# Patient Record
Sex: MALE | Race: WHITE | Employment: FULL TIME | URBAN - METROPOLITAN AREA
[De-identification: names, ages, dates, MRNs, and addresses within clinical notes are randomized per-mention and may not be internally consistent; named-entity substitution may affect disease eponyms.]

---

## 2017-04-21 RX ORDER — DICLOFENAC SODIUM 75 MG/1
75 TABLET, DELAYED RELEASE ORAL 2 TIMES DAILY
Qty: 60 TABLET | Refills: 3 | Status: SHIPPED | OUTPATIENT
Start: 2017-04-21

## 2017-04-26 ENCOUNTER — TELEPHONE (OUTPATIENT)
Dept: PRIMARY CARE CLINIC | Age: 52
End: 2017-04-26

## 2017-08-22 ENCOUNTER — OFFICE VISIT (OUTPATIENT)
Dept: PRIMARY CARE CLINIC | Age: 52
End: 2017-08-22

## 2017-08-22 VITALS
SYSTOLIC BLOOD PRESSURE: 120 MMHG | HEIGHT: 70 IN | DIASTOLIC BLOOD PRESSURE: 82 MMHG | WEIGHT: 315 LBS | HEART RATE: 64 BPM | TEMPERATURE: 97 F | BODY MASS INDEX: 45.1 KG/M2 | RESPIRATION RATE: 16 BRPM

## 2017-08-22 DIAGNOSIS — S81.801D LEG WOUND, RIGHT, SUBSEQUENT ENCOUNTER: ICD-10-CM

## 2017-08-22 DIAGNOSIS — M54.6 THORACOLUMBAR BACK PAIN: ICD-10-CM

## 2017-08-22 DIAGNOSIS — R60.0 LOCALIZED EDEMA: ICD-10-CM

## 2017-08-22 DIAGNOSIS — L03.115 CELLULITIS OF RIGHT LOWER EXTREMITY: Primary | ICD-10-CM

## 2017-08-22 DIAGNOSIS — M54.50 THORACOLUMBAR BACK PAIN: ICD-10-CM

## 2017-08-22 PROCEDURE — G8417 CALC BMI ABV UP PARAM F/U: HCPCS | Performed by: FAMILY MEDICINE

## 2017-08-22 PROCEDURE — 99213 OFFICE O/P EST LOW 20 MIN: CPT | Performed by: FAMILY MEDICINE

## 2017-08-22 PROCEDURE — G8427 DOCREV CUR MEDS BY ELIG CLIN: HCPCS | Performed by: FAMILY MEDICINE

## 2017-08-22 PROCEDURE — 1036F TOBACCO NON-USER: CPT | Performed by: FAMILY MEDICINE

## 2017-08-22 RX ORDER — CEPHALEXIN 500 MG/1
500 CAPSULE ORAL 3 TIMES DAILY
Qty: 30 CAPSULE | Refills: 0 | Status: SHIPPED | OUTPATIENT
Start: 2017-08-22 | End: 2017-09-01

## 2017-08-22 RX ORDER — HYDROCODONE BITARTRATE AND ACETAMINOPHEN 7.5; 325 MG/1; MG/1
1 TABLET ORAL EVERY 6 HOURS PRN
Qty: 60 TABLET | Refills: 0 | Status: SHIPPED | OUTPATIENT
Start: 2017-08-22 | End: 2018-05-24

## 2017-08-22 RX ORDER — FUROSEMIDE 20 MG/1
20 TABLET ORAL DAILY
Qty: 30 TABLET | Refills: 5 | Status: SHIPPED | OUTPATIENT
Start: 2017-08-22

## 2017-08-22 ASSESSMENT — ENCOUNTER SYMPTOMS
WHEEZING: 0
ABDOMINAL PAIN: 0
EYE REDNESS: 0
EYE PAIN: 0
FACIAL SWELLING: 0
APNEA: 0
CHEST TIGHTNESS: 0
DIARRHEA: 0
CONSTIPATION: 0
CHOKING: 0
COLOR CHANGE: 0
NAUSEA: 0
STRIDOR: 0
EYE DISCHARGE: 0
PHOTOPHOBIA: 0

## 2017-08-22 ASSESSMENT — PATIENT HEALTH QUESTIONNAIRE - PHQ9
SUM OF ALL RESPONSES TO PHQ QUESTIONS 1-9: 0
2. FEELING DOWN, DEPRESSED OR HOPELESS: 0
SUM OF ALL RESPONSES TO PHQ9 QUESTIONS 1 & 2: 0
1. LITTLE INTEREST OR PLEASURE IN DOING THINGS: 0

## 2017-10-30 RX ORDER — LIDOCAINE 50 MG/G
1 PATCH TOPICAL DAILY
Qty: 30 PATCH | Refills: 3 | Status: SHIPPED | OUTPATIENT
Start: 2017-10-30 | End: 2018-06-06 | Stop reason: SDUPTHER

## 2018-01-23 ENCOUNTER — OFFICE VISIT (OUTPATIENT)
Dept: PRIMARY CARE CLINIC | Age: 53
End: 2018-01-23
Payer: COMMERCIAL

## 2018-01-23 VITALS
HEIGHT: 69 IN | WEIGHT: 315 LBS | TEMPERATURE: 97.5 F | DIASTOLIC BLOOD PRESSURE: 90 MMHG | BODY MASS INDEX: 46.65 KG/M2 | HEART RATE: 72 BPM | SYSTOLIC BLOOD PRESSURE: 142 MMHG | RESPIRATION RATE: 14 BRPM

## 2018-01-23 DIAGNOSIS — R60.0 LOCALIZED EDEMA: ICD-10-CM

## 2018-01-23 DIAGNOSIS — L02.415 CELLULITIS AND ABSCESS OF RIGHT LEG: ICD-10-CM

## 2018-01-23 DIAGNOSIS — L03.115 CELLULITIS AND ABSCESS OF RIGHT LEG: ICD-10-CM

## 2018-01-23 DIAGNOSIS — S81.801S WOUND OF RIGHT LOWER EXTREMITY, SEQUELA: Primary | ICD-10-CM

## 2018-01-23 PROCEDURE — G8484 FLU IMMUNIZE NO ADMIN: HCPCS | Performed by: FAMILY MEDICINE

## 2018-01-23 PROCEDURE — G8427 DOCREV CUR MEDS BY ELIG CLIN: HCPCS | Performed by: FAMILY MEDICINE

## 2018-01-23 PROCEDURE — G8417 CALC BMI ABV UP PARAM F/U: HCPCS | Performed by: FAMILY MEDICINE

## 2018-01-23 PROCEDURE — 99213 OFFICE O/P EST LOW 20 MIN: CPT | Performed by: FAMILY MEDICINE

## 2018-01-23 PROCEDURE — 1036F TOBACCO NON-USER: CPT | Performed by: FAMILY MEDICINE

## 2018-01-23 PROCEDURE — 3017F COLORECTAL CA SCREEN DOC REV: CPT | Performed by: FAMILY MEDICINE

## 2018-01-23 RX ORDER — TRIAMTERENE AND HYDROCHLOROTHIAZIDE 37.5; 25 MG/1; MG/1
1 CAPSULE ORAL DAILY
Qty: 30 CAPSULE | Refills: 3 | Status: SHIPPED | OUTPATIENT
Start: 2018-01-23

## 2018-01-23 ASSESSMENT — ENCOUNTER SYMPTOMS
CONSTIPATION: 0
ABDOMINAL PAIN: 0
STRIDOR: 0
CHEST TIGHTNESS: 0
APNEA: 0
CHOKING: 0
DIARRHEA: 0
COLOR CHANGE: 0
EYE DISCHARGE: 0
FACIAL SWELLING: 0
PHOTOPHOBIA: 0
EYE REDNESS: 0
NAUSEA: 0
WHEEZING: 0
EYE PAIN: 0

## 2018-01-23 NOTE — PROGRESS NOTES
Subjective:      Patient ID: Martha Hiuzar is a 50 y.o. male who presents today for:  Chief Complaint   Patient presents with    Wound Infection     Pt. is here for a wound on his right lower leg. Pt. c/o of it seeping. Pt. denies any pain. Pt. is using 4X4 that are soaked after a 1/2 hour.  Health Maintenance     Pt. states he had a Colonoscopy done 4 years ago with Dr. Chelsea Hernandez. HPI   Wound Infection  Patient is here today for f/u on recurrent wound on right lower leg. Patient states that is is constantly seeping and states he soaks a 4x4 dressing after 1/2 hour. He denies any pain. He has been on various antibiotics in the past for treatment. He states the issues with cellulitis is aggravated by standing on his legs for periods of time. He is taking Lasix daily that he started again last night. It was mentioned by Dr. Zak Durand for patient to consider weight loss surgery and positives and negatives were discussed.      Past Medical History:   Diagnosis Date    Abnormal EKG 9/7/2016    Abrasion 8/21/2014    Atypical chest pain 2/18/2013    Class 3 obesity with serious comorbidity and body mass index (BMI) of 60.0 to 69.9 in adult [facility]) 1/23/2018    Cold sore 8/3/2015    Corneal abrasion     Edema 8/21/2014    Edema leg     lower leg    ETD (eustachian tube dysfunction) 4/17/2013    Hemorrhoids 1/9/2015    Hyperglycemia 9/7/2016    Knee pain, right     Lateral epicondylitis, R 2/18/2013    LBP (low back pain) 4/22/2014    Left otitis media 4/17/2013    Leg length discrepancy     Leg wound, right 8/21/2014    Osteoarthritis     Other and unspecified hyperlipidemia 8/15/2016    Pes planus     Pleuritis 2/18/2013    Right knee pain 8/15/2012    Rotator cuff tendinitis     Spondylarthrosis 5/7/2012    Thoracolumbar back pain 5/7/2012    Thoracolumbar back pain 5/7/2012    Tobacco abuse 2/18/2013    Tobacco abuse     Unspecified sleep apnea     Unstable knee 7/8/2015   

## 2018-01-23 NOTE — LETTER
[place] [address]  Phone: [phone]  Fax: [id number] 24, DO        January 23, 2018     Patient: José Peeling   YOB: 1965   Date of Visit: 1/23/2018       To Whom it May Concern:    Leena Bryan was seen in my clinic on 1/23/2018. He is excused from work 01/23/2018-.01/25/2018 and may return 01/26/2018 with no restrictions. If you have any questions or concerns, please don't hesitate to call.     Sincerely,     [facility], DO

## 2018-01-26 LAB
GRAM STAIN RESULT: NORMAL
WOUND/ABSCESS: NORMAL

## 2018-02-02 ENCOUNTER — OFFICE VISIT (OUTPATIENT)
Dept: PRIMARY CARE CLINIC | Age: 53
End: 2018-02-02
Payer: COMMERCIAL

## 2018-02-02 VITALS
BODY MASS INDEX: 46.65 KG/M2 | HEIGHT: 69 IN | TEMPERATURE: 97.4 F | HEART RATE: 80 BPM | RESPIRATION RATE: 14 BRPM | DIASTOLIC BLOOD PRESSURE: 90 MMHG | SYSTOLIC BLOOD PRESSURE: 146 MMHG | WEIGHT: 315 LBS

## 2018-02-02 DIAGNOSIS — E53.8 VITAMIN B 12 DEFICIENCY: ICD-10-CM

## 2018-02-02 DIAGNOSIS — G56.03 BILATERAL CARPAL TUNNEL SYNDROME: ICD-10-CM

## 2018-02-02 DIAGNOSIS — R20.2 TINGLING IN EXTREMITIES: ICD-10-CM

## 2018-02-02 DIAGNOSIS — L03.115 CELLULITIS OF RIGHT LOWER EXTREMITY: Primary | ICD-10-CM

## 2018-02-02 DIAGNOSIS — R03.0 ELEVATED BP WITHOUT DIAGNOSIS OF HYPERTENSION: ICD-10-CM

## 2018-02-02 PROCEDURE — 99213 OFFICE O/P EST LOW 20 MIN: CPT | Performed by: FAMILY MEDICINE

## 2018-02-02 PROCEDURE — G8427 DOCREV CUR MEDS BY ELIG CLIN: HCPCS | Performed by: FAMILY MEDICINE

## 2018-02-02 PROCEDURE — 96372 THER/PROPH/DIAG INJ SC/IM: CPT | Performed by: FAMILY MEDICINE

## 2018-02-02 PROCEDURE — 3017F COLORECTAL CA SCREEN DOC REV: CPT | Performed by: FAMILY MEDICINE

## 2018-02-02 PROCEDURE — 1036F TOBACCO NON-USER: CPT | Performed by: FAMILY MEDICINE

## 2018-02-02 PROCEDURE — G8484 FLU IMMUNIZE NO ADMIN: HCPCS | Performed by: FAMILY MEDICINE

## 2018-02-02 PROCEDURE — G8417 CALC BMI ABV UP PARAM F/U: HCPCS | Performed by: FAMILY MEDICINE

## 2018-02-02 RX ORDER — CYANOCOBALAMIN 1000 UG/ML
1000 INJECTION INTRAMUSCULAR; SUBCUTANEOUS ONCE
Status: COMPLETED | OUTPATIENT
Start: 2018-02-02 | End: 2018-02-02

## 2018-02-02 RX ADMIN — CYANOCOBALAMIN 1000 MCG: 1000 INJECTION INTRAMUSCULAR; SUBCUTANEOUS at 11:34

## 2018-02-02 ASSESSMENT — ENCOUNTER SYMPTOMS
COLOR CHANGE: 1
EYE DISCHARGE: 0
CHOKING: 0
PHOTOPHOBIA: 0
ABDOMINAL PAIN: 0
EYE REDNESS: 0
STRIDOR: 0
WHEEZING: 0
NAUSEA: 0
FACIAL SWELLING: 0
APNEA: 0
DIARRHEA: 0
EYE PAIN: 0
CONSTIPATION: 0
CHEST TIGHTNESS: 0

## 2018-02-02 NOTE — PROGRESS NOTES
Skin: Positive for color change (right leg). Negative for pallor, rash and wound. Allergic/Immunologic: Negative for immunocompromised state. Neurological: Positive for tingling. Negative for tremors, syncope, facial asymmetry, speech difficulty, numbness and headaches. Psychiatric/Behavioral: Negative for confusion and hallucinations. The patient is not nervous/anxious and is not hyperactive. Objective:   BP (!) 146/90 (Site: Right Arm, Position: Sitting, Cuff Size: Large Adult)   Pulse 80   Temp 97.4 °F (36.3 °C) (Oral)   Resp 14   Ht 5' 9\" (1.753 m)   Wt (!) 456 lb (206.8 kg)   BMI 67.34 kg/m²     Physical Exam   Constitutional: He is oriented to person, place, and time. He appears well-developed and well-nourished. HENT:   Head: Normocephalic and atraumatic. Mouth/Throat: Oropharynx is clear and moist. No oropharyngeal exudate. Eyes: Conjunctivae and EOM are normal. Pupils are equal, round, and reactive to light. Right eye exhibits no discharge. Left eye exhibits no discharge. No scleral icterus. Neck: Normal range of motion. Neck supple. No thyromegaly present. Cardiovascular: Normal rate, regular rhythm, normal heart sounds and intact distal pulses. Exam reveals no gallop and no friction rub. No murmur heard. Pulmonary/Chest: Effort normal and breath sounds normal. No respiratory distress. He has no wheezes. He has no rales. He exhibits no tenderness. Lymphadenopathy:     He has no cervical adenopathy. Neurological: He is alert and oriented to person, place, and time. Skin: Skin is warm and dry. There is erythema. Psychiatric: He has a normal mood and affect. His behavior is normal. Judgment and thought content normal.   Nursing note and vitals reviewed. Assessment:      Diagnosis Orders   1. Cellulitis of right lower extremity, improved     2. Elevated BP without diagnosis of hypertension     3. Bilateral carpal tunnel syndrome  EMG   4.  Tingling in

## 2018-04-30 DIAGNOSIS — S81.801S WOUND OF RIGHT LOWER EXTREMITY, SEQUELA: Primary | ICD-10-CM

## 2018-05-24 ENCOUNTER — HOSPITAL ENCOUNTER (OUTPATIENT)
Dept: WOUND CARE | Age: 53
Discharge: HOME | End: 2018-05-24
Payer: COMMERCIAL

## 2018-05-24 DIAGNOSIS — L97.812 NON-PRESSURE CHRONIC ULCER OF OTHER PART OF RIGHT LOWER LEG WITH FAT LAYER EXPOSED (HCC): ICD-10-CM

## 2018-05-24 DIAGNOSIS — I87.2 VENOUS INSUFFICIENCY (CHRONIC) (PERIPHERAL): ICD-10-CM

## 2018-05-24 PROCEDURE — 99213 OFFICE O/P EST LOW 20 MIN: CPT

## 2018-05-24 RX ORDER — GENTAMICIN SULFATE 1 MG/G
OINTMENT TOPICAL
Qty: 15 G | Refills: 3 | Status: SHIPPED | OUTPATIENT
Start: 2018-05-24 | End: 2019-03-29 | Stop reason: ALTCHOICE

## 2018-06-06 ENCOUNTER — OFFICE VISIT (OUTPATIENT)
Dept: PRIMARY CARE CLINIC | Age: 53
End: 2018-06-06
Payer: COMMERCIAL

## 2018-06-06 VITALS
RESPIRATION RATE: 16 BRPM | OXYGEN SATURATION: 96 % | WEIGHT: 315 LBS | BODY MASS INDEX: 46.65 KG/M2 | SYSTOLIC BLOOD PRESSURE: 138 MMHG | HEART RATE: 76 BPM | DIASTOLIC BLOOD PRESSURE: 76 MMHG | HEIGHT: 69 IN | TEMPERATURE: 98.1 F

## 2018-06-06 DIAGNOSIS — L97.812 NON-PRESSURE CHRONIC ULCER OF OTHER PART OF RIGHT LOWER LEG WITH FAT LAYER EXPOSED (HCC): ICD-10-CM

## 2018-06-06 DIAGNOSIS — M47.816 SPONDYLOSIS OF LUMBAR REGION WITHOUT MYELOPATHY OR RADICULOPATHY: ICD-10-CM

## 2018-06-06 DIAGNOSIS — M15.9 PRIMARY OSTEOARTHRITIS INVOLVING MULTIPLE JOINTS: ICD-10-CM

## 2018-06-06 DIAGNOSIS — I87.2 VENOUS INSUFFICIENCY (CHRONIC) (PERIPHERAL): ICD-10-CM

## 2018-06-06 DIAGNOSIS — S81.801D WOUND OF RIGHT LOWER EXTREMITY, SUBSEQUENT ENCOUNTER: Primary | ICD-10-CM

## 2018-06-06 DIAGNOSIS — M54.50 THORACOLUMBAR BACK PAIN: ICD-10-CM

## 2018-06-06 DIAGNOSIS — M54.6 THORACOLUMBAR BACK PAIN: ICD-10-CM

## 2018-06-06 PROCEDURE — G8417 CALC BMI ABV UP PARAM F/U: HCPCS | Performed by: FAMILY MEDICINE

## 2018-06-06 PROCEDURE — 3017F COLORECTAL CA SCREEN DOC REV: CPT | Performed by: FAMILY MEDICINE

## 2018-06-06 PROCEDURE — 99213 OFFICE O/P EST LOW 20 MIN: CPT | Performed by: FAMILY MEDICINE

## 2018-06-06 PROCEDURE — 1036F TOBACCO NON-USER: CPT | Performed by: FAMILY MEDICINE

## 2018-06-06 PROCEDURE — G8427 DOCREV CUR MEDS BY ELIG CLIN: HCPCS | Performed by: FAMILY MEDICINE

## 2018-06-06 RX ORDER — HYDROCODONE BITARTRATE AND ACETAMINOPHEN 7.5; 325 MG/1; MG/1
1 TABLET ORAL EVERY 6 HOURS PRN
Qty: 60 TABLET | Refills: 0 | Status: SHIPPED | OUTPATIENT
Start: 2018-06-06 | End: 2018-10-09 | Stop reason: SDUPTHER

## 2018-06-06 RX ORDER — CEPHALEXIN 500 MG/1
500 CAPSULE ORAL 3 TIMES DAILY
Qty: 30 CAPSULE | Refills: 0 | Status: SHIPPED | OUTPATIENT
Start: 2018-06-06 | End: 2018-06-16

## 2018-06-06 RX ORDER — LIDOCAINE 50 MG/G
1 PATCH TOPICAL DAILY
Qty: 30 PATCH | Refills: 3 | Status: SHIPPED | OUTPATIENT
Start: 2018-06-06

## 2018-06-06 ASSESSMENT — ENCOUNTER SYMPTOMS
BACK PAIN: 1
EYE REDNESS: 0
DIARRHEA: 0
PHOTOPHOBIA: 0
APNEA: 0
BOWEL INCONTINENCE: 0
COLOR CHANGE: 0
NAUSEA: 0
CHEST TIGHTNESS: 0
CHOKING: 0
STRIDOR: 0
FACIAL SWELLING: 0
CONSTIPATION: 0
WHEEZING: 0
EYE PAIN: 0
ABDOMINAL PAIN: 0
EYE DISCHARGE: 0

## 2018-06-07 ENCOUNTER — HOSPITAL ENCOUNTER (OUTPATIENT)
Dept: WOUND CARE | Age: 53
Discharge: HOME | End: 2018-06-07
Payer: COMMERCIAL

## 2018-06-07 VITALS
HEIGHT: 69 IN | SYSTOLIC BLOOD PRESSURE: 122 MMHG | RESPIRATION RATE: 18 BRPM | DIASTOLIC BLOOD PRESSURE: 65 MMHG | TEMPERATURE: 98.6 F | BODY MASS INDEX: 46.65 KG/M2 | WEIGHT: 315 LBS | HEART RATE: 70 BPM

## 2018-06-07 PROCEDURE — 11042 DBRDMT SUBQ TIS 1ST 20SQCM/<: CPT

## 2018-06-21 ENCOUNTER — HOSPITAL ENCOUNTER (OUTPATIENT)
Dept: WOUND CARE | Age: 53
Discharge: HOME | End: 2018-06-21
Payer: COMMERCIAL

## 2018-06-21 VITALS
SYSTOLIC BLOOD PRESSURE: 133 MMHG | DIASTOLIC BLOOD PRESSURE: 68 MMHG | HEART RATE: 76 BPM | TEMPERATURE: 97.8 F | RESPIRATION RATE: 20 BRPM

## 2018-06-21 PROCEDURE — 11042 DBRDMT SUBQ TIS 1ST 20SQCM/<: CPT

## 2018-08-02 ENCOUNTER — HOSPITAL ENCOUNTER (OUTPATIENT)
Dept: WOUND CARE | Age: 53
Discharge: HOME | End: 2018-08-02
Payer: COMMERCIAL

## 2018-08-02 VITALS
HEIGHT: 69 IN | HEART RATE: 72 BPM | DIASTOLIC BLOOD PRESSURE: 76 MMHG | TEMPERATURE: 97.6 F | WEIGHT: 315 LBS | SYSTOLIC BLOOD PRESSURE: 160 MMHG | BODY MASS INDEX: 46.65 KG/M2 | RESPIRATION RATE: 20 BRPM

## 2018-08-02 PROCEDURE — 99213 OFFICE O/P EST LOW 20 MIN: CPT

## 2018-08-02 NOTE — PROGRESS NOTES
[address]   Progress Note and Procedure Note      [address] RECORD NUMBER:  [id number]  AGE: 50 y.o. GENDER: male  : 1965  EPISODE DATE:  2018    Subjective:     Chief Complaint   Patient presents with    Wound Check     right leg         HISTORY of PRESENT ILLNESS HPI     Ivania Sepulveda is a 50 y.o. male who presents today for wound/ulcer evaluation. History of Wound Context: Chronic full-thickness venous ulceration right lower leg appears slightly improved with increased granulation. Patient states he did not get the Santyl prescription due to cost and has just been using OTC antibiotic ointment. Denies fever, chills, nausea or vomiting.   Wound/Ulcer Pain Timing/Severity: intermittent, mild  Quality of pain: burning  Severity:  1 / 10   Modifying Factors: Pain worsens with walking  Associated Signs/Symptoms: edema    Ulcer Identification:  Ulcer Type: venous  Contributing Factors: venous stasis, obesity and smoking    Wound: N/A        PAST MEDICAL HISTORY        Diagnosis Date    Abnormal EKG 2016    Abrasion 2014    Atypical chest pain 2013    Class 3 obesity with serious comorbidity and body mass index (BMI) of 60.0 to 69.9 in adult [facility]) 2018    Cold sore 8/3/2015    Corneal abrasion     Edema 2014    Edema leg     lower leg    ETD (eustachian tube dysfunction) 2013    Hemorrhoids 2015    Hyperglycemia 2016    Knee pain, right     Lateral epicondylitis, R 2013    LBP (low back pain) 2014    Left otitis media 2013    Leg length discrepancy     Leg wound, right 2014    Osteoarthritis     Other and unspecified hyperlipidemia 8/15/2016    Pes planus     Pleuritis 2013    Right knee pain 8/15/2012    Rotator cuff tendinitis     Spondylarthrosis 2012    Thoracolumbar back pain 2012    Thoracolumbar back pain 2012    Tingling in extremities, LUE 2018    H66.92    Low back pain M54.5    Leg wound, right S81.801A    Abrasion T14. 8XXA    Leg wound, right S81.801A    Edema R60.9    Weight loss R63.4    Hemorrhoids K64.9    Unstable knee M25.369    Cold sore B00.1    Anal fissure K60.2    Other and unspecified hyperlipidemia E78.5    Abnormal EKG R94.31    Hyperglycemia R73.9    Synovitis and tenosynovitis, unspecified M65.9    Class 3 obesity with serious comorbidity and body mass index (BMI) of 60.0 to 69.9 in adult (HCC) E66.9, Z68.44    Tingling in extremities, LUE R20.2    Non-pressure chronic ulcer of other part of right lower leg with fat layer exposed ([facility] [address] L97.812    Venous insufficiency (chronic) (peripheral) I87.2        Procedure Note  Indications:  Based on my examination of this patient's wound(s)/ulcer(s) today, debridement is not required to promote healing and evaluate the wound base. Plan:     Treatment Note please see attached Discharge Instructions  Will try Ghazala dressing changes, evaluate in 2 weeks, and consider Apligraf application. In my professional opinion this patient would benefit from HBO Therapy: No    Written patient dismissal instructions given to patient and signed by patient or POA. Discharge Instructions         Discharge Instructions        Home Care:  none     Wound Location:  Right Lower Lateral Leg     Dressing orders: 1. Cleanse wound(s) with normal saline. 2. Apply dry GHAZALA  Or equivalent to wound bed. 3. Moisten GHAZALA with a few drops of normal saline. 4. Cover with 4x4's and wrap with gauze (amy or kerlix)  5. Change  Every other day or Monday, Wednesday, and Friday    Compression:  Apply medium 10-20mmHg compression hose to right lower legs, may remove at bedtime, reapply first thing in the morning, avoid prolonged standing, elevate legs when sitting     Other Instructions:      Keep all dressings clean & dry.  Do not shower, take baths or get wound wet, unless otherwise instructed by

## 2018-08-16 ENCOUNTER — HOSPITAL ENCOUNTER (OUTPATIENT)
Dept: WOUND CARE | Age: 53
Discharge: HOME | End: 2018-08-16
Payer: COMMERCIAL

## 2018-08-16 VITALS
HEIGHT: 69 IN | SYSTOLIC BLOOD PRESSURE: 144 MMHG | DIASTOLIC BLOOD PRESSURE: 72 MMHG | HEART RATE: 86 BPM | BODY MASS INDEX: 46.65 KG/M2 | RESPIRATION RATE: 20 BRPM | TEMPERATURE: 97.7 F | WEIGHT: 315 LBS

## 2018-08-16 PROCEDURE — 11042 DBRDMT SUBQ TIS 1ST 20SQCM/<: CPT

## 2018-08-16 NOTE — PROGRESS NOTES
kg)   BMI 70.29 kg/m²     Wt Readings from Last 3 Encounters:   08/16/18 (!) 476 lb (215.9 kg)   08/02/18 (!) 476 lb (215.9 kg)   06/07/18 (!) 476 lb (215.9 kg)       PHYSICAL EXAM    Constitutional:   Well nourished and well developed. Appears neat and clean. Patient is alert, oriented x3, and in no apparent distress. Respiratory:  Respiratory effort is easy and symmetric bilaterally. Rate is normal at rest and on room air. Vascular:  Pedal Pulses is palpable and audible with doppler. Capillary refill is <3 sec to digits bilateral.  Extremities negative for pitting edema. Neurological:  Cranial nerves grossly intact. Deep tendon reflexes of the lower extremities are intact and symmetrical bilaterally. Sensation normal to touch and vibration. Sensation intact to 10 gram monofilament in extremities. Musculoskeletal:  Gait and station stable. Muscle strength +5/5 to all extrinsic muscles to the foot bilateral.  Full range of motion of ankle, subtalar, and midtarsal joints noted without crepitation. No cyanosis, clubbing or edema noted. Dermatological:  Wound description noted in wound assessment. Otherwise, skin is warm, dry, and well-hydrated with normal turgor, texture, and pigmentation. Psychiatric:  Judgement and insight intact. Short and long term memory intact. No evidence of depression, anxiety, or agitation. Patient is calm, cooperative, and communicative. Appropriate interactions and affect.         Assessment:      Patient Active Problem List   Diagnosis Code    Osteoarthritis M19.90    Sleep apnea G47.30    Finger infection L08.9    Spondylarthrosis M47.9    Thoracolumbar back pain M54.5, M54.6    Right knee pain M25.561    Lateral epicondylitis, R M77.10    Pleuritis R09.1    Atypical chest pain R07.89    Tobacco abuse Z72.0    ETD (eustachian tube dysfunction) H69.80    Left otitis media H66.92    Low back pain M54.5    Leg wound, right S81.801A    Abrasion Serosanguinous 8/16/2018  1:17 PM   Odor None 8/16/2018  1:17 PM   Margins Defined edges 8/16/2018  1:17 PM   Yue-wound Assessment Dry; Intact 8/16/2018  1:17 PM   Non-staged Wound Description Full thickness 8/16/2018  1:17 PM   [place]%Wound Bed 50 8/2/2018  1:00 PM   Red%Wound Bed 60 8/16/2018  1:17 PM   Yellow%Wound Bed 40 8/16/2018  1:17 PM   Op First Treatment Date 05/24/18 5/24/2018  1:04 PM   Number of days: 84       Percent of Wound/Ulcer Debrided: 100%    Total Surface Area Debrided:  4.8 sq cm     Diabetic/Pressure/Non Pressure Ulcers:  Ulcer: Non-Pressure ulcer, fat layer exposed      Bleeding:  Minimal    Hemostasis Achieved:  by pressure    Procedural Pain:  1  / 10     Post Procedural Pain:  0 / 10     Response to treatment:  Well tolerated by patient. Plan:     Treatment Note please see attached Discharge Instructions  Will continue with Ashlee and compression for now. Will consider Apligraf next visit. In my professional opinion this patient would benefit from HBO Therapy: No    Written patient dismissal instructions given to patient and signed by patient or POA. Discharge Instructions         Discharge Instructions        Home Care:  none     Wound Location:  Right Lower Lateral Leg     Dressing orders: 1. Cleanse wound(s) with normal saline. 2. Apply dry ASHLEE  Or equivalent to wound bed. 3. Moisten ASHLEE with a few drops of normal saline. 4. Cover with 4x4's and wrap with gauze (amy or kerlix)  5. Change  Every other day or Monday, Wednesday, and Friday     Compression:  Apply medium 10-20mmHg compression hose to right lower legs, may remove at bedtime, reapply first thing in the morning, avoid prolonged standing, elevate legs when sitting     Other Instructions:      Keep all dressings clean & dry.  Do not shower, take baths or get wound wet, unless otherwise instructed by your Wound Care doctor     Follow up visit   2 Weeks  August 30, 2018 at     For Diabetic patients keep blood sugars below 150 for optimal wound healing.     If you experience any of the following, please call the Wound Care Service during business hours: [phone]              *Increase in pain   *Temperature over 101   *Increase in drainage from your wound or a foul odor   *Uncontrolled swelling    *Need for compression bandage changes due to slippage, breakthrough drainage     If you need medical attention outside of business hours, please contact your Primary Care Doctor or go to the nearest emergency room. Keep next scheduled appointment. Christy Hanson give 24 hour notice if unable to keep appointment. [phone]     PLEASE NOTE: IF YOU ARE UNABLE TO OBTAIN WOUND SUPPLIES, CONTINUE TO USE THE SUPPLIES YOU HAVE AVAILABLE UNTIL YOU ARE ABLE TO REACH US.  IT IS MOST IMPORTANT TO KEEP THE WOUND COVERED AT ALL TIMES  Electronically signed by Zoya Jade DPM on 8/16/2018 at 1:42 PM          Electronically signed by Zoya Jade DPM on 8/16/2018 at 1:43 PM

## 2018-08-27 ENCOUNTER — OFFICE VISIT (OUTPATIENT)
Dept: PRIMARY CARE CLINIC | Age: 53
End: 2018-08-27
Payer: COMMERCIAL

## 2018-08-27 VITALS
OXYGEN SATURATION: 97 % | DIASTOLIC BLOOD PRESSURE: 70 MMHG | SYSTOLIC BLOOD PRESSURE: 126 MMHG | BODY MASS INDEX: 46.65 KG/M2 | TEMPERATURE: 97.8 F | WEIGHT: 315 LBS | HEART RATE: 81 BPM | RESPIRATION RATE: 12 BRPM | HEIGHT: 69 IN

## 2018-08-27 DIAGNOSIS — J02.9 PHARYNGITIS, UNSPECIFIED ETIOLOGY: Primary | ICD-10-CM

## 2018-08-27 DIAGNOSIS — R68.89 SENSATION OF SWOLLEN THROAT: ICD-10-CM

## 2018-08-27 PROCEDURE — 1036F TOBACCO NON-USER: CPT | Performed by: FAMILY MEDICINE

## 2018-08-27 PROCEDURE — 99213 OFFICE O/P EST LOW 20 MIN: CPT | Performed by: FAMILY MEDICINE

## 2018-08-27 PROCEDURE — G8417 CALC BMI ABV UP PARAM F/U: HCPCS | Performed by: FAMILY MEDICINE

## 2018-08-27 PROCEDURE — 3017F COLORECTAL CA SCREEN DOC REV: CPT | Performed by: FAMILY MEDICINE

## 2018-08-27 PROCEDURE — G8427 DOCREV CUR MEDS BY ELIG CLIN: HCPCS | Performed by: FAMILY MEDICINE

## 2018-08-27 RX ORDER — CEFUROXIME AXETIL 500 MG/1
500 TABLET ORAL 2 TIMES DAILY
Qty: 20 TABLET | Refills: 0 | Status: SHIPPED | OUTPATIENT
Start: 2018-08-27 | End: 2018-09-06

## 2018-08-27 RX ORDER — BENZONATATE 100 MG/1
200 CAPSULE ORAL 3 TIMES DAILY PRN
Qty: 60 CAPSULE | Refills: 1 | COMMUNITY
Start: 2018-08-27 | End: 2018-09-04 | Stop reason: SDUPTHER

## 2018-08-27 ASSESSMENT — ENCOUNTER SYMPTOMS
COLOR CHANGE: 0
CHANGE IN BOWEL HABIT: 0
SORE THROAT: 1
CHEST TIGHTNESS: 0
SWOLLEN GLANDS: 0
COUGH: 1
ABDOMINAL PAIN: 0
EYE PAIN: 0
WHEEZING: 0
EYE REDNESS: 0
APNEA: 0
VOMITING: 0
CONSTIPATION: 0
EYE DISCHARGE: 0
VISUAL CHANGE: 0
DIARRHEA: 0
NAUSEA: 0
FACIAL SWELLING: 0
PHOTOPHOBIA: 0
CHOKING: 0
STRIDOR: 0
RHINORRHEA: 1

## 2018-08-27 ASSESSMENT — PATIENT HEALTH QUESTIONNAIRE - PHQ9
1. LITTLE INTEREST OR PLEASURE IN DOING THINGS: 1
SUM OF ALL RESPONSES TO PHQ9 QUESTIONS 1 & 2: 2
2. FEELING DOWN, DEPRESSED OR HOPELESS: 1
SUM OF ALL RESPONSES TO PHQ QUESTIONS 1-9: 2
SUM OF ALL RESPONSES TO PHQ QUESTIONS 1-9: 2

## 2018-08-27 NOTE — PROGRESS NOTES
Abrasion 8/21/2014    Atypical chest pain 2/18/2013    Class 3 obesity with serious comorbidity and body mass index (BMI) of 60.0 to 69.9 in adult [facility]) 1/23/2018    Cold sore 8/3/2015    Corneal abrasion     Edema 8/21/2014    Edema leg     lower leg    ETD (eustachian tube dysfunction) 4/17/2013    Hemorrhoids 1/9/2015    Hyperglycemia 9/7/2016    Knee pain, right     Lateral epicondylitis, R 2/18/2013    LBP (low back pain) 4/22/2014    Left otitis media 4/17/2013    Leg length discrepancy     Leg wound, right 8/21/2014    Osteoarthritis     Other and unspecified hyperlipidemia 8/15/2016    Pes planus     Pleuritis 2/18/2013    Right knee pain 8/15/2012    Rotator cuff tendinitis     Spondylarthrosis 5/7/2012    Thoracolumbar back pain 5/7/2012    Thoracolumbar back pain 5/7/2012    Tingling in extremities, LUE 2/2/2018    Tobacco abuse 2/18/2013    Tobacco abuse     Unspecified sleep apnea     Unstable knee 7/8/2015    Varicosities     Weight loss 12/11/2014     Past Surgical History:   Procedure Laterality Date    COLONOSCOPY  9/25/14    POLYPECTOMY JARMOSZUK    FASCIOTOMY Right 2006    triple fasciotomy right leg    TONSILLECTOMY       Family History   Problem Relation Age of Onset    Diabetes Mother     Kidney Disease Mother     Cancer Mother         Lung    Cancer Father         Pancreatic    High Blood Pressure Father     Diabetes Other     Cancer Other      Social History     Social History    Marital status:      Spouse name: N/A    Number of children: N/A    Years of education: N/A     Occupational History    Not on file.      Social History Main Topics    Smoking status: Former Smoker     Quit date: 2/1/2014    Smokeless tobacco: Never Used    Alcohol use Yes    Drug use: Unknown    Sexual activity: Not on file     Other Topics Concern    Not on file     Social History Narrative    No narrative on file     Allergies:  Patient has no known allergies. Review of Systems   Constitutional: Positive for chills. Negative for activity change, appetite change, diaphoresis, fatigue and fever. HENT: Positive for congestion (nasal), rhinorrhea and sore throat. Negative for ear discharge, ear pain, facial swelling, hearing loss and mouth sores. Eyes: Negative for photophobia, pain, discharge and redness. Respiratory: Positive for cough (productive of yellow sputum). Negative for apnea, choking, chest tightness, wheezing and stridor. Cardiovascular: Negative for chest pain, palpitations and leg swelling. Gastrointestinal: Negative for abdominal pain, anorexia, change in bowel habit, constipation, diarrhea, nausea and vomiting. Endocrine: Negative for cold intolerance, heat intolerance, polydipsia and polyphagia. Genitourinary: Negative for dysuria and frequency. Musculoskeletal: Negative for arthralgias, gait problem, joint swelling, myalgias, neck pain and neck stiffness. Skin: Negative for color change, pallor, rash and wound. Allergic/Immunologic: Negative for immunocompromised state. Neurological: Negative for vertigo, tremors, syncope, facial asymmetry, speech difficulty, weakness, numbness and headaches. Psychiatric/Behavioral: Negative for confusion and hallucinations. The patient is not nervous/anxious and is not hyperactive. Objective:   /70 (Site: Left Arm, Position: Sitting, Cuff Size: Large Adult)   Pulse 81   Temp 97.8 °F (36.6 °C) (Oral)   Resp 12   Ht 5' 9\" (1.753 m)   Wt (!) 481 lb (218.2 kg)   SpO2 97%   BMI 71.03 kg/m²     Physical Exam   Constitutional: He is oriented to person, place, and time. He appears well-developed and well-nourished. No distress. HENT:   Head: Normocephalic and atraumatic. Right Ear: External ear normal.   Left Ear: External ear normal.   Nose: Nose normal.   Mouth/Throat: Posterior oropharyngeal erythema present. Eyes: Pupils are equal, round, and reactive to light.

## 2018-09-05 RX ORDER — BENZONATATE 100 MG/1
200 CAPSULE ORAL 3 TIMES DAILY PRN
Qty: 60 CAPSULE | Refills: 1 | Status: SHIPPED | OUTPATIENT
Start: 2018-09-05 | End: 2018-12-27 | Stop reason: ALTCHOICE

## 2018-09-10 ENCOUNTER — OFFICE VISIT (OUTPATIENT)
Dept: PRIMARY CARE CLINIC | Age: 53
End: 2018-09-10
Payer: COMMERCIAL

## 2018-09-10 VITALS
BODY MASS INDEX: 46.65 KG/M2 | HEART RATE: 83 BPM | RESPIRATION RATE: 20 BRPM | DIASTOLIC BLOOD PRESSURE: 80 MMHG | WEIGHT: 315 LBS | HEIGHT: 69 IN | OXYGEN SATURATION: 93 % | SYSTOLIC BLOOD PRESSURE: 138 MMHG | TEMPERATURE: 97.8 F

## 2018-09-10 DIAGNOSIS — J45.21 MILD INTERMITTENT REACTIVE AIRWAY DISEASE WITH ACUTE EXACERBATION: ICD-10-CM

## 2018-09-10 DIAGNOSIS — R05.9 COUGH: ICD-10-CM

## 2018-09-10 DIAGNOSIS — J40 BRONCHITIS: Primary | ICD-10-CM

## 2018-09-10 PROCEDURE — G8417 CALC BMI ABV UP PARAM F/U: HCPCS | Performed by: FAMILY MEDICINE

## 2018-09-10 PROCEDURE — G8427 DOCREV CUR MEDS BY ELIG CLIN: HCPCS | Performed by: FAMILY MEDICINE

## 2018-09-10 PROCEDURE — 3017F COLORECTAL CA SCREEN DOC REV: CPT | Performed by: FAMILY MEDICINE

## 2018-09-10 PROCEDURE — 99213 OFFICE O/P EST LOW 20 MIN: CPT | Performed by: FAMILY MEDICINE

## 2018-09-10 PROCEDURE — 1036F TOBACCO NON-USER: CPT | Performed by: FAMILY MEDICINE

## 2018-09-10 RX ORDER — PSEUDOEPHEDRINE HCL, GUAIFENESIN 375; 60 MG/1; MG/1
1 TABLET ORAL
Qty: 30 TABLET | Refills: 1 | Status: SHIPPED | OUTPATIENT
Start: 2018-09-10 | End: 2018-09-17

## 2018-09-10 RX ORDER — ALBUTEROL SULFATE 90 UG/1
2 AEROSOL, METERED RESPIRATORY (INHALATION) EVERY 6 HOURS PRN
Qty: 1 INHALER | Refills: 3 | Status: SHIPPED | OUTPATIENT
Start: 2018-09-10

## 2018-09-10 RX ORDER — AZITHROMYCIN 250 MG/1
TABLET, FILM COATED ORAL
Qty: 1 PACKET | Refills: 0 | Status: SHIPPED | OUTPATIENT
Start: 2018-09-10 | End: 2018-09-17

## 2018-09-10 ASSESSMENT — ENCOUNTER SYMPTOMS
HOARSE VOICE: 0
RHINORRHEA: 1
STRIDOR: 0
ABDOMINAL PAIN: 0
DIARRHEA: 0
PHOTOPHOBIA: 0
CHOKING: 0
EYE DISCHARGE: 0
SINUS PRESSURE: 0
NAUSEA: 0
EYE REDNESS: 0
COLOR CHANGE: 0
SWOLLEN GLANDS: 0
EYE PAIN: 0
SORE THROAT: 0
SHORTNESS OF BREATH: 1
CONSTIPATION: 0
APNEA: 0
CHEST TIGHTNESS: 0
WHEEZING: 1
FACIAL SWELLING: 0
COUGH: 1

## 2018-09-10 NOTE — PROGRESS NOTES
Subjective:      Patient ID: Nayla Eid is a 48 y.o. male who presents today for:  Chief Complaint   Patient presents with    Nasal Congestion     Patient is here for nasal congestion, dry hacking cough, SOB, wheezing, and sweats/warm. He has used wife's rescue inhaler (albuterol) did help breathing. x 2 days. he denies fever,or ear pain. 2-3 hours of sleep occurring from breathing and coughing       Sinusitis   This is a new problem. The current episode started in the past 7 days. The problem is unchanged. There has been no fever. He is experiencing no pain. Associated symptoms include congestion (chest, occasional small amounts of brownish sputum), coughing, diaphoresis and shortness of breath. Pertinent negatives include no chills, ear pain, headaches, hoarse voice, neck pain, sinus pressure, sneezing, sore throat or swollen glands. Treatments tried: Albuterol inhaler, Mucinex, warm tea, leftover antibiotic. The treatment provided no relief.        Past Medical History:   Diagnosis Date    Abnormal EKG 9/7/2016    Abrasion 8/21/2014    Atypical chest pain 2/18/2013    Class 3 obesity with serious comorbidity and body mass index (BMI) of 60.0 to 69.9 in adult [facility]) 1/23/2018    Cold sore 8/3/2015    Corneal abrasion     Edema 8/21/2014    Edema leg     lower leg    ETD (eustachian tube dysfunction) 4/17/2013    Hemorrhoids 1/9/2015    Hyperglycemia 9/7/2016    Knee pain, right     Lateral epicondylitis, R 2/18/2013    LBP (low back pain) 4/22/2014    Left otitis media 4/17/2013    Leg length discrepancy     Leg wound, right 8/21/2014    Osteoarthritis     Other and unspecified hyperlipidemia 8/15/2016    Pes planus     Pleuritis 2/18/2013    Right knee pain 8/15/2012    Rotator cuff tendinitis     Spondylarthrosis 5/7/2012    Thoracolumbar back pain 5/7/2012    Thoracolumbar back pain 5/7/2012    Tingling in extremities, LUE 2/2/2018    Tobacco abuse 2/18/2013    Tobacco abuse  Unspecified sleep apnea     Unstable knee 7/8/2015    Varicosities     Weight loss 12/11/2014     Past Surgical History:   Procedure Laterality Date    COLONOSCOPY  9/25/14    POLYPECTOMY Elyce Reading    FASCIOTOMY Right 2006    triple fasciotomy right leg    TONSILLECTOMY       Family History   Problem Relation Age of Onset    Diabetes Mother     Kidney Disease Mother     Cancer Mother         Lung    Cancer Father         Pancreatic    High Blood Pressure Father     Diabetes Other     Cancer Other      Social History     Social History    Marital status:      Spouse name: N/A    Number of children: N/A    Years of education: N/A     Occupational History    Not on file. Social History Main Topics    Smoking status: Former Smoker     Quit date: 2/1/2014    Smokeless tobacco: Never Used    Alcohol use Yes    Drug use: Unknown    Sexual activity: Not on file     Other Topics Concern    Not on file     Social History Narrative    No narrative on file     Allergies:  Patient has no known allergies. Review of Systems   Constitutional: Positive for diaphoresis. Negative for activity change, appetite change, chills and fever. HENT: Positive for congestion (chest, occasional small amounts of brownish sputum), postnasal drip and rhinorrhea. Negative for ear discharge, ear pain, facial swelling, hearing loss, hoarse voice, mouth sores, sinus pressure, sneezing and sore throat. Eyes: Negative for photophobia, pain, discharge and redness. Respiratory: Positive for cough, shortness of breath and wheezing. Negative for apnea, choking, chest tightness and stridor. Cardiovascular: Negative for chest pain, palpitations and leg swelling. Gastrointestinal: Negative for abdominal pain, constipation, diarrhea and nausea. Endocrine: Negative for cold intolerance, heat intolerance, polydipsia and polyphagia. Genitourinary: Negative for dysuria and frequency.    Musculoskeletal: Negative for gait problem, joint swelling, neck pain and neck stiffness. Skin: Negative for color change, pallor, rash and wound. Allergic/Immunologic: Negative for immunocompromised state. Neurological: Negative for tremors, syncope, facial asymmetry, speech difficulty and headaches. Psychiatric/Behavioral: Positive for sleep disturbance (due to coughing). Negative for confusion and hallucinations. The patient is not nervous/anxious and is not hyperactive. Objective:   /80 (Site: Right Upper Arm, Position: Sitting, Cuff Size: Large Adult)   Pulse 83   Temp 97.8 °F (36.6 °C) (Tympanic)   Resp 20   Ht 5' 9\" (1.753 m)   Wt (!) 483 lb 8 oz (219.3 kg)   SpO2 93% Comment: hand are chilly  BMI 71.40 kg/m²     Physical Exam   Constitutional: He is oriented to person, place, and time. He appears well-developed and well-nourished. No distress. HENT:   Head: Normocephalic and atraumatic. Right Ear: External ear normal.   Left Ear: External ear normal.   Nose: Nose normal.   Mouth/Throat: Oropharynx is clear and moist.   Eyes: Pupils are equal, round, and reactive to light. Conjunctivae and EOM are normal. Right eye exhibits no discharge. No scleral icterus. Neck: Normal range of motion. Neck supple. No tracheal deviation present. No thyromegaly present. Cardiovascular: Normal rate, regular rhythm, normal heart sounds and intact distal pulses. Exam reveals no gallop and no friction rub. No murmur heard. Pulmonary/Chest: Effort normal. No respiratory distress. He has no wheezes. He has rhonchi (left side). He has no rales. He exhibits no tenderness. Lymphadenopathy:     He has no cervical adenopathy. Neurological: He is alert and oriented to person, place, and time. Coordination normal.   Skin: Skin is warm and dry. He is not diaphoretic. Psychiatric: He has a normal mood and affect.  His behavior is normal. Judgment and thought content normal.   Nursing note and vitals reviewed. Assessment:      Diagnosis Orders   1. Bronchitis  azithromycin (ZITHROMAX) 250 MG tablet    pseudoephedrine-guaiFENesin (ENTEX T)  MG TABS   2. Cough  pseudoephedrine-guaiFENesin (ENTEX T)  MG TABS   3. Mild intermittent reactive airway disease with acute exacerbation  albuterol sulfate HFA (PROAIR HFA) 108 (90 Base) MCG/ACT inhaler       Plan:      No orders of the defined types were placed in this encounter. Orders Placed This Encounter   Medications    albuterol sulfate HFA (PROAIR HFA) 108 (90 Base) MCG/ACT inhaler     Sig: Inhale 2 puffs into the lungs every 6 hours as needed for Wheezing     Dispense:  1 Inhaler     Refill:  3    azithromycin (ZITHROMAX) 250 MG tablet     Sig: Take 2 tabs (500 mg) on Day 1, and take 1 tab (250 mg) on days 2 through 5. Dispense:  1 packet     Refill:  0    pseudoephedrine-guaiFENesin (ENTEX T)  MG TABS     Sig: Take 1 tablet by mouth daily (with breakfast)     Dispense:  30 tablet     Refill:  1       Controlled Substances Monitoring:      No Follow-up on file. I, Sindhu Geronimo CMA   , am scribing for and in the presence of [place], DO. Electronically signed by :  Sindhu Ross DO, personally performed the services described in this documentation, as scribed by aNndo Donaldson CMA   in my presence, and it is both accurate and complete.  Electronically signed by: [place] Life, DO    9/10/18 2:25 PM    Ericka Narvaez DO

## 2018-09-11 ENCOUNTER — TELEPHONE (OUTPATIENT)
Dept: PRIMARY CARE CLINIC | Age: 53
End: 2018-09-11

## 2018-09-12 DIAGNOSIS — R05.9 COUGH: Primary | ICD-10-CM

## 2018-09-12 RX ORDER — PROMETHAZINE HYDROCHLORIDE AND CODEINE PHOSPHATE 6.25; 1 MG/5ML; MG/5ML
5 SYRUP ORAL 4 TIMES DAILY PRN
Qty: 118 ML | Refills: 0 | Status: SHIPPED | OUTPATIENT
Start: 2018-09-12 | End: 2018-09-19

## 2018-09-13 ENCOUNTER — HOSPITAL ENCOUNTER (OUTPATIENT)
Dept: WOUND CARE | Age: 53
Discharge: HOME | End: 2018-09-13
Payer: COMMERCIAL

## 2018-09-13 VITALS
HEART RATE: 77 BPM | SYSTOLIC BLOOD PRESSURE: 140 MMHG | RESPIRATION RATE: 20 BRPM | DIASTOLIC BLOOD PRESSURE: 67 MMHG | TEMPERATURE: 98.6 F

## 2018-09-13 PROCEDURE — 87186 SC STD MICRODIL/AGAR DIL: CPT

## 2018-09-13 PROCEDURE — 11042 DBRDMT SUBQ TIS 1ST 20SQCM/<: CPT

## 2018-09-13 PROCEDURE — 87077 CULTURE AEROBIC IDENTIFY: CPT

## 2018-09-13 PROCEDURE — 87205 SMEAR GRAM STAIN: CPT

## 2018-09-13 PROCEDURE — 87070 CULTURE OTHR SPECIMN AEROBIC: CPT

## 2018-09-13 NOTE — CODE DOCUMENTATION
[id number] Physician Billing Sheet. Lyndsey Luna  AGE: 48 y.o.    GENDER: male  : 1965  TODAY'S DATE:  2018    ICD-10  [facility] [address] Problems    Diagnosis Date Noted    Non-pressure chronic ulcer of other part of right lower leg with fat layer exposed ([facility] [address] [L97.812] 2018    Venous insufficiency (chronic) (peripheral) [I87.2] 2018    Class 3 obesity with serious comorbidity and body mass index (BMI) of 60.0 to 69.9 in adult [facility]) [E66.9, Z68.44] 2018    Tobacco abuse [Z72.0] 2013       PHYSICIAN PROCEDURES    CPT CODE  90907-G      Electronically signed by Linda Brink DPM on 2018 at 2:15 PM

## 2018-09-13 NOTE — PROGRESS NOTES
[facility]                                                   Progress Note and Procedure Note      [address] RECORD NUMBER:  [id number]  AGE: 48 y.o. GENDER: male  : 1965  EPISODE DATE:  2018    Subjective:     Chief Complaint   Patient presents with    Wound Check     right lower lateral leg wound         HISTORY of PRESENT ILLNESS HPI     Meir Romero is a 48 y.o. male who presents today for wound/ulcer evaluation. History of Wound Context: Chronic non-pressure full-thickness venous ulceration is slightly improved. He is using Ghazala and compression daily. Denies fever, chills, nausea or vomiting.   Wound/Ulcer Pain Timing/Severity: intermittent, mild  Quality of pain: sharp  Severity:  1 / 10   Modifying Factors: Pain worsens with walking  Associated Signs/Symptoms: edema    Ulcer Identification:  Ulcer Type: venous  Contributing Factors: venous stasis, obesity and smoking    Wound: N/A        PAST MEDICAL HISTORY        Diagnosis Date    Abnormal EKG 2016    Abrasion 2014    Atypical chest pain 2013    Class 3 obesity with serious comorbidity and body mass index (BMI) of 60.0 to 69.9 in adult [facility]) 2018    Cold sore 8/3/2015    Corneal abrasion     Edema 2014    Edema leg     lower leg    ETD (eustachian tube dysfunction) 2013    Hemorrhoids 2015    Hyperglycemia 2016    Knee pain, right     Lateral epicondylitis, R 2013    LBP (low back pain) 2014    Left otitis media 2013    Leg length discrepancy     Leg wound, right 2014    Osteoarthritis     Other and unspecified hyperlipidemia 8/15/2016    Pes planus     Pleuritis 2013    Right knee pain 8/15/2012    Rotator cuff tendinitis     Spondylarthrosis 2012    Thoracolumbar back pain 2012    Thoracolumbar back pain 2012    Tingling in extremities, LUE 2018    Tobacco abuse 2013    Tobacco abuse     Unspecified sleep apnea     Unstable knee 7/8/2015    Varicosities     Weight loss 12/11/2014       PAST SURGICAL HISTORY    Past Surgical History:   Procedure Laterality Date    COLONOSCOPY  9/25/14    POLYPECTOMY Samantha Fuentesjeanne    FASCIOTOMY Right 2006    triple fasciotomy right leg    TONSILLECTOMY         FAMILY HISTORY    Family History   Problem Relation Age of Onset    Diabetes Mother     Kidney Disease Mother     Cancer Mother         Lung    Cancer Father         Pancreatic    High Blood Pressure Father     Diabetes Other     Cancer Other        SOCIAL HISTORY    Social History   Substance Use Topics    Smoking status: Former Smoker     Quit date: 2/1/2014    Smokeless tobacco: Never Used    Alcohol use Yes       ALLERGIES    No Known Allergies    MEDICATIONS    Current Outpatient Prescriptions on File Prior to Encounter   Medication Sig Dispense Refill    promethazine-codeine (PHENERGAN WITH CODEINE) 6.25-10 MG/5ML syrup Take 5 mLs by mouth 4 times daily as needed for Cough for up to 7 days. Lotus Montes 118 mL 0    albuterol sulfate HFA (PROAIR HFA) 108 (90 Base) MCG/ACT inhaler Inhale 2 puffs into the lungs every 6 hours as needed for Wheezing 1 Inhaler 3    azithromycin (ZITHROMAX) 250 MG tablet Take 2 tabs (500 mg) on Day 1, and take 1 tab (250 mg) on days 2 through 5. 1 packet 0    pseudoephedrine-guaiFENesin (ENTEX T)  MG TABS Take 1 tablet by mouth daily (with breakfast) 30 tablet 1    benzonatate (TESSALON PERLES) 100 MG capsule Take 2 capsules by mouth 3 times daily as needed for Cough 60 capsule 1    lidocaine (LIDODERM) 5 % Place 1 patch onto the skin daily 12 hours on, 12 hours off. 30 patch 3    gentamicin (GARAMYCIN) 0.1 % ointment Apply topically 3 times daily.  15 g 3    triamterene-hydrochlorothiazide (DYAZIDE) 37.5-25 MG per capsule Take 1 capsule by mouth daily 30 capsule 3    furosemide (LASIX) 20 MG tablet Take 1 tablet by mouth daily 30 tablet 5    fluticasone (FLONASE) Finger infection L08.9    Spondylarthrosis M47.9    Thoracolumbar back pain M54.5, M54.6    Right knee pain M25.561    Lateral epicondylitis, R M77.10    Pleuritis R09.1    Atypical chest pain R07.89    Tobacco abuse Z72.0    ETD (eustachian tube dysfunction) H69.80    Left otitis media H66.92    Low back pain M54.5    Leg wound, right S81.801A    Abrasion T14. 8XXA    Leg wound, right S81.801A    Edema R60.9    Weight loss R63.4    Hemorrhoids K64.9    Unstable knee M25.369    Cold sore B00.1    Anal fissure K60.2    Other and unspecified hyperlipidemia E78.5    Abnormal EKG R94.31    Hyperglycemia R73.9    Synovitis and tenosynovitis, unspecified M65.9    Class 3 obesity with serious comorbidity and body mass index (BMI) of 60.0 to 69.9 in adult ([facility]) E66.9, Z68.44    Tingling in extremities, LUE R20.2    Non-pressure chronic ulcer of other part of right lower leg with fat layer exposed ([facility] [address] L97.812    Venous insufficiency (chronic) (peripheral) I87.2        Procedure Note  Indications:  Based on my examination of this patient's wound(s)/ulcer(s) today, debridement is required to promote healing and evaluate the wound base. Performed by: Lynda Fine DPM    Consent obtained:  Yes    Time out taken:  Yes    Pain Control: Anesthetic  Anesthetic: None       Debridement:Excisional Debridement    Using dermal currette the wound(s)/ulcer(s) was/were sharply debrided down through and including the removal of subcutaneous tissue.         Devitalized Tissue Debrided:  fibrin, biofilm and slough    Pre Debridement Measurements:  Are located in the [place]  Documentation Flow Sheet    Wound/Ulcer #: 1    Post Debridement Measurements:  Wound/Ulcer Descriptions are Pre Debridement except measurements:  Wound 05/24/18 #1 right lower lateral leg (Active)   Wound Image   9/13/2018  1:51 PM   Wound Type Wound 9/13/2018  1:51 PM   Wound Venous 9/13/2018  1:51 PM   Wound Cleansed Rinsed/Irrigated Friday     Compression:  Apply medium 10-20mmHg compression hose to right lower legs, may remove at bedtime, reapply first thing in the morning, avoid prolonged standing, elevate legs when sitting     Other Instructions: Culture in [facility] today     Keep all dressings clean & dry. Do not shower, take baths or get wound wet, unless otherwise instructed by your Wound Care doctor     Follow up visit   2 Weeks  September 27, 2018 at     For Diabetic patients keep blood sugars below 150 for optimal wound healing.     If you experience any of the following, please call the Wound Care Service during business hours: [phone]              *Increase in pain   *Temperature over 101   *Increase in drainage from your wound or a foul odor   *Uncontrolled swelling    *Need for compression bandage changes due to slippage, breakthrough drainage     If you need medical attention outside of business hours, please contact your Primary Care Doctor or go to the nearest emergency room. Keep next scheduled appointment. Jasper Cristina give 24 hour notice if unable to keep appointment. [phone]     PLEASE NOTE: IF YOU ARE UNABLE TO OBTAIN WOUND SUPPLIES, CONTINUE TO USE THE SUPPLIES YOU HAVE AVAILABLE UNTIL YOU ARE ABLE TO REACH US.  IT IS MOST IMPORTANT TO KEEP THE WOUND COVERED AT ALL TIMES    Electronically signed by Steffan Phoenix, DPM on 9/13/2018 at 2:10 PM            Electronically signed by Steffan Phoenix, DPM on 9/13/2018 at 2:11 PM

## 2018-09-16 LAB
GRAM STAIN RESULT: ABNORMAL
ORGANISM: ABNORMAL
WOUND/ABSCESS: ABNORMAL
WOUND/ABSCESS: ABNORMAL

## 2018-09-17 ENCOUNTER — OFFICE VISIT (OUTPATIENT)
Dept: PRIMARY CARE CLINIC | Age: 53
End: 2018-09-17
Payer: COMMERCIAL

## 2018-09-17 VITALS
OXYGEN SATURATION: 94 % | HEART RATE: 70 BPM | BODY MASS INDEX: 46.65 KG/M2 | TEMPERATURE: 97.2 F | DIASTOLIC BLOOD PRESSURE: 80 MMHG | HEIGHT: 69 IN | SYSTOLIC BLOOD PRESSURE: 118 MMHG | WEIGHT: 315 LBS | RESPIRATION RATE: 20 BRPM

## 2018-09-17 DIAGNOSIS — R06.02 SOB (SHORTNESS OF BREATH): ICD-10-CM

## 2018-09-17 DIAGNOSIS — J40 BRONCHITIS: Primary | ICD-10-CM

## 2018-09-17 DIAGNOSIS — J40 BRONCHITIS: ICD-10-CM

## 2018-09-17 LAB — D DIMER: 0.39 MG/L FEU (ref 0–0.5)

## 2018-09-17 PROCEDURE — 1036F TOBACCO NON-USER: CPT | Performed by: FAMILY MEDICINE

## 2018-09-17 PROCEDURE — G8427 DOCREV CUR MEDS BY ELIG CLIN: HCPCS | Performed by: FAMILY MEDICINE

## 2018-09-17 PROCEDURE — 99213 OFFICE O/P EST LOW 20 MIN: CPT | Performed by: FAMILY MEDICINE

## 2018-09-17 PROCEDURE — G8417 CALC BMI ABV UP PARAM F/U: HCPCS | Performed by: FAMILY MEDICINE

## 2018-09-17 PROCEDURE — 3017F COLORECTAL CA SCREEN DOC REV: CPT | Performed by: FAMILY MEDICINE

## 2018-09-17 RX ORDER — PREDNISONE 10 MG/1
TABLET ORAL
Qty: 20 TABLET | Refills: 0 | Status: SHIPPED | OUTPATIENT
Start: 2018-09-17 | End: 2018-09-27

## 2018-09-17 ASSESSMENT — ENCOUNTER SYMPTOMS
EYE REDNESS: 0
WHEEZING: 1
NAUSEA: 0
SHORTNESS OF BREATH: 1
SORE THROAT: 0
APNEA: 0
DIARRHEA: 0
EYE DISCHARGE: 0
HEARTBURN: 0
CHEST TIGHTNESS: 0
COUGH: 1
HEMOPTYSIS: 0
CONSTIPATION: 0
RHINORRHEA: 0
STRIDOR: 0
PHOTOPHOBIA: 0
FACIAL SWELLING: 0
ABDOMINAL PAIN: 0
EYE PAIN: 0
COLOR CHANGE: 0
CHOKING: 0

## 2018-09-17 NOTE — LETTER
[facility]  [address] [address] [address]  Phone: [phone]  Fax: [id number] [place]        September 17, 2018     Patient: Shelli Perry   YOB: 1965   Date of Visit: 9/17/2018       To Whom it May Concern:    Yesy Robbins was seen in my clinic on 9/17/2018. He is unable to work 9/17/18 and 9/18/18. If you have any questions or concerns, please don't hesitate to call.     Sincerely,           [facility], DO

## 2018-09-17 NOTE — PROGRESS NOTES
Subjective:      Patient ID: Shaw White is a 48 y.o. male who presents today for:  Chief Complaint   Patient presents with    Cough     9-10-18 was seen and DX with bronchitis, prescribed albuterol sulfate and zpak. C/o SOB and wheezing on exertion, nasal and chest congestion       Cough   This is a recurrent problem. The current episode started 1 to 4 weeks ago. The problem has been waxing and waning. The cough is productive of sputum. Associated symptoms include nasal congestion, shortness of breath and wheezing. Pertinent negatives include no chest pain, chills, ear congestion, ear pain, eye redness, fever, headaches, heartburn, hemoptysis, myalgias, postnasal drip, rash, rhinorrhea, sore throat, sweats or weight loss. Associated symptoms comments: Chest congestion. Exacerbated by: humidity, exertion. Treatments tried: Zpak, Albuterol inhaler, Phenergan with codeine. Patient has a history of sleep apnea. He was currently using a cpap machine with significant relief however it is no longer working and he is in need of a replacement.        Past Medical History:   Diagnosis Date    Abnormal EKG 9/7/2016    Abrasion 8/21/2014    Atypical chest pain 2/18/2013    Class 3 obesity with serious comorbidity and body mass index (BMI) of 60.0 to 69.9 in adult 1/23/2018    Cold sore 8/3/2015    Corneal abrasion     Edema 8/21/2014    Edema leg     lower leg    ETD (eustachian tube dysfunction) 4/17/2013    Hemorrhoids 1/9/2015    Hyperglycemia 9/7/2016    Knee pain, right     Lateral epicondylitis, R 2/18/2013    LBP (low back pain) 4/22/2014    Left otitis media 4/17/2013    Leg length discrepancy     Leg wound, right 8/21/2014    Osteoarthritis     Other and unspecified hyperlipidemia 8/15/2016    Pes planus     Pleuritis 2/18/2013    Right knee pain 8/15/2012    Rotator cuff tendinitis     Spondylarthrosis 5/7/2012    Thoracolumbar back pain 5/7/2012    Thoracolumbar back pain 5/7/2012

## 2018-09-20 RX ORDER — SULFAMETHOXAZOLE AND TRIMETHOPRIM 800; 160 MG/1; MG/1
1 TABLET ORAL 2 TIMES DAILY
COMMUNITY
Start: 2018-09-20 | End: 2018-09-30

## 2018-09-21 ENCOUNTER — TELEPHONE (OUTPATIENT)
Dept: ADMISSION | Age: 53
End: 2018-09-21

## 2018-09-27 ENCOUNTER — HOSPITAL ENCOUNTER (OUTPATIENT)
Dept: WOUND CARE | Age: 53
Discharge: HOME | End: 2018-09-27
Payer: COMMERCIAL

## 2018-09-27 VITALS
TEMPERATURE: 96.4 F | BODY MASS INDEX: 46.65 KG/M2 | HEART RATE: 67 BPM | RESPIRATION RATE: 20 BRPM | HEIGHT: 69 IN | SYSTOLIC BLOOD PRESSURE: 159 MMHG | WEIGHT: 315 LBS | DIASTOLIC BLOOD PRESSURE: 94 MMHG

## 2018-09-27 PROCEDURE — 99213 OFFICE O/P EST LOW 20 MIN: CPT

## 2018-09-27 NOTE — PROGRESS NOTES
Tobacco abuse     Unspecified sleep apnea     Unstable knee 7/8/2015    Varicosities     Weight loss 12/11/2014       PAST SURGICAL HISTORY    Past Surgical History:   Procedure Laterality Date    COLONOSCOPY  9/25/14    POLYPECTOMY Juan Ferro    FASCIOTOMY Right 2006    triple fasciotomy right leg    TONSILLECTOMY         FAMILY HISTORY    Family History   Problem Relation Age of Onset    Diabetes Mother     Kidney Disease Mother     Cancer Mother         Lung    Cancer Father         Pancreatic    High Blood Pressure Father     Diabetes Other     Cancer Other        SOCIAL HISTORY    Social History   Substance Use Topics    Smoking status: Former Smoker     Quit date: 2/1/2014    Smokeless tobacco: Never Used    Alcohol use Yes       ALLERGIES    No Known Allergies    MEDICATIONS    Current Outpatient Prescriptions on File Prior to Encounter   Medication Sig Dispense Refill    sulfamethoxazole-trimethoprim (BACTRIM DS;SEPTRA DS) 800-160 MG per tablet Take 1 tablet by mouth 2 times daily      predniSONE (DELTASONE) 10 MG tablet 3 per day for 3 days, then 2 per day for 3 days, then1 per day for 3 days, then1/2 per day for 3 days, then stop 20 tablet 0    albuterol sulfate HFA (PROAIR HFA) 108 (90 Base) MCG/ACT inhaler Inhale 2 puffs into the lungs every 6 hours as needed for Wheezing 1 Inhaler 3    benzonatate (TESSALON PERLES) 100 MG capsule Take 2 capsules by mouth 3 times daily as needed for Cough 60 capsule 1    lidocaine (LIDODERM) 5 % Place 1 patch onto the skin daily 12 hours on, 12 hours off. 30 patch 3    gentamicin (GARAMYCIN) 0.1 % ointment Apply topically 3 times daily.  15 g 3    triamterene-hydrochlorothiazide (DYAZIDE) 37.5-25 MG per capsule Take 1 capsule by mouth daily 30 capsule 3    furosemide (LASIX) 20 MG tablet Take 1 tablet by mouth daily 30 tablet 5    fluticasone (FLONASE) 50 MCG/ACT nasal spray 2 sprays by Nasal route daily 1 Bottle 0    diclofenac (VOLTAREN) 75 MG Spondylarthrosis M47.9    Thoracolumbar back pain M54.5, M54.6    Right knee pain M25.561    Lateral epicondylitis, R M77.10    Pleuritis R09.1    Atypical chest pain R07.89    Tobacco abuse Z72.0    ETD (eustachian tube dysfunction) H69.80    Left otitis media H66.92    Low back pain M54.5    Leg wound, right S81.801A    Abrasion T14. 8XXA    Leg wound, right S81.801A    Edema R60.9    Weight loss R63.4    Hemorrhoids K64.9    Unstable knee M25.369    Cold sore B00.1    Anal fissure K60.2    Other and unspecified hyperlipidemia E78.5    Abnormal EKG R94.31    Hyperglycemia R73.9    Synovitis and tenosynovitis, unspecified M65.9    Class 3 obesity with serious comorbidity and body mass index (BMI) of 60.0 to 69.9 in adult ([facility]) E66.9, Z68.44    Tingling in extremities, LUE R20.2    Non-pressure chronic ulcer of other part of right lower leg with fat layer exposed ([facility] [address] L97.812    Venous insufficiency (chronic) (peripheral) I87.2        Procedure Note  Indications:  Based on my examination of this patient's wound(s)/ulcer(s) today, debridement is not required to promote healing and evaluate the wound base. Plan:     Treatment Note please see attached Discharge Instructions  Will apply Apligraf next visit. In my professional opinion this patient would benefit from HBO Therapy: No    Written patient dismissal instructions given to patient and signed by patient or POA. Discharge Instructions       Home Care:  none     Wound Location:  Right Lower Lateral Leg     Dressing orders: 1. Cleanse wound(s) with normal saline. 2. Apply dry ASHLEE  Or equivalent to wound bed. 3. Moisten ASHLEE with a few drops of normal saline. 4. Cover with 4x4's and wrap with gauze (amy or kerlix)  5.  Change  Every other day or Monday, Wednesday, and Friday     Compression:  Apply medium 10-20mmHg compression hose to right lower legs, may remove at bedtime, reapply first thing in the morning, avoid prolonged standing, elevate legs when sitting     Other Instructions: Culture in [facility] today     Keep all dressings clean & dry. Do not shower, take baths or get wound wet, unless otherwise instructed by your Wound Care doctor     Follow up visit   3 Weeks  October 18, 2018 at     For Diabetic patients keep blood sugars below 150 for optimal wound healing.     If you experience any of the following, please call the Wound Care Service during business hours: [phone]              *Increase in pain   *Temperature over 101   *Increase in drainage from your wound or a foul odor   *Uncontrolled swelling    *Need for compression bandage changes due to slippage, breakthrough drainage     If you need medical attention outside of business hours, please contact your Primary Care Doctor or go to the nearest emergency room. Keep next scheduled appointment. Nichole Lopez give 24 hour notice if unable to keep appointment. [phone]     PLEASE NOTE: IF YOU ARE UNABLE TO OBTAIN WOUND SUPPLIES, CONTINUE TO USE THE SUPPLIES YOU HAVE AVAILABLE UNTIL YOU ARE ABLE TO REACH US.  IT IS MOST IMPORTANT TO KEEP THE WOUND COVERED AT ALL TIMES  Electronically signed by Sheree Jimenez DPM on 9/27/2018 at 2:51 PM          Electronically signed by Sheree Jimenez DPM on 9/27/2018 at 2:52 PM

## 2018-10-09 DIAGNOSIS — M54.6 THORACOLUMBAR BACK PAIN: ICD-10-CM

## 2018-10-09 DIAGNOSIS — M54.50 THORACOLUMBAR BACK PAIN: ICD-10-CM

## 2018-10-09 DIAGNOSIS — M15.9 PRIMARY OSTEOARTHRITIS INVOLVING MULTIPLE JOINTS: ICD-10-CM

## 2018-10-09 DIAGNOSIS — M47.816 SPONDYLOSIS OF LUMBAR REGION WITHOUT MYELOPATHY OR RADICULOPATHY: ICD-10-CM

## 2018-10-09 RX ORDER — HYDROCODONE BITARTRATE AND ACETAMINOPHEN 7.5; 325 MG/1; MG/1
1 TABLET ORAL EVERY 6 HOURS PRN
Qty: 60 TABLET | Refills: 0 | Status: SHIPPED | OUTPATIENT
Start: 2018-10-09 | End: 2018-12-27 | Stop reason: SDUPTHER

## 2018-10-18 ENCOUNTER — HOSPITAL ENCOUNTER (OUTPATIENT)
Dept: WOUND CARE | Age: 53
Discharge: HOME | End: 2018-10-18
Payer: COMMERCIAL

## 2018-10-18 VITALS
HEIGHT: 69 IN | BODY MASS INDEX: 46.65 KG/M2 | HEART RATE: 75 BPM | DIASTOLIC BLOOD PRESSURE: 71 MMHG | TEMPERATURE: 95.9 F | RESPIRATION RATE: 20 BRPM | WEIGHT: 315 LBS | SYSTOLIC BLOOD PRESSURE: 135 MMHG

## 2018-10-18 PROCEDURE — 15271 SKIN SUB GRAFT TRNK/ARM/LEG: CPT

## 2018-10-18 NOTE — PROGRESS NOTES
Wt (!) 482 lb (218.6 kg)   BMI 71.18 kg/m²     Wt Readings from Last 3 Encounters:   10/18/18 (!) 482 lb (218.6 kg)   09/27/18 (!) 482 lb (218.6 kg)   09/17/18 (!) 482 lb (218.6 kg)       PHYSICAL EXAM    Constitutional:   Well nourished and well developed. Appears neat and clean. Patient is alert, oriented x3, and in no apparent distress. Respiratory:  Respiratory effort is easy and symmetric bilaterally. Rate is normal at rest and on room air. Vascular:  Pedal Pulses is palpable and audible with doppler. Capillary refill is <3 sec to digits bilateral.  Extremities negative for pitting edema. Neurological:   Sensation is intact to lower extremities. Dermatological:  Wound description noted in wound assessment. The wound(s) are stable. No signs of infection. Psychiatric:  Judgement and insight intact. Short and long term memory intact. No evidence of depression, anxiety, or agitation. Patient is calm, cooperative, and communicative. Appropriate interactions and affect. Assessment:      Patient Active Problem List   Diagnosis Code    Osteoarthritis M19.90    Sleep apnea G47.30    Finger infection L08.9    Spondylarthrosis M47.9    Thoracolumbar back pain M54.5, M54.6    Right knee pain M25.561    Lateral epicondylitis, R M77.10    Pleuritis R09.1    Atypical chest pain R07.89    Tobacco abuse Z72.0    ETD (eustachian tube dysfunction) H69.80    Left otitis media H66.92    Low back pain M54.5    Leg wound, right S81.801A    Abrasion T14. 8XXA    Leg wound, right S81.801A    Edema R60.9    Weight loss R63.4    Hemorrhoids K64.9    Unstable knee M25.369    Cold sore B00.1    Anal fissure K60.2    Other and unspecified hyperlipidemia E78.5    Abnormal EKG R94.31    Hyperglycemia R73.9    Synovitis and tenosynovitis, unspecified M65.9    Class 3 obesity with serious comorbidity and body mass index (BMI) of 60.0 to 69.9 in adult [id number]    Tingling in Margins Defined edges 8/16/2018  1:17 PM   Yue-wound Assessment Red;Dry 10/18/2018  1:29 PM   Non-staged Wound Description Full thickness 10/18/2018  1:29 PM   [place]%Wound Bed 50 8/2/2018  1:00 PM   Red%Wound Bed 60 10/18/2018  1:29 PM   Yellow%Wound Bed 40 10/18/2018  1:29 PM   Op First Treatment Date 05/24/18 5/24/2018  1:04 PM   Number of days: 147          Total Surface Area of Ulcer(s) Covered 6.72 sq/cm    Was the Product Layered  Yes     Amount of Product Applied 44 sq/cm     Amount of Product Wasted 0 sq/cm     Reason for Waste N/A      Surgically Fixated: Yes    Secured With: Steri Strips and Mepitel     Procedural Pain: 0/10     Post Procedural Pain: 0 / 10    Response to Treatment:  Well tolerated by patient. Problem List Items Addressed This Visit     None                        Plan:             Treatment Note please see attached Discharge Instructions    Written patient dismissal instructions given to patient and signed by patient or POA. Discharge Instructions       Visit Discharge/Physician Orders:    Home Care: none    Wound Location:  Right Lateral Leg    Dressing orders: Apligraf secured with steristrips and mepitel, calcium alginate,DSD,kerlix, coban. Keep clean and dry for 1 week till next appt. Elevate right leg    Compression: none    Other Instructions:   Patient to be off work till Monday, October 22, 2018    Keep all dressings clean & dry. Keep pressure off the wound(s) at all times. Do not shower, take baths or get wound wet, unless otherwise instructed by your Wound Care doctor. Follow up visit   1 Weeks    For Diabetic patients keep blood sugars below 150 for optimal wound healing.     If you experience any of the following, please call the Wound Care Service during business hours: [phone]   *Increase in pain   *Temperature over 101   *Increase in drainage from your wound or a foul odor   *Uncontrolled swelling   *Need for compression bandage changes due to

## 2018-10-18 NOTE — CODE DOCUMENTATION
[id number] Physician Billing Sheet. Ale Jovel  AGE: 48 y.o.    GENDER: male  : 1965  TODAY'S DATE:  10/18/2018    ICD-10  [facility] [address] Problems    Diagnosis Date Noted    Non-pressure chronic ulcer of other part of right lower leg with fat layer exposed ([facility] [address] [L97.812] 2018    Venous insufficiency (chronic) (peripheral) [I87.2] 2018       PHYSICIAN PROCEDURES    CPT CODE  84220     MOD RT      Electronically signed by Jazmine Velazquez DPM on 10/18/2018 at 2:23 PM

## 2018-10-25 ENCOUNTER — HOSPITAL ENCOUNTER (OUTPATIENT)
Dept: WOUND CARE | Age: 53
Discharge: HOME | End: 2018-10-25
Payer: COMMERCIAL

## 2018-10-25 VITALS
HEART RATE: 77 BPM | TEMPERATURE: 97.4 F | DIASTOLIC BLOOD PRESSURE: 68 MMHG | RESPIRATION RATE: 22 BRPM | SYSTOLIC BLOOD PRESSURE: 136 MMHG

## 2018-10-25 PROCEDURE — 99213 OFFICE O/P EST LOW 20 MIN: CPT

## 2018-10-25 NOTE — PROGRESS NOTES
pressure off the wound(s) at all times. Do not shower, take baths or get wound wet, unless otherwise instructed by your Wound Care doctor.     Follow up visit   1 Weeks  November 1, 2018 at     For Diabetic patients keep blood sugars below 150 for optimal wound healing.     If you experience any of the following, please call the Wound Care Service during business hours: [phone]         *Increase in pain   *Temperature over 101   *Increase in drainage from your wound or a foul odor   *Uncontrolled swelling   *Need for compression bandage changes due to slippage, breakthrough drainage                                                                                                                                                                                                                                                                                                                                                                                              If you need medical attention outside of business hours, please contact your Primary Care Doctor or go to the nearest emergency room. Keep next scheduled appointment. Please give 24 hour notice if unable to keep appointment. [phone]     PLEASE NOTE: IF YOU ARE UNABLE TO OBTAIN WOUND SUPPLIES, CONTINUE TO USE THE SUPPLIES YOU HAVE AVAILABLE UNTIL YOU ARE ABLE TO REACH US.  IT IS MOST IMPORTANT TO KEEP THE WOUND COVERED AT ALL TIMES   Electronically signed by Sheba Lomas DPM on 10/25/2018 at 1:34 PM          Electronically signed by Sheba Lomas DPM on 10/25/2018 at 1:34 PM

## 2018-11-01 ENCOUNTER — HOSPITAL ENCOUNTER (OUTPATIENT)
Dept: WOUND CARE | Age: 53
Discharge: HOME | End: 2018-11-01
Payer: COMMERCIAL

## 2018-11-01 VITALS
HEART RATE: 87 BPM | DIASTOLIC BLOOD PRESSURE: 74 MMHG | TEMPERATURE: 98.5 F | RESPIRATION RATE: 20 BRPM | SYSTOLIC BLOOD PRESSURE: 120 MMHG

## 2018-11-01 PROCEDURE — 99213 OFFICE O/P EST LOW 20 MIN: CPT

## 2018-11-01 NOTE — PROGRESS NOTES
11/1/2018  1:45 PM   Change in Wound Size % (l*w) -210.56 11/1/2018  1:45 PM   Drainage Amount Moderate 11/1/2018  1:45 PM   Drainage Description Serosanguinous; Yellow 11/1/2018  1:45 PM   Odor None 11/1/2018  1:45 PM   Margins Defined edges 8/16/2018  1:17 PM   Yue-wound Assessment Excoriated 11/1/2018  1:45 PM   Non-staged Wound Description Full thickness 11/1/2018  1:45 PM   [place]%Wound Bed 95 11/1/2018  1:45 PM   Red%Wound Bed 60 10/18/2018  1:29 PM   Yellow%Wound Bed 5 11/1/2018  1:45 PM   Op First Treatment Date 05/24/18 5/24/2018  1:04 PM   Number of days: 161       Assessment:      Patient Active Problem List   Diagnosis Code    Osteoarthritis M19.90    Sleep apnea G47.30    Finger infection L08.9    Spondylarthrosis M47.9    Thoracolumbar back pain M54.5, M54.6    Right knee pain M25.561    Lateral epicondylitis, R M77.10    Pleuritis R09.1    Atypical chest pain R07.89    Tobacco abuse Z72.0    ETD (eustachian tube dysfunction) H69.80    Left otitis media H66.92    Low back pain M54.5    Leg wound, right S81.801A    Abrasion T14. 8XXA    Leg wound, right S81.801A    Edema R60.9    Weight loss R63.4    Hemorrhoids K64.9    Unstable knee M25.369    Cold sore B00.1    Anal fissure K60.2    Other and unspecified hyperlipidemia E78.5    Abnormal EKG R94.31    Hyperglycemia R73.9    Synovitis and tenosynovitis, unspecified M65.9    Class 3 obesity with serious comorbidity and body mass index (BMI) of 60.0 to 69.9 in adult [id number]    Tingling in extremities, LUE R20.2    Non-pressure chronic ulcer of other part of right lower leg with fat layer exposed ([facility] [address] L97.812    Venous insufficiency (chronic) (peripheral) I87.2        Procedure Note  Indications:  Based on my examination of this patient's wound(s)/ulcer(s) today, debridement is not required to promote healing and evaluate the wound base.       Plan:     Treatment Note please see attached Discharge Instructions  Will start Ghazala dressing changes every other day and plan application of Apligraf#2 in 2 weeks. In my professional opinion this patient would benefit from HBO Therapy: No    Written patient dismissal instructions given to patient and signed by patient or POA. Discharge Instructions       Visit Discharge/Physician Orders:     Home Care: none     Wound Location:  Right Lateral Leg     Dressing orders: 1. Cleanse wound(s) with normal saline. 2. Apply dry ASHLEE  Or equivalent to wound bed. 3. Moisten ASHLEE with a few drops of normal saline. 4. Cover with 4x4's and wrap with gauze (amy or kerlix)  5. Change  Every other day or Monday, Wednesday, and Friday      Compression: Apply med comp (10-20) Spandagrip to right lower leg, apply first thing in the morning, remove at bedtime, elevate legs as much as possible during the day.     Other Instructions:  Use Ammonium Lactate to right foot and leg around wound     Keep all dressings clean & dry.  Keep pressure off the wound(s) at all times. Do not shower, take baths or get wound wet, unless otherwise instructed by your Wound Care doctor.     Follow up visit   2 Weeks  November 15, 2018 at              For possible Apligraf aplication     For Diabetic patients keep blood sugars below 150 for optimal wound healing.     If you experience any of the following, please call the Wound Care Service during business hours: [phone]         *Increase in pain   *Temperature over 101   *Increase in drainage from your wound or a foul odor   *Uncontrolled swelling   *Need for compression bandage changes due to slippage, breakthrough drainage     If you need medical attention outside of business hours, please contact your Primary Care Doctor or go to the nearest emergency room. Keep next scheduled appointment. Ana Joiner give 24 hour notice if unable to keep appointment.  [phone]     PLEASE NOTE: IF YOU ARE UNABLE TO OBTAIN WOUND SUPPLIES, CONTINUE TO USE THE SUPPLIES YOU HAVE

## 2018-11-01 NOTE — CODE DOCUMENTATION
[address] Physician Billing Sheet. Antoni Poe  AGE: 48 y.o.    GENDER: male  : 1965  TODAY'S DATE:  2018    ICD-10 [address] [facility] [address] Problems    Diagnosis Date Noted    Non-pressure chronic ulcer of other part of right lower leg with fat layer exposed ([facility] [address] [L97.812] 2018    Venous insufficiency (chronic) (peripheral) [I87.2] 2018    Edema [R60.9] 2014       PHYSICIAN PROCEDURES    CPT CODE  32643        Electronically signed by Gabriella Diallo DPM on 2018 at 2:13 PM

## 2018-11-15 ENCOUNTER — HOSPITAL ENCOUNTER (OUTPATIENT)
Dept: WOUND CARE | Age: 53
Discharge: HOME | End: 2018-11-15
Payer: COMMERCIAL

## 2018-11-15 VITALS
HEART RATE: 69 BPM | SYSTOLIC BLOOD PRESSURE: 151 MMHG | RESPIRATION RATE: 20 BRPM | DIASTOLIC BLOOD PRESSURE: 77 MMHG | TEMPERATURE: 98 F

## 2018-11-15 PROCEDURE — 15271 SKIN SUB GRAFT TRNK/ARM/LEG: CPT

## 2018-11-15 NOTE — PROGRESS NOTES
°C) (Temporal)   Resp 20     Wt Readings from Last 3 Encounters:   10/18/18 (!) 482 lb (218.6 kg)   09/27/18 (!) 482 lb (218.6 kg)   09/17/18 (!) 482 lb (218.6 kg)       PHYSICAL EXAM    Constitutional:   Well nourished and well developed. Appears neat and clean. Patient is alert, oriented x3, and in no apparent distress. Respiratory:  Respiratory effort is easy and symmetric bilaterally. Rate is normal at rest and on room air. Vascular:  Pedal Pulses is palpable and audible with doppler. Capillary refill is <3 sec to digits bilateral.  Extremities negative for pitting edema. Neurological:   Sensation is intact to lower extremities. Dermatological:  Wound description noted in wound assessment. The wound(s) are stable with granular base. No ewelina-wound erythema, increased temperature or purulent drainage noted. Psychiatric:  Judgement and insight intact. Short and long term memory intact. No evidence of depression, anxiety, or agitation. Patient is calm, cooperative, and communicative. Appropriate interactions and affect. Assessment:      Patient Active Problem List   Diagnosis Code    Osteoarthritis M19.90    Sleep apnea G47.30    Finger infection L08.9    Spondylarthrosis M47.9    Thoracolumbar back pain M54.5, M54.6    Right knee pain M25.561    Lateral epicondylitis, R M77.10    Pleuritis R09.1    Atypical chest pain R07.89    Tobacco abuse Z72.0    ETD (eustachian tube dysfunction) H69.80    Left otitis media H66.92    Low back pain M54.5    Leg wound, right S81.801A    Abrasion T14. 8XXA    Leg wound, right S81.801A    Edema R60.9    Weight loss R63.4    Hemorrhoids K64.9    Unstable knee M25.369    Cold sore B00.1    Anal fissure K60.2    Other and unspecified hyperlipidemia E78.5    Abnormal EKG R94.31    Hyperglycemia R73.9    Synovitis and tenosynovitis, unspecified M65.9    Class 3 obesity with serious comorbidity and body mass index (BMI) of 60.0 Number of days: 175       Percent of Wound/Ulcer Debrided: 100%    Total Surface Area Debrided:  6.3 sq cm     Diabetic/Pressure/Non Pressure Ulcers:  Ulcer: Non-Pressure ulcer, fat layer exposed      Bleeding:  Minimal    Hemostasis Achieved:  by pressure    Procedural Pain:  0  / 10     Post Procedural Pain:  0 / 10     Response to treatment:  Well tolerated by patient.      Skin Substitue Procedure Note  Procedure:  Skin Substitute Application    Performed by: Jessica Fong DPM    Ulcer Type:venous    Consent obtained: Yes    Time out taken: Yes    Product Utilized:   Application # 2      [x] Apligraf   [x]44 sq/cm   []88 sq/cm    []132 sq/cm  []176  Sq/cm        [] Dermagraft   [] 38 sq/cm   [] 76 sq/cm                             [] Affinity       [] 2.25 sq/cm       []  6.25 sq/cm                      [] PuraPly AM  []1.6 sq/cm   [] 4 sq/cm   [] 8 sq/cm    [] 25 sq/cm   []54 sq/cm                   []  NuShield      [] 1.6 sq/cm   [] 6 sq/cm   []8 sq/cm    []12  sq/cm   []16 sq/cm                            [] TheraSkin  [] 13 sq/cm    [] 39 sq/cm          Product Lot #:  [id number]  Product Expiration Date: 11-    0.9% Normal Saline: Lot # [id number]  0.9% Normal Saline: Expiration Date: 03/2020          Fenestrated: Yes    Mesher Utilized:  No    Skin Substitute was Applied to Ulcer Number(s):    Ulcer #: 1      Wound 05/24/18 #1 right lower lateral leg (Active)   Wound Image   11/15/2018  2:43 PM   Wound Type Wound 11/15/2018  2:43 PM   Wound Venous 11/15/2018  2:43 PM   Wound Cleansed Rinsed/Irrigated with saline 11/15/2018  2:43 PM   Wound Length (cm) 4.2 cm 11/15/2018  2:43 PM   Wound Width (cm) 1.5 cm 11/15/2018  2:43 PM   Wound Depth (cm)  0.2 11/15/2018  2:43 PM   Calculated Wound Size (cm^2) (l*w) 6.3 cm^2 11/15/2018  2:43 PM   Change in Wound Size % (l*w) -250 11/15/2018  2:43 PM   Drainage Amount Moderate 11/15/2018  2:43 PM   Drainage Description Serosanguinous 11/15/2018  2:43 PM   Odor

## 2018-11-30 ENCOUNTER — HOSPITAL ENCOUNTER (OUTPATIENT)
Dept: WOUND CARE | Age: 53
Discharge: HOME | End: 2018-11-30
Payer: COMMERCIAL

## 2018-11-30 VITALS
TEMPERATURE: 97.6 F | SYSTOLIC BLOOD PRESSURE: 136 MMHG | DIASTOLIC BLOOD PRESSURE: 68 MMHG | HEART RATE: 79 BPM | RESPIRATION RATE: 20 BRPM

## 2018-11-30 DIAGNOSIS — L97.812 NON-PRESSURE CHRONIC ULCER OF OTHER PART OF RIGHT LOWER LEG WITH FAT LAYER EXPOSED (HCC): ICD-10-CM

## 2018-11-30 DIAGNOSIS — L40.9 PSORIASIS: Primary | ICD-10-CM

## 2018-11-30 PROCEDURE — 11042 DBRDMT SUBQ TIS 1ST 20SQCM/<: CPT

## 2018-11-30 NOTE — PROGRESS NOTES
[facility] [address]                                                   Progress Note and Procedure Note      [address] RECORD NUMBER:  [id number]  AGE: 48 y.o. GENDER: male  : 1965  EPISODE DATE:  2018    Subjective:     Chief Complaint   Patient presents with    Wound Check     right leg wound recheck         HISTORY of PRESENT ILLNESS HPI     Antoni Poe is a 48 y.o. male who presents today for wound/ulcer evaluation. History of Wound Context: Patient is seen by Dr. Roxanna Liriano, here today due to missed appointment yesterday. Patient had 2nd apligraf applied two weeks ago in wound care center. Patient saw Dr. Roxanna Liriano last week for follow-up but forgot about appointment for yesterday. Patient states he has been wearing tubigrip for compression. States the wound is significantly improved from his initial presentation. Denies any fevers, chills, nausea or vomiting.    Wound/Ulcer Pain Timing/Severity: intermittent  Quality of pain: aching  Severity:  3 / 10   Modifying Factors: None  Associated Signs/Symptoms: none    Ulcer Identification:  Ulcer Type: venous  Contributing Factors: edema, venous stasis and obesity    Wound: N/A        PAST MEDICAL HISTORY        Diagnosis Date    Abnormal EKG 2016    Abrasion 2014    Atypical chest pain 2013    Class 3 obesity with serious comorbidity and body mass index (BMI) of 60.0 to 69.9 in adult 2018    Cold sore 8/3/2015    Corneal abrasion     Edema 2014    Edema leg     lower leg    ETD (eustachian tube dysfunction) 2013    Hemorrhoids 2015    Hyperglycemia 2016    Knee pain, right     Lateral epicondylitis, R 2013    LBP (low back pain) 2014    Left otitis media 2013    Leg length discrepancy     Leg wound, right 2014    Osteoarthritis     Other and unspecified hyperlipidemia 8/15/2016    Pes planus     Pleuritis 2013    Right knee pain 8/15/2012    Rotator cuff tendinitis     Spondylarthrosis 5/7/2012    Thoracolumbar back pain 5/7/2012    Thoracolumbar back pain 5/7/2012    Tingling in extremities, LUE 2/2/2018    Tobacco abuse 2/18/2013    Tobacco abuse     Unspecified sleep apnea     Unstable knee 7/8/2015    Varicosities     Weight loss 12/11/2014       PAST SURGICAL HISTORY    Past Surgical History:   Procedure Laterality Date    COLONOSCOPY  9/25/14    POLYPECTOMY Darrell Liner    FASCIOTOMY Right 2006    triple fasciotomy right leg    TONSILLECTOMY         FAMILY HISTORY    Family History   Problem Relation Age of Onset    Diabetes Mother     Kidney Disease Mother     Cancer Mother         Lung    Cancer Father         Pancreatic    High Blood Pressure Father     Diabetes Other     Cancer Other        SOCIAL HISTORY    Social History   Substance Use Topics    Smoking status: Former Smoker     Quit date: 2/1/2014    Smokeless tobacco: Never Used    Alcohol use Yes       ALLERGIES    No Known Allergies    MEDICATIONS    Current Outpatient Prescriptions on File Prior to Encounter   Medication Sig Dispense Refill    HYDROcodone-acetaminophen (NORCO) 7.5-325 MG per tablet Take 1 tablet by mouth every 6 hours as needed for Pain for up to 90 days. . 60 tablet 0    albuterol sulfate HFA (PROAIR HFA) 108 (90 Base) MCG/ACT inhaler Inhale 2 puffs into the lungs every 6 hours as needed for Wheezing 1 Inhaler 3    benzonatate (TESSALON PERLES) 100 MG capsule Take 2 capsules by mouth 3 times daily as needed for Cough 60 capsule 1    lidocaine (LIDODERM) 5 % Place 1 patch onto the skin daily 12 hours on, 12 hours off. 30 patch 3    gentamicin (GARAMYCIN) 0.1 % ointment Apply topically 3 times daily.  15 g 3    triamterene-hydrochlorothiazide (DYAZIDE) 37.5-25 MG per capsule Take 1 capsule by mouth daily 30 capsule 3    furosemide (LASIX) 20 MG tablet Take 1 tablet by mouth daily 30 tablet 5    fluticasone (FLONASE) 50 MCG/ACT nasal spray 2 sprays by wound, right S81.801A    Edema R60.9    Weight loss R63.4    Hemorrhoids K64.9    Unstable knee M25.369    Cold sore B00.1    Anal fissure K60.2    Other and unspecified hyperlipidemia E78.5    Abnormal EKG R94.31    Hyperglycemia R73.9    Synovitis and tenosynovitis, unspecified M65.9    Class 3 obesity with serious comorbidity and body mass index (BMI) of 60.0 to 69.9 in adult [id number]    Tingling in extremities, LUE R20.2    Non-pressure chronic ulcer of other part of right lower leg with fat layer exposed ([address] L97.812    Venous insufficiency (chronic) (peripheral) I87.2        Procedure Note  Indications:  Based on my examination of this patient's wound(s)/ulcer(s) today, debridement is required to promote healing and evaluate the wound base. Performed by: Maury Nuñez DPM    Consent obtained:  Yes    Time out taken:  Yes    Pain Control: Anesthetic  Anesthetic: None       Debridement:Excisional Debridement    Using curette the wound(s)/ulcer(s) was/were sharply debrided down through and including the removal of subcutaneous tissue. Devitalized Tissue Debrided:  fibrin, biofilm and slough    Pre Debridement Measurements:  Are located in the [place]  Documentation Flow Sheet    Wound/Ulcer #: 1    Post Debridement Measurements:  Wound/Ulcer Descriptions are Pre Debridement except measurements:    Wound 05/24/18 Leg Right; Lower; Lateral #1 (Active)   Wound Image   11/30/2018 10:26 AM   Wound Venous 11/30/2018 10:26 AM   Wound Cleansed Rinsed/Irrigated with saline 11/30/2018 10:26 AM   Wound Length (cm) 3.5 cm 11/30/2018 10:26 AM   Wound Width (cm) 0.9 cm 11/30/2018 10:26 AM   Wound Depth (cm) 0.2 cm 11/30/2018 10:26 AM   Wound Surface Area (cm^2) 3.15 cm^2 11/30/2018 10:26 AM   Wound Volume (cm^3) 0.63 cm^3 11/30/2018 10:26 AM   Drainage Amount Small 11/30/2018 10:26 AM   Drainage Description Serosanguinous 11/30/2018 10:26 AM   Odor None 11/30/2018 10:26 AM   Margins Defined

## 2018-11-30 NOTE — CODE DOCUMENTATION
[facility] Physician Billing Sheet. Scar Marquez  AGE: 48 y.o.    GENDER: male  : 1965  TODAY'S DATE:  2018    ICD-10  [facility] [address] Problems    Diagnosis Date Noted    Non-pressure chronic ulcer of other part of right lower leg with fat layer exposed ([facility] [address] [L97.812] 2018    Venous insufficiency (chronic) (peripheral) [I87.2] 2018    Edema [R60.9] 2014    Tobacco abuse [Z72.0] 2013       PHYSICIAN PROCEDURES    CPT CODE  73770      Electronically signed by Bettie Figueroa DPM on 2018 at 11:06 AM

## 2018-12-13 ENCOUNTER — HOSPITAL ENCOUNTER (OUTPATIENT)
Dept: WOUND CARE | Age: 53
Discharge: HOME | End: 2018-12-13
Payer: COMMERCIAL

## 2018-12-13 VITALS
BODY MASS INDEX: 46.65 KG/M2 | HEART RATE: 80 BPM | WEIGHT: 315 LBS | SYSTOLIC BLOOD PRESSURE: 147 MMHG | DIASTOLIC BLOOD PRESSURE: 74 MMHG | HEIGHT: 69 IN | TEMPERATURE: 97.7 F | RESPIRATION RATE: 20 BRPM

## 2018-12-13 PROCEDURE — 11042 DBRDMT SUBQ TIS 1ST 20SQCM/<: CPT

## 2018-12-13 NOTE — PROGRESS NOTES
sleep apnea     Unstable knee 7/8/2015    Varicosities     Weight loss 12/11/2014       PAST SURGICAL HISTORY    Past Surgical History:   Procedure Laterality Date    COLONOSCOPY  9/25/14    POLYPECTOMY [place]    FASCIOTOMY Right 2006    triple fasciotomy right leg    TONSILLECTOMY         FAMILY HISTORY    Family History   Problem Relation Age of Onset    Diabetes Mother     Kidney Disease Mother     Cancer Mother         Lung    Cancer Father         Pancreatic    High Blood Pressure Father     Diabetes Other     Cancer Other        SOCIAL HISTORY    Social History   Substance Use Topics    Smoking status: Former Smoker     Quit date: 2/1/2014    Smokeless tobacco: Never Used    Alcohol use Yes       ALLERGIES    No Known Allergies    MEDICATIONS    Current Outpatient Prescriptions on File Prior to Encounter   Medication Sig Dispense Refill    HYDROcodone-acetaminophen (NORCO) 7.5-325 MG per tablet Take 1 tablet by mouth every 6 hours as needed for Pain for up to 90 days. . 60 tablet 0    albuterol sulfate HFA (PROAIR HFA) 108 (90 Base) MCG/ACT inhaler Inhale 2 puffs into the lungs every 6 hours as needed for Wheezing 1 Inhaler 3    benzonatate (TESSALON PERLES) 100 MG capsule Take 2 capsules by mouth 3 times daily as needed for Cough 60 capsule 1    lidocaine (LIDODERM) 5 % Place 1 patch onto the skin daily 12 hours on, 12 hours off. 30 patch 3    gentamicin (GARAMYCIN) 0.1 % ointment Apply topically 3 times daily. 15 g 3    triamterene-hydrochlorothiazide (DYAZIDE) 37.5-25 MG per capsule Take 1 capsule by mouth daily 30 capsule 3    furosemide (LASIX) 20 MG tablet Take 1 tablet by mouth daily 30 tablet 5    fluticasone (FLONASE) 50 MCG/ACT nasal spray 2 sprays by Nasal route daily 1 Bottle 0    diclofenac (VOLTAREN) 75 MG EC tablet Take 1 tablet by mouth 2 times daily 60 tablet 3     No current facility-administered medications on file prior to encounter.         REVIEW OF

## 2018-12-27 ENCOUNTER — OFFICE VISIT (OUTPATIENT)
Dept: PRIMARY CARE CLINIC | Age: 53
End: 2018-12-27
Payer: COMMERCIAL

## 2018-12-27 ENCOUNTER — HOSPITAL ENCOUNTER (OUTPATIENT)
Dept: WOUND CARE | Age: 53
Discharge: HOME | End: 2018-12-27
Payer: COMMERCIAL

## 2018-12-27 VITALS
BODY MASS INDEX: 46.65 KG/M2 | HEIGHT: 69 IN | HEART RATE: 74 BPM | TEMPERATURE: 98 F | DIASTOLIC BLOOD PRESSURE: 84 MMHG | OXYGEN SATURATION: 98 % | WEIGHT: 315 LBS | SYSTOLIC BLOOD PRESSURE: 132 MMHG

## 2018-12-27 DIAGNOSIS — R63.5 WEIGHT GAIN: ICD-10-CM

## 2018-12-27 DIAGNOSIS — M54.6 THORACOLUMBAR BACK PAIN: ICD-10-CM

## 2018-12-27 DIAGNOSIS — I87.2 VENOUS INSUFFICIENCY (CHRONIC) (PERIPHERAL): ICD-10-CM

## 2018-12-27 DIAGNOSIS — M15.9 PRIMARY OSTEOARTHRITIS INVOLVING MULTIPLE JOINTS: ICD-10-CM

## 2018-12-27 DIAGNOSIS — M54.50 THORACOLUMBAR BACK PAIN: ICD-10-CM

## 2018-12-27 DIAGNOSIS — M47.816 SPONDYLOSIS OF LUMBAR REGION WITHOUT MYELOPATHY OR RADICULOPATHY: ICD-10-CM

## 2018-12-27 DIAGNOSIS — G47.33 OBSTRUCTIVE SLEEP APNEA SYNDROME: Primary | ICD-10-CM

## 2018-12-27 DIAGNOSIS — B35.4 TINEA CORPORIS: ICD-10-CM

## 2018-12-27 DIAGNOSIS — E66.01 CLASS 3 SEVERE OBESITY DUE TO EXCESS CALORIES WITH SERIOUS COMORBIDITY AND BODY MASS INDEX (BMI) GREATER THAN OR EQUAL TO 70 IN ADULT (HCC): ICD-10-CM

## 2018-12-27 DIAGNOSIS — B35.9 RINGWORM: ICD-10-CM

## 2018-12-27 DIAGNOSIS — F41.9 ANXIETY: ICD-10-CM

## 2018-12-27 PROCEDURE — G8417 CALC BMI ABV UP PARAM F/U: HCPCS | Performed by: FAMILY MEDICINE

## 2018-12-27 PROCEDURE — 99214 OFFICE O/P EST MOD 30 MIN: CPT | Performed by: FAMILY MEDICINE

## 2018-12-27 PROCEDURE — 99213 OFFICE O/P EST LOW 20 MIN: CPT

## 2018-12-27 PROCEDURE — G8427 DOCREV CUR MEDS BY ELIG CLIN: HCPCS | Performed by: FAMILY MEDICINE

## 2018-12-27 PROCEDURE — G8484 FLU IMMUNIZE NO ADMIN: HCPCS | Performed by: FAMILY MEDICINE

## 2018-12-27 PROCEDURE — 1036F TOBACCO NON-USER: CPT | Performed by: FAMILY MEDICINE

## 2018-12-27 PROCEDURE — 3017F COLORECTAL CA SCREEN DOC REV: CPT | Performed by: FAMILY MEDICINE

## 2018-12-27 RX ORDER — HYDROCODONE BITARTRATE AND ACETAMINOPHEN 7.5; 325 MG/1; MG/1
1 TABLET ORAL EVERY 6 HOURS PRN
Qty: 60 TABLET | Refills: 0 | Status: SHIPPED | OUTPATIENT
Start: 2018-12-27 | End: 2019-03-27

## 2018-12-27 ASSESSMENT — ENCOUNTER SYMPTOMS
APNEA: 0
EYE REDNESS: 0
EYE DISCHARGE: 0
COLOR CHANGE: 0
FACIAL SWELLING: 0
PHOTOPHOBIA: 0
DIARRHEA: 0
STRIDOR: 0
NAUSEA: 0
CONSTIPATION: 0
CHOKING: 0
CHEST TIGHTNESS: 0
WHEEZING: 0
EYE PAIN: 0
ABDOMINAL PAIN: 0

## 2018-12-27 NOTE — PROGRESS NOTES
Thoracolumbar back pain 5/7/2012    Thoracolumbar back pain 5/7/2012    Tingling in extremities, LUE 2/2/2018    Tobacco abuse 2/18/2013    Tobacco abuse     Unspecified sleep apnea     Unstable knee 7/8/2015    Varicosities     Weight loss 12/11/2014       PAST SURGICAL HISTORY    Past Surgical History:   Procedure Laterality Date    COLONOSCOPY  9/25/14    POLYPECTOMY Cristiana Dyson    FASCIOTOMY Right 2006    triple fasciotomy right leg    TONSILLECTOMY         FAMILY HISTORY    Family History   Problem Relation Age of Onset    Diabetes Mother     Kidney Disease Mother     Cancer Mother         Lung    Cancer Father         Pancreatic    High Blood Pressure Father     Diabetes Other     Cancer Other        SOCIAL HISTORY    Social History   Substance Use Topics    Smoking status: Former Smoker     Quit date: 2/1/2014    Smokeless tobacco: Never Used    Alcohol use Yes       ALLERGIES    No Known Allergies    MEDICATIONS    Current Outpatient Prescriptions on File Prior to Encounter   Medication Sig Dispense Refill    albuterol sulfate HFA (PROAIR HFA) 108 (90 Base) MCG/ACT inhaler Inhale 2 puffs into the lungs every 6 hours as needed for Wheezing 1 Inhaler 3    lidocaine (LIDODERM) 5 % Place 1 patch onto the skin daily 12 hours on, 12 hours off. 30 patch 3    gentamicin (GARAMYCIN) 0.1 % ointment Apply topically 3 times daily. 15 g 3    triamterene-hydrochlorothiazide (DYAZIDE) 37.5-25 MG per capsule Take 1 capsule by mouth daily 30 capsule 3    furosemide (LASIX) 20 MG tablet Take 1 tablet by mouth daily 30 tablet 5    diclofenac (VOLTAREN) 75 MG EC tablet Take 1 tablet by mouth 2 times daily 60 tablet 3     No current facility-administered medications on file prior to encounter. REVIEW OF SYSTEMS    Pertinent items are noted in HPI. Objective: There were no vitals taken for this visit.     Wt Readings from Last 3 Encounters:   12/27/18 (!) 486 lb (220.4 kg)   12/13/18 (!) 482 Post-Procedure Length (cm) 3.5 cm 12/13/2018  1:10 PM   Post-Procedure Width (cm) 1.7 cm 12/13/2018  1:10 PM   Post-Procedure Depth (cm) 0.2 cm 12/13/2018  1:10 PM   Post-Procedure Surface Area (cm^2) 5.95 cm^2 12/13/2018  1:10 PM   Post-Procedure Volume (cm^3) 1.19 cm^3 12/13/2018  1:10 PM   Drainage Amount Moderate 12/27/2018  2:08 PM   Drainage Description Serosanguinous 12/27/2018  2:08 PM   Odor None 12/27/2018  2:08 PM   Margins Defined edges 12/27/2018  2:08 PM   Yue-wound Assessment Dry; Intact 12/27/2018  2:08 PM   Non-staged Wound Description Full thickness 12/27/2018  2:08 PM   Sublimity%Wound Bed 40 11/30/2018 10:26 AM   Red%Wound Bed 100 12/27/2018  2:08 PM   Yellow%Wound Bed 40 12/13/2018  1:10 PM   Number of days: 217            Assessment:      Patient Active Problem List   Diagnosis Code    Osteoarthritis M19.90    Sleep apnea G47.30    Finger infection L08.9    Spondylarthrosis M47.9    Thoracolumbar back pain M54.5, M54.6    Right knee pain M25.561    Lateral epicondylitis, R M77.10    Pleuritis R09.1    Atypical chest pain R07.89    Tobacco abuse Z72.0    ETD (eustachian tube dysfunction) H69.80    Left otitis media H66.92    Low back pain M54.5    Leg wound, right S81.801A    Abrasion T14. 8XXA    Leg wound, right S81.801A    Edema R60.9    Weight loss R63.4    Hemorrhoids K64.9    Unstable knee M25.369    Cold sore B00.1    Anal fissure K60.2    Other and unspecified hyperlipidemia E78.5    Abnormal EKG R94.31    Hyperglycemia R73.9    Synovitis and tenosynovitis, unspecified M65.9    Class 3 obesity with serious comorbidity and body mass index (BMI) of 60.0 to 69.9 in adult [id number]    Tingling in extremities, LUE R20.2    Non-pressure chronic ulcer of other part of right lower leg with fat layer exposed ([address] L97.812    Venous insufficiency (chronic) (peripheral) I87.2        Procedure Note  Indications:  Based on my examination of this patient's wound(s)/ulcer(s)

## 2018-12-27 NOTE — PROGRESS NOTES
Subjective:      Patient ID: Keisha Winn is a 48 y.o. male who presents today for:  Chief Complaint   Patient presents with    Sleep Apnea     f/u on sleep apnea. He is in need of a face to face for his supplies. He states he has had relief with current cpap treatments x almost 20 years. He is requesting a new mask and possible new machine as he has had his current machine x 10 years.  Anxiety     increased anxiety and frustration issues, especially at work x 1 year. He would like to discuss treatment options. Patient has taken Wellbutrin many years ago.  Lesion(s)     lesion on right side of back that wife noticed last night. He states he is unable to reach the area and wife did apply first aid cream. He does admit to itching in the area. HPI    Sleep Apnea  Pt is here today for a follow-up on chronic sleep apnea. Today is his face to face appointment for CPAP supplies. He benefits from using his CPAP machine & has had relief for close to 20 years. He uses his CPAP machine nightly. He is requesting a new CPAP mask & possibly even a new machine as he has had his current one X 10 years. Anxiety  Pt admits to increased anxiety & frustration, especially at work X 1 year. He feels like the closer he gets to jail the worse it's getting at work. States that he has 1.5 years left at his job. He wants to discuss treatment options. He has taken Wellbutrin in the past but it was many years ago. Lesion  Pt would like to have a lesion on his upper right back looked at that his Wife noticed last night. He is unable to reach or see the area. He admits to itchiness in the area.       Past Medical History:   Diagnosis Date    Abnormal EKG 9/7/2016    Abrasion 8/21/2014    Atypical chest pain 2/18/2013    Class 3 obesity with serious comorbidity and body mass index (BMI) of 60.0 to 69.9 in adult 1/23/2018    Cold sore 8/3/2015    Corneal abrasion     Edema 8/21/2014    Edema leg     lower leg    ETD (eustachian tube dysfunction) 4/17/2013    Hemorrhoids 1/9/2015    Hyperglycemia 9/7/2016    Knee pain, right     Lateral epicondylitis, R 2/18/2013    LBP (low back pain) 4/22/2014    Left otitis media 4/17/2013    Leg length discrepancy     Leg wound, right 8/21/2014    Osteoarthritis     Other and unspecified hyperlipidemia 8/15/2016    Pes planus     Pleuritis 2/18/2013    Right knee pain 8/15/2012    Rotator cuff tendinitis     Spondylarthrosis 5/7/2012    Thoracolumbar back pain 5/7/2012    Thoracolumbar back pain 5/7/2012    Tingling in extremities, LUE 2/2/2018    Tobacco abuse 2/18/2013    Tobacco abuse     Unspecified sleep apnea     Unstable knee 7/8/2015    Varicosities     Weight loss 12/11/2014     Past Surgical History:   Procedure Laterality Date    COLONOSCOPY  9/25/14    POLYPECTOMY JARMOSZUK    FASCIOTOMY Right 2006    triple fasciotomy right leg    TONSILLECTOMY       Family History   Problem Relation Age of Onset    Diabetes Mother     Kidney Disease Mother     Cancer Mother         Lung    Cancer Father         Pancreatic    High Blood Pressure Father     Diabetes Other     Cancer Other      Social History     Social History    Marital status:      Spouse name: N/A    Number of children: N/A    Years of education: N/A     Occupational History    Not on file. Social History Main Topics    Smoking status: Former Smoker     Quit date: 2/1/2014    Smokeless tobacco: Never Used    Alcohol use Yes    Drug use: Unknown    Sexual activity: Not on file     Other Topics Concern    Not on file     Social History Narrative    No narrative on file     Allergies:  Patient has no known allergies. Review of Systems   Constitutional: Negative for activity change, appetite change, chills, diaphoresis and fever. HENT: Negative for congestion, ear discharge, ear pain, facial swelling, hearing loss and mouth sores.     Eyes: Negative for is alert and oriented to person, place, and time. Skin: Skin is warm and dry. Rash noted. Rash is macular. Psychiatric: He has a normal mood and affect. His behavior is normal. Judgment and thought content normal.       Assessment:      Diagnosis Orders   1. Obstructive sleep apnea syndrome  DME Order for CPAP as OP   2. Ringworm  econazole nitrate 1 % cream   3. Class 3 severe obesity due to excess calories with serious comorbidity and body mass index (BMI) greater than or equal to 70 in adult ([facility] [address]     4. Weight gain     5. Venous insufficiency (chronic) (peripheral)     6. Thoracolumbar back pain  HYDROcodone-acetaminophen (NORCO) 7.5-325 MG per tablet   7. Spondylosis of lumbar region without myelopathy or radiculopathy  HYDROcodone-acetaminophen (NORCO) 7.5-325 MG per tablet   8. Primary osteoarthritis involving multiple joints  HYDROcodone-acetaminophen (NORCO) 7.5-325 MG per tablet   9. Anxiety  sertraline (ZOLOFT) 50 MG tablet   10. Tinea corporis         Plan:      Orders Placed This Encounter   Procedures    DME Order for CPAP as OP     Face to Face Discussion Documenting need for CPAP with qualifying diagnosis     Orders Placed This Encounter   Medications    econazole nitrate 1 % cream     Sig: Apply topically 2 times daily     Dispense:  1 Tube     Refill:  3    sertraline (ZOLOFT) 50 MG tablet     Sig: Take 1 tablet by mouth daily     Dispense:  30 tablet     Refill:  11    HYDROcodone-acetaminophen (NORCO) 7.5-325 MG per tablet     Sig: Take 1 tablet by mouth every 6 hours as needed for Pain for up to 90 days. Zackary Jonathan Date: 12/27/18     Dispense:  60 tablet     Refill:  0       Controlled Substances Monitoring:Attestation: The Prescription Monitoring Report for this patient was reviewed today. Josiane Villar DO)  Documentation: No signs of potential drug abuse or diversion identified. (Josiane Villar DO)    No Follow-up on file.     Alisson DE LA CRUZ RMA  , am scribing for and in the presence of Jacobo Vickers DO. Electronically signed by :OSCAR Brock      I, Jacobo Vickers DO, personally performed the services described in this documentation, as scribed by OSCAR Brock   in my presence, and it is both accurate and complete.  Electronically signed by: Jacobo Vickers DO    12/27/18 11:03 AM    Jacobo Vickers DO

## 2018-12-27 NOTE — CODE DOCUMENTATION
[id number] [facility] Physician Billing Sheet. Moustapha Pitt  AGE: 48 y.o.    GENDER: male  : 1965  TODAY'S DATE:  2018    ICD-10  [facility] Street Problems    Diagnosis Date Noted    Non-pressure chronic ulcer of other part of right lower leg with fat layer exposed ([facility] [address] [L97.812] 2018    Venous insufficiency (chronic) (peripheral) [I87.2] 2018    Edema [R60.9] 2014       PHYSICIAN PROCEDURES    CPT CODE  47627      Electronically signed by Audrey Martins DPM on 2018 at 2:41 PM

## 2018-12-28 DIAGNOSIS — L97.812 NON-PRESSURE CHRONIC ULCER OF OTHER PART OF RIGHT LOWER LEG WITH FAT LAYER EXPOSED (HCC): ICD-10-CM

## 2018-12-28 DIAGNOSIS — L40.9 PSORIASIS: ICD-10-CM

## 2018-12-28 LAB
RHEUMATOID FACTOR: <10 IU/ML (ref 0–14)
SEDIMENTATION RATE, ERYTHROCYTE: 8 MM (ref 0–20)

## 2018-12-31 LAB — ANA IGG, ELISA: NORMAL

## 2019-01-10 ENCOUNTER — HOSPITAL ENCOUNTER (OUTPATIENT)
Dept: WOUND CARE | Age: 54
Discharge: HOME | End: 2019-01-10
Payer: COMMERCIAL

## 2019-01-10 VITALS
RESPIRATION RATE: 18 BRPM | SYSTOLIC BLOOD PRESSURE: 143 MMHG | BODY MASS INDEX: 46.65 KG/M2 | DIASTOLIC BLOOD PRESSURE: 65 MMHG | TEMPERATURE: 97.6 F | WEIGHT: 315 LBS | HEART RATE: 74 BPM | HEIGHT: 69 IN

## 2019-01-10 PROCEDURE — 99213 OFFICE O/P EST LOW 20 MIN: CPT

## 2019-01-10 RX ORDER — TRIAMCINOLONE ACETONIDE 0.25 MG/G
OINTMENT TOPICAL
Qty: 80 G | Refills: 2 | Status: SHIPPED | OUTPATIENT
Start: 2019-01-10 | End: 2019-01-17

## 2019-02-12 ENCOUNTER — HOSPITAL ENCOUNTER (OUTPATIENT)
Dept: WOUND CARE | Age: 54
Discharge: HOME | End: 2019-02-12
Payer: COMMERCIAL

## 2019-02-12 VITALS
SYSTOLIC BLOOD PRESSURE: 142 MMHG | TEMPERATURE: 97.6 F | DIASTOLIC BLOOD PRESSURE: 73 MMHG | HEART RATE: 79 BPM | WEIGHT: 315 LBS | RESPIRATION RATE: 18 BRPM | HEIGHT: 69 IN | BODY MASS INDEX: 46.65 KG/M2

## 2019-02-12 PROCEDURE — 87205 SMEAR GRAM STAIN: CPT

## 2019-02-12 PROCEDURE — 87077 CULTURE AEROBIC IDENTIFY: CPT

## 2019-02-12 PROCEDURE — 87147 CULTURE TYPE IMMUNOLOGIC: CPT

## 2019-02-12 PROCEDURE — 87186 SC STD MICRODIL/AGAR DIL: CPT

## 2019-02-12 PROCEDURE — 87070 CULTURE OTHR SPECIMN AEROBIC: CPT

## 2019-02-12 PROCEDURE — 99213 OFFICE O/P EST LOW 20 MIN: CPT

## 2019-02-15 ENCOUNTER — TELEPHONE (OUTPATIENT)
Dept: WOUND CARE | Age: 54
End: 2019-02-15

## 2019-02-15 RX ORDER — CIPROFLOXACIN 500 MG/1
500 TABLET, FILM COATED ORAL 2 TIMES DAILY
Qty: 20 TABLET | Refills: 0 | Status: SHIPPED | OUTPATIENT
Start: 2019-02-15 | End: 2019-02-25

## 2019-02-16 LAB
GRAM STAIN RESULT: ABNORMAL
ORGANISM: ABNORMAL
ORGANISM: ABNORMAL
WOUND/ABSCESS: ABNORMAL

## 2019-03-01 ENCOUNTER — HOSPITAL ENCOUNTER (OUTPATIENT)
Dept: WOUND CARE | Age: 54
Discharge: HOME | End: 2019-03-01
Payer: COMMERCIAL

## 2019-03-01 VITALS
RESPIRATION RATE: 18 BRPM | TEMPERATURE: 97.8 F | HEART RATE: 75 BPM | SYSTOLIC BLOOD PRESSURE: 171 MMHG | DIASTOLIC BLOOD PRESSURE: 94 MMHG

## 2019-03-01 PROCEDURE — 11042 DBRDMT SUBQ TIS 1ST 20SQCM/<: CPT

## 2019-03-14 ENCOUNTER — TELEPHONE (OUTPATIENT)
Dept: PRIMARY CARE CLINIC | Age: 54
End: 2019-03-14

## 2019-03-26 ENCOUNTER — HOSPITAL ENCOUNTER (OUTPATIENT)
Dept: WOUND CARE | Age: 54
Discharge: HOME | End: 2019-03-26
Payer: COMMERCIAL

## 2019-03-26 VITALS
TEMPERATURE: 97.3 F | WEIGHT: 315 LBS | RESPIRATION RATE: 18 BRPM | HEIGHT: 69 IN | HEART RATE: 79 BPM | SYSTOLIC BLOOD PRESSURE: 141 MMHG | BODY MASS INDEX: 46.65 KG/M2 | DIASTOLIC BLOOD PRESSURE: 69 MMHG

## 2019-03-26 PROCEDURE — 87147 CULTURE TYPE IMMUNOLOGIC: CPT

## 2019-03-26 PROCEDURE — 99213 OFFICE O/P EST LOW 20 MIN: CPT

## 2019-03-26 PROCEDURE — 87070 CULTURE OTHR SPECIMN AEROBIC: CPT

## 2019-03-26 PROCEDURE — 87205 SMEAR GRAM STAIN: CPT

## 2019-03-26 PROCEDURE — 87186 SC STD MICRODIL/AGAR DIL: CPT

## 2019-03-26 PROCEDURE — 87077 CULTURE AEROBIC IDENTIFY: CPT

## 2019-03-28 LAB
GRAM STAIN RESULT: ABNORMAL
ORGANISM: ABNORMAL
ORGANISM: ABNORMAL
WOUND/ABSCESS: ABNORMAL

## 2019-03-29 ENCOUNTER — TELEPHONE (OUTPATIENT)
Dept: WOUND CARE | Age: 54
End: 2019-03-29

## 2019-03-29 RX ORDER — GENTAMICIN SULFATE 1 MG/G
OINTMENT TOPICAL
Qty: 30 G | Refills: 2 | Status: SHIPPED | OUTPATIENT
Start: 2019-03-29 | End: 2019-04-05

## 2019-04-02 ENCOUNTER — TELEPHONE (OUTPATIENT)
Dept: WOUND CARE | Age: 54
End: 2019-04-02

## 2019-04-02 NOTE — TELEPHONE ENCOUNTER
This RN spoke with patient via telephone to inform him of a 10% co insurance he will be responsible for regarding the skin substitute Apligraf if applied. Patient states \"go ahead and order it. \"  When asked if he picked up his gentamicin ointment, patient states \"I'm going to pick it up today. \"

## 2019-04-19 ENCOUNTER — HOSPITAL ENCOUNTER (OUTPATIENT)
Dept: WOUND CARE | Age: 54
Discharge: HOME | End: 2019-04-19
Payer: COMMERCIAL

## 2019-04-19 VITALS
HEART RATE: 84 BPM | RESPIRATION RATE: 20 BRPM | DIASTOLIC BLOOD PRESSURE: 71 MMHG | TEMPERATURE: 98 F | SYSTOLIC BLOOD PRESSURE: 126 MMHG

## 2019-04-19 PROCEDURE — 15271 SKIN SUB GRAFT TRNK/ARM/LEG: CPT

## 2019-04-19 RX ORDER — HYDROCODONE BITARTRATE AND ACETAMINOPHEN 5; 325 MG/1; MG/1
1 TABLET ORAL EVERY 6 HOURS PRN
COMMUNITY
End: 2021-08-20 | Stop reason: SDUPTHER

## 2019-04-19 NOTE — CODE DOCUMENTATION
[facility] Physician Billing Sheet. Skylar Payment  AGE: 48 y.o.    GENDER: male  : 1965  TODAY'S DATE:  2019    ICD-10  [facility] [address] Problems    Diagnosis Date Noted    Non-pressure chronic ulcer of other part of right lower leg with fat layer exposed ([facility] [address] [L97.812] 2018    Venous insufficiency (chronic) (peripheral) [I87.2] 2018    Edema [R60.9] 2014    Leg wound, right [S81.801A] 2014       PHYSICIAN PROCEDURES    CPT CODE  48223 RT      Electronically signed by Te Parekh DPM on 2019 at 9:25 AM

## 2019-04-19 NOTE — PROGRESS NOTES
Adrianna Garcia 39                                                   Progress Note and Procedure Note      [address] RECORD NUMBER:  [id number]  AGE: 48 y.o. GENDER: male  : 1965  EPISODE DATE:  2019    Subjective:     Chief Complaint   Patient presents with    Wound Check     right leg wounds recheck         HISTORY of PRESENT ILLNESS HPI     Paul Mccabe is a 48 y.o. male who presents today for wound/ulcer evaluation. History of Wound Context: Non-healing full thickness venous stasis ulcer of the right calf. He is here today for apligraf application.   Wound/Ulcer Pain Timing/Severity: intermittent  Quality of pain: aching  Severity:  3 / 10   Modifying Factors: Pain worsens with walking  Associated Signs/Symptoms: edema and drainage    Ulcer Identification:  Ulcer Type: venous  Contributing Factors: edema, venous stasis, lymphedema and obesity    Wound: N/A        PAST MEDICAL HISTORY        Diagnosis Date    Abnormal EKG 2016    Abrasion 2014    Atypical chest pain 2013    Class 3 obesity with serious comorbidity and body mass index (BMI) of 60.0 to 69.9 in adult 2018    Cold sore 8/3/2015    Corneal abrasion     Edema 2014    Edema leg     lower leg    ETD (eustachian tube dysfunction) 2013    Hemorrhoids 2015    Hyperglycemia 2016    Knee pain, right     Lateral epicondylitis, R 2013    LBP (low back pain) 2014    Left otitis media 2013    Leg length discrepancy     Leg wound, right 2014    Osteoarthritis     Other and unspecified hyperlipidemia 8/15/2016    Pes planus     Pleuritis 2013    Right knee pain 8/15/2012    Rotator cuff tendinitis     Spondylarthrosis 2012    Thoracolumbar back pain 2012    Thoracolumbar back pain 2012    Tingling in extremities, LUE 2018    Tobacco abuse 2013    Tobacco abuse     Unspecified sleep apnea     Unstable knee 2015  Varicosities     Weight loss 2014       PAST SURGICAL HISTORY    Past Surgical History:   Procedure Laterality Date    COLONOSCOPY  14    POLYPECTOMY Wilfred Aschoff    FASCIOTOMY Right 2006    triple fasciotomy right leg    TONSILLECTOMY         FAMILY HISTORY    Family History   Problem Relation Age of Onset    Diabetes Mother     Kidney Disease Mother     Cancer Mother         Lung    Cancer Father         Pancreatic    High Blood Pressure Father     Diabetes Other     Cancer Other        SOCIAL HISTORY    Social History     Tobacco Use    Smoking status: Former Smoker     Last attempt to quit: 2014     Years since quittin.2    Smokeless tobacco: Never Used   Substance Use Topics    Alcohol use: Yes    Drug use: Not on file       ALLERGIES    No Known Allergies    MEDICATIONS    Current Outpatient Medications on File Prior to Encounter   Medication Sig Dispense Refill    HYDROcodone-acetaminophen (NORCO) 5-325 MG per tablet Take 1 tablet by mouth every 6 hours as needed for Pain.  econazole nitrate 1 % cream Apply topically 2 times daily 1 Tube 3    sertraline (ZOLOFT) 50 MG tablet Take 1 tablet by mouth daily 30 tablet 11    albuterol sulfate HFA (PROAIR HFA) 108 (90 Base) MCG/ACT inhaler Inhale 2 puffs into the lungs every 6 hours as needed for Wheezing 1 Inhaler 3    lidocaine (LIDODERM) 5 % Place 1 patch onto the skin daily 12 hours on, 12 hours off. 30 patch 3    triamterene-hydrochlorothiazide (DYAZIDE) 37.5-25 MG per capsule Take 1 capsule by mouth daily 30 capsule 3    furosemide (LASIX) 20 MG tablet Take 1 tablet by mouth daily 30 tablet 5    diclofenac (VOLTAREN) 75 MG EC tablet Take 1 tablet by mouth 2 times daily 60 tablet 3     No current facility-administered medications on file prior to encounter. REVIEW OF SYSTEMS    Pertinent items are noted in HPI.     Objective:      /71   Pulse 84   Temp 98 °F (36.7 °C) (Tympanic)   Resp 20     Wt Readings from Last 3 Encounters:   03/26/19 (!) 486 lb (220.4 kg)   02/12/19 (!) 486 lb (220.4 kg)   01/10/19 (!) 486 lb (220.4 kg)       PHYSICAL EXAM    Constitutional:   Well nourished and well developed. Appears neat and clean. Patient is alert, oriented x3, and in no apparent distress. Respiratory:  Respiratory effort is easy and symmetric bilaterally. Rate is normal at rest and on room air. Vascular:  Pedal Pulses is palpable and audible with doppler. Capillary refill is <3 sec to digits bilateral.  Extremities negative for pitting edema. Neurological:   Sensation is intact to lower extremities. Dermatological:  Wound description noted in wound assessment. The wound(s) are healthy and granular and are ready for  Apligraf application. Psychiatric:  Judgement and insight intact. Short and long term memory intact. No evidence of depression, anxiety, or agitation. Patient is calm, cooperative, and communicative. Appropriate interactions and affect. Assessment:      Patient Active Problem List   Diagnosis Code    Osteoarthritis M19.90    Sleep apnea G47.30    Finger infection L08.9    Spondylarthrosis M47.9    Thoracolumbar back pain M54.5, M54.6    Right knee pain M25.561    Lateral epicondylitis, R M77.10    Pleuritis R09.1    Atypical chest pain R07.89    Tobacco abuse Z72.0    ETD (eustachian tube dysfunction) H69.80    Left otitis media H66.92    Low back pain M54.5    Leg wound, right S81.801A    Abrasion T14. 8XXA    Leg wound, right S81.801A    Edema R60.9    Weight loss R63.4    Hemorrhoids K64.9    Unstable knee M25.369    Cold sore B00.1    Anal fissure K60.2    Other and unspecified hyperlipidemia E78.5    Abnormal EKG R94.31    Hyperglycemia R73.9    Synovitis and tenosynovitis, unspecified M65.9    Class 3 obesity with serious comorbidity and body mass index (BMI) of 60.0 to 69.9 in adult [id number]    Tingling in extremities, LUE R20.2    Non-pressure chronic ulcer of other part of right lower leg with fat layer exposed ([facility] [address] L97.812    Venous insufficiency (chronic) (peripheral) I87.2        Procedure Note  Indications:  Based on my examination of this patient's wound(s)/ulcer(s) today, debridement is required to prepare the wound bed today for application of a skin substitute. Performed by: Jerrell Meier DPM    Consent obtained:  Yes    Time out taken:  Yes    Pain Control: Anesthetic  Anesthetic: None       Debridement:Excisional Debridement    Using curette the wound(s)/ulcer(s) was/were sharply debrided down through and including the removal of epidermis, dermis and subcutaneous tissue. Devitalized Tissue Debrided:  slough    Pre Debridement Measurements:  Are located in the [place]  Documentation Flow Sheet    Wound/Ulcer #: 1    Post Debridement Measurements:  Wound/Ulcer Descriptions are Pre Debridement except measurements:    Wound 05/24/18 Leg Right; Lower; Lateral #1 (Active)   Wound Image   4/19/2019  8:53 AM   Wound Venous 4/19/2019  8:53 AM   Dressing/Treatment Collagen with Ag;Dry dressing 1/10/2019  2:36 PM   Wound Cleansed Rinsed/Irrigated with saline 4/19/2019  8:53 AM   Wound Length (cm) 2.2 cm 4/19/2019  8:53 AM   Wound Width (cm) 1.2 cm 4/19/2019  8:53 AM   Wound Depth (cm) 0.2 cm 4/19/2019  8:53 AM   Wound Surface Area (cm^2) 2.64 cm^2 4/19/2019  8:53 AM   Change in Wound Size % (l*w) 16.19 4/19/2019  8:53 AM   Wound Volume (cm^3) 0.53 cm^3 4/19/2019  8:53 AM   Wound Healing % 16 4/19/2019  8:53 AM   Post-Procedure Length (cm) 2.7 cm 3/1/2019 10:17 AM   Post-Procedure Width (cm) 1 cm 3/1/2019 10:17 AM   Post-Procedure Depth (cm) 0.2 cm 3/1/2019 10:17 AM   Post-Procedure Surface Area (cm^2) 2.7 cm^2 3/1/2019 10:17 AM   Post-Procedure Volume (cm^3) 0.54 cm^3 3/1/2019 10:17 AM   Drainage Amount Small 4/19/2019  8:53 AM   Drainage Description Serosanguinous 4/19/2019  8:53 AM   Odor None 4/19/2019  8:53 AM   Margins Defined edges 4/19/2019  8:53 AM   Yue-wound Assessment Dry; Intact 4/19/2019  8:53 AM   Non-staged Wound Description Full thickness 4/19/2019  8:53 AM   [place]%Wound Bed 80 2/12/2019  1:52 PM   Red%Wound Bed 90 4/19/2019  8:53 AM   Yellow%Wound Bed 10 4/19/2019  8:53 AM   Other%Wound Bed 5% white 3/1/2019  9:55 AM   Number of days: 330       Percent of Wound/Ulcer Debrided: 100%    Total Surface Area Debrided:  2.7 sq cm     Diabetic/Pressure/Non Pressure Ulcers:  Ulcer:     Non-Pressure ulcer, fat layer exposed    Bleeding:  Minimal    Hemostasis Achieved:  by pressure    Procedural Pain:  2 / 10     Post Procedural Pain:  1 / 10     Response to treatment:  Well tolerated by patient. Skin Substitue Procedure Note  Procedure:  Skin Substitute Application    Performed by: Zia Fontenot DPM    Ulcer Type:venous    Consent obtained: Yes    Time out taken: Yes    Product Utilized:   Application #     [id number] 87 sq/cm     Product Lot #:  [id number]  Product Expiration Date: 4-23-19    0.9% Normal Saline: Lot # [id number]  0.9% Normal Saline: Expiration Date: 7/20          Fenestrated: Yes    Mesher Utilized:  No    Skin Substitute was Applied to Ulcer Number(s):    Ulcer #: 1      Wound 05/24/18 Leg Right; Lower; Lateral #1 (Active)   Wound Image   4/19/2019  8:53 AM   Wound Venous 4/19/2019  8:53 AM   Dressing/Treatment Collagen with Ag;Dry dressing 1/10/2019  2:36 PM   Wound Cleansed Rinsed/Irrigated with saline 4/19/2019  8:53 AM   Wound Length (cm) 2.2 cm 4/19/2019  8:53 AM   Wound Width (cm) 1.2 cm 4/19/2019  8:53 AM   Wound Depth (cm) 0.2 cm 4/19/2019  8:53 AM   Wound Surface Area (cm^2) 2.64 cm^2 4/19/2019  8:53 AM   Change in Wound Size % (l*w) 16.19 4/19/2019  8:53 AM   Wound Volume (cm^3) 0.53 cm^3 4/19/2019  8:53 AM   Wound Healing % 16 4/19/2019  8:53 AM   Post-Procedure Length (cm) 2.7 cm 3/1/2019 10:17 AM   Post-Procedure Width (cm) 1 cm 3/1/2019 10:17 AM   Post-Procedure Depth (cm) 0.2 cm 3/1/2019 10:17 AM   Post-Procedure Surface Area (cm^2) 2.7 cm^2 3/1/2019 10:17 AM   Post-Procedure Volume (cm^3) 0.54 cm^3 3/1/2019 10:17 AM   Drainage Amount Small 4/19/2019  8:53 AM   Drainage Description Serosanguinous 4/19/2019  8:53 AM   Odor None 4/19/2019  8:53 AM   Margins Defined edges 4/19/2019  8:53 AM   Yue-wound Assessment Dry; Intact 4/19/2019  8:53 AM   Non-staged Wound Description Full thickness 4/19/2019  8:53 AM   [place]%Wound Bed 80 2/12/2019  1:52 PM   Red%Wound Bed 90 4/19/2019  8:53 AM   Yellow%Wound Bed 10 4/19/2019  8:53 AM   Other%Wound Bed 5% white 3/1/2019  9:55 AM   Number of days: 330          Total Surface Area of Ulcer(s) Covered 2.7 sq/cm    Was the Product Layered  Yes     Amount of Product Applied 5 sq/cm     Amount of Product Wasted 39 sq/cm     Reason for Waste Wound Size smaller then product size      Surgically Fixated: Yes    Secured With: Steri Strips and Mepitel     Procedural Pain: 0/10     Post Procedural Pain: 0 / 10    Response to Treatment:  Well tolerated by patient. Problem List Items Addressed This Visit     None              Plan:     Keep dressing clean and dry for one week. Follow up in one week. May return to work on Monday 4/22/19        Treatment Note please see attached Discharge Instructions    Written patient dismissal instructions given to patient and signed by patient or POA. Discharge Instructions       Home Care: NONE     Wound Location:  Right Lateral Leg     Dressing orders:    Apligraf and dressings applied in wound center per Dr. David Mahan, [address].      Compression:  Apply med (10-20) compression hose to right lower legs, may remove at bedtime, reapply first thing in the morning, avoid prolonged standing, elevate legs when sitting     Other Instructions:    apply Vicks Vapo Rub to toenails and heels daily before bed.     Keep all dressings clean & dry.  Keep pressure off the wound(s) at all times. Do not shower, take baths or get wound wet, unless otherwise instructed by your Wound Care doctor.     Follow up visit   1weeks  APRIL 26, 2019 AT     For Diabetic patients keep blood sugars below 150 for optimal wound healing.     If you experience any of the following, please call the Wound Care Service during business hours: [phone]   *Increase in pain   *Temperature over 101   *Increase in drainage from your wound or a foul odor   *Uncontrolled swelling   *Need for compression bandage changes due to slippage, breakthrough drainage     If you need medical attention outside of business hours, please contact your Primary Care Doctor or go to the nearest emergency room. Keep next scheduled appointment. Dora Hayden give 24 hour notice if unable to keep appointment. [phone]     PLEASE NOTE: IF YOU ARE UNABLE TO OBTAIN WOUND SUPPLIES, CONTINUE TO USE THE SUPPLIES YOU HAVE AVAILABLE UNTIL YOU ARE ABLE TO REACH US.  IT IS MOST IMPORTANT TO KEEP THE WOUND COVERED AT ALL TIMES  Electronically signed by Mary Meadows DPM on 4/19/2019 at 9:23 AM            Electronically signed by Mary Meadows DPM on 4/19/2019 at 12:54 PM

## 2019-04-19 NOTE — FLOWSHEET NOTE
Apligraf Treatment Note    NAME:  Gricel Alvarado  YOB: 1965  MEDICAL RECORD NUMBER:  [id number]  DATE:  4/19/2019    Goal: Patient will receive safe and proper application of skin substitute. Patient will comply with caring for dressing, offloading and reporting complications. Expiration date and pH of Apligraf checked immediately prior to use. Package intact prior to use and no damage noted. Transport temperature controlled and acceptable. Apligraf was removed from protective sterile packaging by physician and applied to prepared wound bed. Apligraf was applied to RIGHT LOWER LEG and affixed with steri-strips by the provider. Apligraf was covered with non-adherent ulcer dressing. Applied CALCIUM ALGINATE WITH SILVER over non-adherent. Applied dry gauze and/or roll gauze. Coban or ace wrap was applied to secure graft and decrease edema. Patient/caregiver was instructed not to remove dressing and to keep it clean and dry. Pt/family/caregiver was instructed on signs and symptoms of complications to report such as draining through dressing, dressing falling down/slipping, getting wet, or severe pain or tingling in toes   Pt/family/caregiver was instructed on need for offloading and elevation of affected extremity and on use of prescribed offloading device. Apligraf may be applied a total of 5 times per wound over a 12 week period. Date of first application of Apligraf for this current wound is October 18, 2018.        Guidelines followed    Electronically signed by Mikayla Del Toro RN on 4/19/2019 at 1:03 PM

## 2019-04-19 NOTE — LETTER
[address] [facility] [address]  Phone: [phone]    [place], [place]        April 19, 2019     Patient: Francisco Cantor   YOB: 1965   Date of Visit: 4/19/2019       To Whom It May Concern: It is my medical opinion that Stacy Mijares should remain out of work until Monday April 22, 2019 due to procedure on right leg. If you have any questions or concerns, please don't hesitate to call.     Sincerely,        Murtaza Figueroa DPM

## 2019-04-26 ENCOUNTER — HOSPITAL ENCOUNTER (OUTPATIENT)
Dept: WOUND CARE | Age: 54
Discharge: HOME | End: 2019-04-26
Payer: COMMERCIAL

## 2019-04-26 VITALS
DIASTOLIC BLOOD PRESSURE: 99 MMHG | SYSTOLIC BLOOD PRESSURE: 160 MMHG | HEART RATE: 77 BPM | RESPIRATION RATE: 24 BRPM | TEMPERATURE: 98.3 F

## 2019-04-26 PROCEDURE — 99213 OFFICE O/P EST LOW 20 MIN: CPT

## 2019-04-26 NOTE — PROGRESS NOTES
Adrianna Garcia 37   Progress Note and Procedure Note      [address] RECORD NUMBER:  [id number]  AGE: 48 y.o. GENDER: male  : 1965  EPISODE DATE:  2019    Subjective:     Chief Complaint   Patient presents with    Wound Check     right lower lateral leg         HISTORY of PRESENT ILLNESS HPI     Harvey Houston is a 48 y.o. male who presents today for wound/ulcer evaluation. History of Wound Context: s/p 1 week apligraf application. Right calf  wound is granular and healthy with minimal drainage. No signs of infection.  Denies any N/V/F/C  Wound/Ulcer Pain Timing/Severity: constant  Quality of pain: throbbing  Severity:  4 / 10   Modifying Factors: Pain worsens with walking  Associated Signs/Symptoms: edema    Ulcer Identification:  Ulcer Type: venous  Contributing Factors: venous stasis, lymphedema and obesity    Wound: N/A        PAST MEDICAL HISTORY        Diagnosis Date    Abnormal EKG 2016    Abrasion 2014    Atypical chest pain 2013    Class 3 obesity with serious comorbidity and body mass index (BMI) of 60.0 to 69.9 in adult 2018    Cold sore 8/3/2015    Corneal abrasion     Edema 2014    Edema leg     lower leg    ETD (eustachian tube dysfunction) 2013    Hemorrhoids 2015    Hyperglycemia 2016    Knee pain, right     Lateral epicondylitis, R 2013    LBP (low back pain) 2014    Left otitis media 2013    Leg length discrepancy     Leg wound, right 2014    Osteoarthritis     Other and unspecified hyperlipidemia 8/15/2016    Pes planus     Pleuritis 2013    Right knee pain 8/15/2012    Rotator cuff tendinitis     Spondylarthrosis 2012    Thoracolumbar back pain 2012    Thoracolumbar back pain 2012    Tingling in extremities, LUE 2018    Tobacco abuse 2013    Tobacco abuse     Unspecified sleep apnea     Unstable knee 2015    Varicosities     Weight loss 2014       PAST SURGICAL HISTORY    Past Surgical History:   Procedure Laterality Date    COLONOSCOPY  14    POLYPECTOMY Asael Staleyky    FASCIOTOMY Right 2006    triple fasciotomy right leg    TONSILLECTOMY         FAMILY HISTORY    Family History   Problem Relation Age of Onset    Diabetes Mother     Kidney Disease Mother     Cancer Mother         Lung    Cancer Father         Pancreatic    High Blood Pressure Father     Diabetes Other     Cancer Other        SOCIAL HISTORY    Social History     Tobacco Use    Smoking status: Former Smoker     Last attempt to quit: 2014     Years since quittin.2    Smokeless tobacco: Never Used   Substance Use Topics    Alcohol use: Yes    Drug use: Not on file       ALLERGIES    No Known Allergies    MEDICATIONS    Current Outpatient Medications on File Prior to Encounter   Medication Sig Dispense Refill    HYDROcodone-acetaminophen (NORCO) 5-325 MG per tablet Take 1 tablet by mouth every 6 hours as needed for Pain.  econazole nitrate 1 % cream Apply topically 2 times daily 1 Tube 3    sertraline (ZOLOFT) 50 MG tablet Take 1 tablet by mouth daily 30 tablet 11    albuterol sulfate HFA (PROAIR HFA) 108 (90 Base) MCG/ACT inhaler Inhale 2 puffs into the lungs every 6 hours as needed for Wheezing 1 Inhaler 3    lidocaine (LIDODERM) 5 % Place 1 patch onto the skin daily 12 hours on, 12 hours off. 30 patch 3    triamterene-hydrochlorothiazide (DYAZIDE) 37.5-25 MG per capsule Take 1 capsule by mouth daily 30 capsule 3    furosemide (LASIX) 20 MG tablet Take 1 tablet by mouth daily 30 tablet 5    diclofenac (VOLTAREN) 75 MG EC tablet Take 1 tablet by mouth 2 times daily 60 tablet 3     No current facility-administered medications on file prior to encounter. REVIEW OF SYSTEMS    Pertinent items are noted in HPI.     Objective:      BP (!) 160/99   Pulse 77   Temp 98.3 °F (36.8 °C) (Temporal)   Resp 24     Wt Readings from Last 3 Encounters: 03/26/19 (!) 486 lb (220.4 kg)   02/12/19 (!) 486 lb (220.4 kg)   01/10/19 (!) 486 lb (220.4 kg)       PHYSICAL EXAM    Constitutional:   Well nourished and well developed. Appears neat and clean. Patient is alert, oriented x3, and in no apparent distress. Respiratory:  Respiratory effort is easy and symmetric bilaterally. Rate is normal at rest and on room air. Vascular:  Pedal Pulses is palpable and audible signal noted with doppler. Capillary refill is <5 sec to digits bilateral.  Extremities negative for pitting edema. Neurological:  Sensation is intact to lower extremities. Dermatological:  Wound description noted in wound assessment. Wound is healthy and granular in appearance, with minimal draiange    Psychiatric:  Judgement and insight intact. Short and long term memory intact. No evidence of depression, anxiety, or agitation. Patient is calm, cooperative, and communicative. Appropriate interactions and affect. Assessment:      Problem List Items Addressed This Visit     None           Procedure Note  Indications:  Based on my examination of this patient's wound(s)/ulcer(s) today, debridement is not required to promote healing and evaluate the wound base. Wound 05/24/18 Leg Right; Lower; Lateral #1 (Active)   Wound Image   4/19/2019  8:53 AM   Wound Venous 4/26/2019  8:53 AM   Dressing/Treatment Collagen with Ag;Dry dressing 1/10/2019  2:36 PM   Wound Cleansed Rinsed/Irrigated with saline 4/19/2019  8:53 AM   Wound Length (cm) 2.7 cm 4/26/2019  9:07 AM   Wound Width (cm) 1 cm 4/26/2019  9:07 AM   Wound Depth (cm) 0.2 cm 4/26/2019  9:07 AM   Wound Surface Area (cm^2) 2.7 cm^2 4/26/2019  9:07 AM   Change in Wound Size % (l*w) 14.29 4/26/2019  9:07 AM   Wound Volume (cm^3) 0.54 cm^3 4/26/2019  9:07 AM   Wound Healing % 14 4/26/2019  9:07 AM   Post-Procedure Length (cm) 2.7 cm 3/1/2019 10:17 AM   Post-Procedure Width (cm) 1 cm 3/1/2019 10:17 AM   Post-Procedure Depth (cm) 0.2 cm 3/1/2019 10:17 AM   Post-Procedure Surface Area (cm^2) 2.7 cm^2 3/1/2019 10:17 AM   Post-Procedure Volume (cm^3) 0.54 cm^3 3/1/2019 10:17 AM   Drainage Amount Small 4/26/2019  8:53 AM   Drainage Description [place];Yellow 4/26/2019  8:53 AM   Odor Mild 4/26/2019  8:53 AM   Margins Defined edges 4/19/2019  8:53 AM   Yue-wound Assessment Dry; Intact 4/26/2019  8:53 AM   Non-staged Wound Description Full thickness 4/26/2019  8:53 AM   [place]%Wound Bed 80 2/12/2019  1:52 PM   Red%Wound Bed 90 4/19/2019  8:53 AM   Yellow%Wound Bed 10 4/19/2019  8:53 AM   Other%Wound Bed 5% white 3/1/2019  9:55 AM   Number of days: 337                  Plan:     Keep dressing clean and dry for one week  Follow up in one week. Treatment Note please see attached Discharge Instructions    Written patient dismissal instructions given to patient and signed by patient or POA. Discharge Instructions       Home Care: NONE     Wound Location:  Right Lateral Leg     Dressing orders:    VERSATEL AND CUTIMED SORBION SACHET BORDER  applied in wound center per Dr. Ry Ramsey, [address].   IF DRESSINGS COME OFF APPLY VERSATEL AND CALCIUM ALGINATE WITH SILVER AND DRY DRESSING.     Compression:  Apply med (10-20) compression hose to right lower legs, may remove at bedtime, reapply first thing in the morning, avoid prolonged standing, elevate legs when sitting     Other Instructions:    apply Vicks Vapo Rub to toenails and heels daily before bed.     Keep all dressings clean & dry.  Keep pressure off the wound(s) at all times. Do not shower, take baths or get wound wet, unless otherwise instructed by your Wound Care doctor.     Follow up visit   1 weeks MAY 3, 2019 AT 9:15AM     For Diabetic patients keep blood sugars below 150 for optimal wound healing.     If you experience any of the following, please call the Wound Care Service during business hours: [phone]   *Increase in pain   *Temperature over 101   *Increase in drainage from your wound or a foul odor   *Uncontrolled swelling   *Need for compression bandage changes due to slippage, breakthrough drainage     If you need medical attention outside of business hours, please contact your Primary Care Doctor or go to the nearest emergency room. Keep next scheduled appointment. Zena Shape give 24 hour notice if unable to keep appointment. [phone]     PLEASE NOTE: IF YOU ARE UNABLE TO OBTAIN WOUND SUPPLIES, CONTINUE TO USE THE SUPPLIES YOU HAVE AVAILABLE UNTIL YOU ARE ABLE TO REACH US.  IT IS MOST IMPORTANT TO KEEP THE WOUND COVERED AT ALL TIMES    Electronically signed by Sallie Yip DPM on 4/26/2019 at 9:13 AM          Electronically signed by Sallie Yip DPM on 4/26/2019 at 9:18 AM

## 2019-04-26 NOTE — CODE DOCUMENTATION
[address] Physician Billing Sheet. Juris Score  AGE: 48 y.o.    GENDER: male  : 1965  TODAY'S DATE:  2019    ICD-10  [facility] [address] Problems    Diagnosis Date Noted    Non-pressure chronic ulcer of other part of right lower leg with fat layer exposed ([facility] [address] [L97.812] 2018    Venous insufficiency (chronic) (peripheral) [I87.2] 2018    Edema [R60.9] 2014    Leg wound, right [S81.801A] 2014       PHYSICIAN PROCEDURES    CPT CODE  47512      Electronically signed by Yun Acharya DPM on 2019 at 9:24 AM

## 2019-05-03 ENCOUNTER — HOSPITAL ENCOUNTER (OUTPATIENT)
Dept: WOUND CARE | Age: 54
Discharge: HOME | End: 2019-05-03
Payer: COMMERCIAL

## 2019-05-03 VITALS
RESPIRATION RATE: 20 BRPM | TEMPERATURE: 98.6 F | HEIGHT: 69 IN | WEIGHT: 315 LBS | DIASTOLIC BLOOD PRESSURE: 90 MMHG | SYSTOLIC BLOOD PRESSURE: 144 MMHG | BODY MASS INDEX: 46.65 KG/M2 | HEART RATE: 94 BPM

## 2019-05-03 PROCEDURE — 99213 OFFICE O/P EST LOW 20 MIN: CPT

## 2019-05-03 NOTE — PROGRESS NOTES
[address] Phys[facility] Billing Sheet. Musa Zepeda  AGE: 48 y.o.    GENDER: male  : 1965  TODAY'S DATE:  5/3/2019    ICD-10 CODES  Active Hospital Problems    Diagnosis Date Noted    Non-pressure chronic ulcer of other part of right lower leg with [facility] layer [facility] [L97.812] 2018    Venous insufficiency (chronic) (peripheral) [I87.2] 2018    Leg wound, right [S81.801A] 2014       PHYSICIAN PROCEDURES    CPT CODE  25251      Electronically signed by Dominick Man DPM on 5/3/2019 at 10:24 AM

## 2019-05-03 NOTE — PROGRESS NOTES
2015    Varicosities     Weight loss 2014       PAST SURGICAL HISTORY    Past Surgical History:   Procedure Laterality Date    COLONOSCOPY  14    POLYPECTOMY Susan Saner    FASCIOTOMY Right 2006    triple fasciotomy right leg    TONSILLECTOMY         FAMILY HISTORY    Family History   Problem Relation Age of Onset    Diabetes Mother     Kidney Disease Mother     Cancer Mother         Lung    Cancer Father         Pancreatic    High Blood Pressure Father     Diabetes Other     Cancer Other        SOCIAL HISTORY    Social History     Tobacco Use    Smoking status: Former Smoker     Last attempt to quit: 2014     Years since quittin.2    Smokeless tobacco: Never Used   Substance Use Topics    Alcohol use: Yes    Drug use: Not on file       ALLERGIES    No Known Allergies    MEDICATIONS    Current Outpatient Medications on File Prior to Encounter   Medication Sig Dispense Refill    HYDROcodone-acetaminophen (NORCO) 5-325 MG per tablet Take 1 tablet by mouth every 6 hours as needed for Pain.  econazole nitrate 1 % cream Apply topically 2 times daily 1 Tube 3    sertraline (ZOLOFT) 50 MG tablet Take 1 tablet by mouth daily 30 tablet 11    albuterol sulfate HFA (PROAIR HFA) 108 (90 Base) MCG/ACT inhaler Inhale 2 puffs into the lungs every 6 hours as needed for Wheezing 1 Inhaler 3    lidocaine (LIDODERM) 5 % Place 1 patch onto the skin daily 12 hours on, 12 hours off. 30 patch 3    triamterene-hydrochlorothiazide (DYAZIDE) 37.5-25 MG per capsule Take 1 capsule by mouth daily 30 capsule 3    furosemide (LASIX) 20 MG tablet Take 1 tablet by mouth daily 30 tablet 5    diclofenac (VOLTAREN) 75 MG EC tablet Take 1 tablet by mouth 2 times daily 60 tablet 3     No current facility-administered medications on file prior to encounter. REVIEW OF SYSTEMS    Pertinent items are noted in HPI.     Objective:      BP (!) 144/90   Pulse 94   Temp 98.6 °F (37 °C) (Temporal)   Resp 20   Ht 5' 9\" (1.753 m)   Wt (!) 486 lb (220.4 kg)   BMI 71.77 kg/m²     Wt Readings from Last 3 Encounters:   05/03/19 (!) 486 lb (220.4 kg)   03/26/19 (!) 486 lb (220.4 kg)   02/12/19 (!) 486 lb (220.4 kg)       PHYSICAL EXAM    Constitutional:   Well nourished and well developed. Appears neat and clean. Patient is alert, oriented x3, and in no apparent distress. Respiratory:  Respiratory effort is easy and symmetric bilaterally. Rate is normal at rest and on room air. Vascular:  Pedal Pulses is palpable and audible with doppler. Capillary refill is <3 sec to digits bilateral.  Extremities negative for pitting edema. Neurological:  Cranial nerves grossly intact. Deep tendon reflexes of the lower extremities are intact and symmetrical bilaterally. Sensation normal to touch and vibration. Sensation intact to 10 gram monofilament in extremities. Musculoskeletal:  Gait and station stable. Muscle strength +5/5 to all extrinsic muscles to the foot bilateral.  Full range of motion of ankle, subtalar, and midtarsal joints noted without crepitation. No cyanosis, clubbing or edema noted. Dermatological:  Wound description noted in wound assessment. Otherwise, skin is warm, dry, and well-hydrated with normal turgor, texture, and pigmentation. Wound base has increased granulation tissue. It is decreased in size and edema. Psychiatric:  Judgement and insight intact. Short and long term memory intact. No evidence of depression, anxiety, or agitation. Patient is calm, cooperative, and communicative. Appropriate interactions and affect.         Assessment:      Patient Active Problem List   Diagnosis Code    Osteoarthritis M19.90    Sleep apnea G47.30    Finger infection L08.9    Spondylarthrosis M47.9    Thoracolumbar back pain M54.5, M54.6    Right knee pain M25.561    Lateral epicondylitis, R M77.10    Pleuritis R09.1    Atypical chest pain R07.89    Tobacco abuse Z72.0    ETD edges 4/19/2019  8:53 AM   Yue-wound Assessment Dry; Intact 5/3/2019  9:20 AM   Non-staged Wound Description Full thickness 5/3/2019  9:20 AM   [place]%Wound Bed 80 2/12/2019  1:52 PM   Red%Wound Bed 90 5/3/2019  9:20 AM   Yellow%Wound Bed 10 5/3/2019  9:20 AM   Other%Wound Bed 5% white 3/1/2019  9:55 AM   Number of days: 344         Plan:     Promogran dry to wound and cover with sorbion change twice weekly    Treatment Note please see attached Discharge Instructions    In my professional opinion this patient would benefit from HBO Therapy: No    Written patient dismissal instructions given to patient and signed by patient or POA. Discharge Instructions       Home Care: NONE     Wound Location:  Right Lateral Leg     Dressing orders:    1. Cleanse wound(s) with normal saline. 2.Apply Promogran to wound bed. 3.Apply Cutimed Sorbion Sachet Border to wound bed. 4. Change dressing twice a week.     Compression:  Apply med (10-20) compression hose to right lower legs, may remove at bedtime, reapply first thing in the morning, avoid prolonged standing, elevate legs when sitting     Other Instructions:    apply Vicks Vapo Rub to toenails and heels daily before bed.     Keep all dressings clean & dry.  Keep pressure off the wound(s) at all times. Do not shower, take baths or get wound wet, unless otherwise instructed by your Wound Care doctor.     Follow up visit   2 weeks MAY 17, 2019 AT     For Diabetic patients keep blood sugars below 150 for optimal wound healing.     If you experience any of the following, please call the Wound Care Service during business hours: [phone]   *Increase in pain   *Temperature over 101   *Increase in drainage from your wound or a foul odor   *Uncontrolled swelling   *Need for compression bandage changes due to slippage, breakthrough drainage     If you need medical attention outside of business hours, please contact your Primary Care Doctor or go to the nearest emergency room. Keep next scheduled appointment. Simone Miles give 24 hour notice if unable to keep appointment. [phone]     PLEASE NOTE: IF YOU ARE UNABLE TO OBTAIN WOUND SUPPLIES, CONTINUE TO USE THE SUPPLIES YOU HAVE AVAILABLE UNTIL YOU ARE ABLE TO REACH US.  IT IS MOST IMPORTANT TO KEEP THE WOUND COVERED AT ALL TIMES  Electronically signed by Nessa Verma DPM on 5/3/2019 at 10:22 AM            Electronically signed by Nessa Verma DPM on 5/3/2019 at 10:26 AM

## 2019-05-07 ENCOUNTER — TELEPHONE (OUTPATIENT)
Dept: WOUND CARE | Age: 54
End: 2019-05-07

## 2019-05-07 NOTE — TELEPHONE ENCOUNTER
Message received from [address] [place] [address] reporting they are not in patient's insurance network, reports they have tried to contact patient regarding issue, this RN left message with patient regarding the same, requesting patient supply a current insurance card with the wound center, requesting patient return call to possibly order wound supplies from a different DME.

## 2019-05-17 ENCOUNTER — HOSPITAL ENCOUNTER (OUTPATIENT)
Dept: WOUND CARE | Age: 54
Discharge: HOME | End: 2019-05-17
Payer: COMMERCIAL

## 2019-05-17 VITALS
HEART RATE: 84 BPM | WEIGHT: 315 LBS | HEIGHT: 69 IN | DIASTOLIC BLOOD PRESSURE: 65 MMHG | BODY MASS INDEX: 46.65 KG/M2 | RESPIRATION RATE: 20 BRPM | SYSTOLIC BLOOD PRESSURE: 124 MMHG | TEMPERATURE: 97.7 F

## 2019-05-17 PROCEDURE — 11042 DBRDMT SUBQ TIS 1ST 20SQCM/<: CPT

## 2019-05-17 NOTE — CODE DOCUMENTATION
[address] Phys[facility] Billing Sheet. Amara Mark  AGE: 48 y.o.    GENDER: male  : 1965  TODAY'S DATE:  2019    ICD-10 [address] [facility] Problems    Diagnosis Date Noted    Venous insufficiency (chronic) (peripheral) [I87.2] 2018    Non-pressure chronic ulcer of other part of right lower leg with fat layer exposed [facility]) [L97.812] 2018       PHYSICIAN PROCEDURES    CPT CODE  41785 - RT      Electronically signed by Emerson Landon DPM on 2019 at 9:44 AM

## 2019-05-17 NOTE — PROGRESS NOTES
[facility]   Progress Note and Procedure Note      [address] RECORD NUMBER:  [id number]  AGE: 48 y.o. GENDER: male  : 1965  EPISODE DATE:  2019    Subjective:     Chief Complaint   Patient presents with    Wound Check     right leg         HISTORY of PRESENT ILLNESS HPI     Wolfgang Fagan is a 48 y.o. male who presents today for wound/ulcer evaluation. History of Wound Context: Right lateral leg venous ulcer. States he has been compliant using his lymphedema pumps daily. States the legs are still swollen. Denies nausea, vomiting, fevers, or chills.   Wound/Ulcer Pain Timing/Severity: intermittent  Quality of pain: sharp  Severity:  1 / 10   Modifying Factors: Pain worsens with walking  Associated Signs/Symptoms: edema, drainage and pain    Ulcer Identification:  Ulcer Type: venous  Contributing Factors: edema, venous stasis, lymphedema and obesity    Wound: N/A        PAST MEDICAL HISTORY        Diagnosis Date    Abnormal EKG 2016    Abrasion 2014    Atypical chest pain 2013    Class 3 obesity with serious comorbidity and body mass index (BMI) of 60.0 to 69.9 in adult 2018    Cold sore 8/3/2015    Corneal abrasion     Edema 2014    Edema leg     lower leg    ETD (eustachian tube dysfunction) 2013    Hemorrhoids 2015    Hyperglycemia 2016    Knee pain, right     Lateral epicondylitis, R 2013    LBP (low back pain) 2014    Left otitis media 2013    Leg length discrepancy     Leg wound, right 2014    Osteoarthritis     Other and unspecified hyperlipidemia 8/15/2016    Pes planus     Pleuritis 2013    Right knee pain 8/15/2012    Rotator cuff tendinitis     Spondylarthrosis 2012    Thoracolumbar back pain 2012    Thoracolumbar back pain 2012    Tingling in extremities, LUE 2018    Tobacco abuse 2013    Tobacco abuse     Unspecified sleep apnea     Unstable knee 2015    Varicosities     Weight loss 2014       PAST SURGICAL HISTORY    Past Surgical History:   Procedure Laterality Date    COLONOSCOPY  14    POLYPECTOMY Sindhu Pink    FASCIOTOMY Right 2006    triple fasciotomy right leg    TONSILLECTOMY         FAMILY HISTORY    Family History   Problem Relation Age of Onset    Diabetes Mother     Kidney Disease Mother     Cancer Mother         Lung    Cancer Father         Pancreatic    High Blood Pressure Father     Diabetes Other     Cancer Other        SOCIAL HISTORY    Social History     Tobacco Use    Smoking status: Former Smoker     Last attempt to quit: 2014     Years since quittin.2    Smokeless tobacco: Never Used   Substance Use Topics    Alcohol use: Yes    Drug use: Not on file       ALLERGIES    No Known Allergies    MEDICATIONS    Current Outpatient Medications on File Prior to Encounter   Medication Sig Dispense Refill    HYDROcodone-acetaminophen (NORCO) 5-325 MG per tablet Take 1 tablet by mouth every 6 hours as needed for Pain.  econazole nitrate 1 % cream Apply topically 2 times daily 1 Tube 3    sertraline (ZOLOFT) 50 MG tablet Take 1 tablet by mouth daily 30 tablet 11    albuterol sulfate HFA (PROAIR HFA) 108 (90 Base) MCG/ACT inhaler Inhale 2 puffs into the lungs every 6 hours as needed for Wheezing 1 Inhaler 3    lidocaine (LIDODERM) 5 % Place 1 patch onto the skin daily 12 hours on, 12 hours off. 30 patch 3    triamterene-hydrochlorothiazide (DYAZIDE) 37.5-25 MG per capsule Take 1 capsule by mouth daily 30 capsule 3    furosemide (LASIX) 20 MG tablet Take 1 tablet by mouth daily 30 tablet 5    diclofenac (VOLTAREN) 75 MG EC tablet Take 1 tablet by mouth 2 times daily 60 tablet 3     No current facility-administered medications on file prior to encounter. REVIEW OF SYSTEMS    Pertinent items are noted in HPI.     Objective:      /65   Pulse 84   Temp 97.7 °F (36.5 °C) (Temporal)   Resp 20   Ht 5' 9\" (1.753 m)   Wt (!) 486 lb (220.4 kg)   BMI 71.77 kg/m²     Wt Readings from Last 3 Encounters:   05/17/19 (!) 486 lb (220.4 kg)   05/03/19 (!) 486 lb (220.4 kg)   03/26/19 (!) 486 lb (220.4 kg)       PHYSICAL EXAM  General Appearance: alert and oriented to person, place and time, well-developed and well-nourished, in no acute distress  Cardiovascular: normal rate and intact distal pulses  Extremities: no cyanosis and no clubbing  Musculoskeletal: normal range of motion, no joint swelling, deformity or tenderness  Neurologic: gait and coordination normal, speech normal and protective sensation is absent to the right LE. Assessment:     Active Hospital Problems    Diagnosis Date Noted    Venous insufficiency (chronic) (peripheral) [I87.2] 05/24/2018    Non-pressure chronic ulcer of other part of right lower leg with fat layer exposed [facility]) [L97.812] 05/24/2018        Procedure Note  Indications:  Based on my examination of this patient's wound(s)/ulcer(s) today, debridement is required to promote healing and evaluate the wound base. Performed by: Jacquelyn Almeida DPM    Consent obtained:  Yes    Time out taken:  Yes    Pain Control: Anesthetic  Anesthetic: None       Debridement:Excisional Debridement    Using tissue nippers the wound(s)/ulcer(s) was/were sharply debrided down through and including the removal of epidermis, dermis and subcutaneous tissue. Devitalized Tissue Debrided:  fibrin, biofilm and slough    Pre Debridement Measurements:  Are located in the [facility]  Documentation Flow Sheet    Wound/Ulcer #: 1    Post Debridement Measurements:  Wound/Ulcer Descriptions are Pre Debridement except measurements:    Wound 05/24/18 Leg Right; Lower; Lateral #1 (Active)   Wound Image   5/17/2019  9:07 AM   Wound Venous 5/17/2019  9:07 AM   Dressing/Treatment Collagen with Ag;Dry dressing 1/10/2019  2:36 PM   Wound Cleansed Rinsed/Irrigated with saline 5/17/2019  9:07 Care    Physician Orders and Discharge Instructions  [facility]  [address] [facility]  Telephone: [phone]      Himanshu Madera [phone]      NAME: Aggie Wang  YOB: 1965  MEDICAL RECORD NUMBER: [id number]    Home Care: NONE     Wound Location:  Right Lateral Leg     Dressing orders:    1. Cleanse wound(s) with normal saline. 2.Apply Promogran to wound bed. 3.Apply Cutimed Sorbion Sachet Border to wound bed. 4. Change dressing twice a week.     Compression:  Apply med (10-20) compression hose to right lower legs, may remove at bedtime, reapply first thing in the morning, avoid prolonged standing, elevate legs when sitting     Other Instructions:    KEEP LEGS ELEVATED AS MUCH AS POSSIBLE, apply Vicks Vapo Rub to toenails and heels daily before bed.     Keep all dressings clean & dry.  Keep pressure off the wound(s) at all times. Do not shower, take baths or get wound wet, unless otherwise instructed by your Wound Care doctor.     Follow up visit   2 weeks June 4, 2019 AT 1:00PM     For Diabetic patients keep blood sugars below 150 for optimal wound healing.     If you experience any of the following, please call the Wound Care Service during business hours: [phone]   *Increase in pain   *Temperature over 101   *Increase in drainage from your wound or a foul odor   *Uncontrolled swelling   *Need for compression bandage changes due to slippage, breakthrough drainage     If you need medical attention outside of business hours, please contact your Primary Care Doctor or go to the nearest emergency room. Keep next scheduled appointment. Nella Molina give 24 hour notice if unable to keep appointment. [phone]     PLEASE NOTE: IF YOU ARE UNABLE TO OBTAIN WOUND SUPPLIES, CONTINUE TO USE THE SUPPLIES YOU HAVE AVAILABLE UNTIL YOU ARE ABLE TO REACH US.  IT IS MOST IMPORTANT TO KEEP THE WOUND COVERED AT ALL TIMES    Electronically signed by Crescencio Marvin DPM on 5/17/2019 at 9:40 AM          Electronically signed by Miguelangel Parks DPM on 5/17/2019 at 9:40 AM

## 2019-06-04 ENCOUNTER — HOSPITAL ENCOUNTER (OUTPATIENT)
Dept: WOUND CARE | Age: 54
Discharge: HOME | End: 2019-06-04
Payer: COMMERCIAL

## 2019-06-04 VITALS
DIASTOLIC BLOOD PRESSURE: 69 MMHG | HEART RATE: 80 BPM | HEIGHT: 69 IN | WEIGHT: 315 LBS | TEMPERATURE: 98.1 F | RESPIRATION RATE: 20 BRPM | BODY MASS INDEX: 46.65 KG/M2 | SYSTOLIC BLOOD PRESSURE: 142 MMHG

## 2019-06-04 PROCEDURE — 29581 APPL MULTLAYER CMPRN SYS LEG: CPT

## 2019-06-04 NOTE — PLAN OF CARE
Problem: Wound:  Goal: Will show signs of wound healing; wound closure and no evidence of infection  Description  Will show signs of wound healing; wound closure and no evidence of infection    Outcome: Ongoing

## 2019-06-04 NOTE — CODE DOCUMENTATION
[address] Physician Billing Sheet. Paul Mccabe  AGE: 48 y.o.    GENDER: male  : 1965  TODAY'S DATE:  2019    ICD-10 CODES  Active Hospital Problems    Diagnosis Date Noted    Non-pressure chronic ulcer [facility] other part of right lower leg with [facility] layer [facility] [L97.812] 2018    Venous insufficiency (chronic) (peripheral) [I87.2] 2018       PHYSICIAN PROCEDURES    CPT CODE  88009      Electronically signed by Zia Fontenot DPM on 2019 at 2:11 PM

## 2019-06-04 NOTE — PROGRESS NOTES
loss 2014       PAST SURGICAL HISTORY    Past Surgical History:   Procedure Laterality Date    COLONOSCOPY  14    POLYPECTOMY Yumiko Deutscher    FASCIOTOMY Right 2006    triple fasciotomy right leg    TONSILLECTOMY         FAMILY HISTORY    Family History   Problem Relation Age of Onset    Diabetes Mother     Kidney Disease Mother     Cancer Mother         Lung    Cancer Father         Pancreatic    High Blood Pressure Father     Diabetes Other     Cancer Other        SOCIAL HISTORY    Social History     Tobacco Use    Smoking status: Former Smoker     Last attempt to quit: 2014     Years since quittin.3    Smokeless tobacco: Never Used   Substance Use Topics    Alcohol use: Yes    Drug use: Not on file       ALLERGIES    No Known Allergies    MEDICATIONS    Current Outpatient Medications on File Prior to Encounter   Medication Sig Dispense Refill    HYDROcodone-acetaminophen (NORCO) 5-325 MG per tablet Take 1 tablet by mouth every 6 hours as needed for Pain.  econazole nitrate 1 % cream Apply topically 2 times daily 1 Tube 3    sertraline (ZOLOFT) 50 MG tablet Take 1 tablet by mouth daily 30 tablet 11    albuterol sulfate HFA (PROAIR HFA) 108 (90 Base) MCG/ACT inhaler Inhale 2 puffs into the lungs every 6 hours as needed for Wheezing 1 Inhaler 3    lidocaine (LIDODERM) 5 % Place 1 patch onto the skin daily 12 hours on, 12 hours off. 30 patch 3    triamterene-hydrochlorothiazide (DYAZIDE) 37.5-25 MG per capsule Take 1 capsule by mouth daily 30 capsule 3    furosemide (LASIX) 20 MG tablet Take 1 tablet by mouth daily 30 tablet 5    diclofenac (VOLTAREN) 75 MG EC tablet Take 1 tablet by mouth 2 times daily 60 tablet 3     No current facility-administered medications on file prior to encounter. REVIEW OF SYSTEMS    Pertinent items are noted in HPI.     Objective:      BP (!) 142/69   Pulse 80   Temp 98.1 °F (36.7 °C) (Temporal)   Resp 20   Ht 5' 9\" (1.753 m)   Wt (!) 486 lb (220.4 kg)   BMI 71.77 kg/m²     Wt Readings from Last 3 Encounters:   06/04/19 (!) 486 lb (220.4 kg)   05/17/19 (!) 486 lb (220.4 kg)   05/03/19 (!) 486 lb (220.4 kg)       PHYSICAL EXAM    Constitutional:   Well nourished and well developed. Appears neat and clean. Patient is alert, oriented x3, and in no apparent distress. Respiratory:  Respiratory effort is easy and symmetric bilaterally. Rate is normal at rest and on room air. Vascular:  Pedal Pulses is palpable and audible with doppler. Capillary refill is <3 sec to digits bilateral.  Extremities negative for pitting edema. Neurological:  Cranial nerves grossly intact. Deep tendon reflexes of the lower extremities are intact and symmetrical bilaterally. Sensation normal to touch and vibration. Sensation intact to 10 gram monofilament in extremities. Musculoskeletal:  Gait and station stable. Muscle strength +5/5 to all extrinsic muscles to the foot bilateral.  Full range of motion of ankle, subtalar, and midtarsal joints noted without crepitation. No cyanosis, clubbing or edema noted. Dermatological:  Wound description noted in wound assessment. Otherwise, skin is warm, dry, and well-hydrated with normal turgor, texture, and pigmentation. Wound base has increased granulation tissue The wound has increased in size as well as edema. Psychiatric:  Judgement and insight intact. Short and long term memory intact. No evidence of depression, anxiety, or agitation. Patient is calm, cooperative, and communicative. Appropriate interactions and affect.         Assessment:      Patient Active Problem List   Diagnosis Code    Osteoarthritis M19.90    Sleep apnea G47.30    Finger infection L08.9    Spondylarthrosis M47.9    Thoracolumbar back pain M54.5, M54.6    Right knee pain M25.561    Lateral epicondylitis, R M77.10    Pleuritis R09.1    Atypical chest pain R07.89    Tobacco abuse Z72.0    ETD (eustachian tube dysfunction) H69.80    Left otitis media H66.92    Low back pain M54.5    Leg wound, right S81.801A    Abrasion T14. 8XXA    Leg wound, right S81.801A    Edema R60.9    Weight loss R63.4    Hemorrhoids K64.9    Unstable knee M25.369    Cold sore B00.1    Anal fissure K60.2    Other and unspecified hyperlipidemia E78.5    Abnormal EKG R94.31    Hyperglycemia R73.9    Synovitis and tenosynovitis, unspecified M65.9    Class 3 obesity with serious comorbidity and body mass index (BMI) of 60.0 to 69.9 in adult [id number]    Tingling in extremities, LUE R20.2    Non-pressure chronic ulcer of other part of right lower leg with fat layer exposed ([address] L97.812    Venous insufficiency (chronic) (peripheral) I87.2        Procedure Note  Indications:  Based on my examination of this patient's wound(s)/ulcer(s) today, debridement is not required to promote healing and evaluate the wound base. Wound 05/24/18 Leg Right; Lower; Lateral #1 (Active)   Wound Image   6/4/2019  1:07 PM   Wound Venous 6/4/2019  1:07 PM   Dressing/Treatment Collagen with Ag;Dry dressing 1/10/2019  2:36 PM   Wound Cleansed Rinsed/Irrigated with saline 6/4/2019  1:07 PM   Wound Length (cm) 3 cm 6/4/2019  1:07 PM   Wound Width (cm) 1.4 cm 6/4/2019  1:07 PM   Wound Depth (cm) 0.2 cm 6/4/2019  1:07 PM   Wound Surface Area (cm^2) 4.2 cm^2 6/4/2019  1:07 PM   Change in Wound Size % (l*w) -33.33 6/4/2019  1:07 PM   Wound Volume (cm^3) 0.84 cm^3 6/4/2019  1:07 PM   Wound Healing % -33 6/4/2019  1:07 PM   Post-Procedure Length (cm) 2.7 cm 5/17/2019  9:40 AM   Post-Procedure Width (cm) 1.3 cm 5/17/2019  9:40 AM   Post-Procedure Depth (cm) 0.2 cm 5/17/2019  9:40 AM   Post-Procedure Surface Area (cm^2) 3.51 cm^2 5/17/2019  9:40 AM   Post-Procedure Volume (cm^3) 0.7 cm^3 5/17/2019  9:40 AM   Drainage Amount Small 6/4/2019  1:07 PM   Drainage Description Serosanguinous 6/4/2019  1:07 PM   Odor None 6/4/2019  1:07 PM   Margins Defined edges 6/4/2019  1:07 PM   Yue-wound Assessment Dry; Intact 6/4/2019  1:07 PM   Non-staged Wound Description Full thickness 6/4/2019  1:07 PM   [place]%Wound Bed 80 2/12/2019  1:52 PM   Red%Wound Bed 90 6/4/2019  1:07 PM   Yellow%Wound Bed 10 6/4/2019  1:07 PM   Other%Wound Bed 5% white 3/1/2019  9:55 AM   Number of days: 376     If swelling better plan for another appligraf. Promogran dry to wound and cover with draw mariel and coban 2 change in one week  Watch salt intake and elevate legs  Treatment Note please see attached Discharge Instructions    In my professional opinion this patient would benefit from HBO Therapy: No    Written patient dismissal instructions given to patient and signed by patient or POA. Discharge Instructions       [facility]    Physician Orders and Discharge Instructions  [address]  Telephone: [phone]      [phone]        NAME: Emmanuel Haines  YOB: 1965  MEDICAL RECORD NUMBER: [id number]     Home Care: NONE     Wound Location:  Right Lateral Leg     Dressing orders:    1. Cleanse wound(s) with normal saline. 2.Apply Promogran to wound bed. 3.Apply double layer of Drawtex, build up ankle area with soft roll. 4. Apply Coban 2 to right lower leg. 4. Leave all dressings in place until seen in wound center in one week.     Compression: see above order for Coban 2.     Offloading Device: none     Other Instructions:    KEEP LEGS ELEVATED AS MUCH AS POSSIBLE, apply Vicks Vapo Rub to toenails and heels daily before bed.     Keep all dressings clean & dry.  Keep pressure off the wound(s) at all times. Do not shower, take baths or get wound wet, unless otherwise instructed by your Wound Care doctor.     Follow up visit   1 weeks June 11, 2019 AT     Please give 24 hour notice if unable to keep appointment.      For Diabetic patients keep blood sugars below 150 for optimal wound healing.     If you

## 2019-06-11 ENCOUNTER — HOSPITAL ENCOUNTER (OUTPATIENT)
Dept: WOUND CARE | Age: 54
Discharge: HOME | End: 2019-06-11
Payer: COMMERCIAL

## 2019-06-11 VITALS
TEMPERATURE: 97.6 F | SYSTOLIC BLOOD PRESSURE: 169 MMHG | RESPIRATION RATE: 20 BRPM | HEART RATE: 89 BPM | WEIGHT: 315 LBS | DIASTOLIC BLOOD PRESSURE: 81 MMHG | HEIGHT: 69 IN | BODY MASS INDEX: 46.65 KG/M2

## 2019-06-11 PROCEDURE — 99213 OFFICE O/P EST LOW 20 MIN: CPT

## 2019-06-11 NOTE — CODE DOCUMENTATION
[address] Physician Billing Sheet. William Ware  AGE: 48 y.o.    GENDER: male  : 1965  TODAY'S DATE:  2019    ICD-10 2000 [facility] [address] Problems    Diagnosis Date Noted    Non-pressure chronic ulcer of other part of right lower leg with fat layer exposed ([facility] [address] [L97.812] 2018    Venous insufficiency (chronic) (peripheral) [I87.2] 2018       PHYSICIAN PROCEDURES    CPT CODE  22721      Electronically signed by Tony Marks DPM on 2019 at 3:15 PM

## 2019-06-11 NOTE — PROGRESS NOTES
[address]                                                   Progress Note and Procedure Note      [address] RECORD NUMBER:  [id number]  AGE: 48 y.o. GENDER: male  : 1965  EPISODE DATE:  2019    Subjective:     Chief Complaint   Patient presents with    Wound Check     right leg         HISTORY of PRESENT ILLNESS HPI     Marisela Naqvi is a 48 y.o. male who presents today for wound/ulcer evaluation. History of Wound Context: Full-thickness venous ulceration right lower leg is slightly deeper today. He did not tolerate the wraps, only lasted 3 days, due to body habitus and shape of legs. Denies nausea, vomiting, fever, or chills.   Wound/Ulcer Pain Timing/Severity: none  Quality of pain: N/A  Severity:  0 / 10   Modifying Factors: None  Associated Signs/Symptoms: edema    Ulcer Identification:  Ulcer Type: venous  Contributing Factors: venous stasis and obesity    Wound: N/A        PAST MEDICAL HISTORY        Diagnosis Date    Abnormal EKG 2016    Abrasion 2014    Atypical chest pain 2013    Class 3 obesity with serious comorbidity and body mass index (BMI) of 60.0 to 69.9 in adult 2018    Cold sore 8/3/2015    Corneal abrasion     Edema 2014    Edema leg     lower leg    ETD (eustachian tube dysfunction) 2013    Hemorrhoids 2015    Hyperglycemia 2016    Knee pain, right     Lateral epicondylitis, R 2013    LBP (low back pain) 2014    Left otitis media 2013    Leg length discrepancy     Leg wound, right 2014    Osteoarthritis     Other and unspecified hyperlipidemia 8/15/2016    Pes planus     Pleuritis 2013    Right knee pain 8/15/2012    Rotator cuff tendinitis     Spondylarthrosis 2012    Thoracolumbar back pain 2012    Thoracolumbar back pain 2012    Tingling in extremities, LUE 2018    Tobacco abuse 2013    Tobacco abuse     Unspecified sleep apnea     Unstable knee 2015    Varicosities     Weight loss 2014       PAST SURGICAL HISTORY    Past Surgical History:   Procedure Laterality Date    COLONOSCOPY  14    POLYPECTOMY [address]    FASCIOTOMY Right 2006    triple fasciotomy right leg    TONSILLECTOMY         FAMILY HISTORY    Family History   Problem Relation Age of Onset    Diabetes Mother     Kidney Disease Mother     Cancer Mother         Lung    Cancer Father         Pancreatic    High Blood Pressure Father     Diabetes Other     Cancer Other        SOCIAL HISTORY    Social History     Tobacco Use    Smoking status: Former Smoker     Last attempt to quit: 2014     Years since quittin.3    Smokeless tobacco: Never Used   Substance Use Topics    Alcohol use: Yes    Drug use: Not on file       ALLERGIES    No Known Allergies    MEDICATIONS    Current Outpatient Medications on File Prior to Encounter   Medication Sig Dispense Refill    HYDROcodone-acetaminophen (NORCO) 5-325 MG per tablet Take 1 tablet by mouth every 6 hours as needed for Pain.  econazole nitrate 1 % cream Apply topically 2 times daily 1 Tube 3    sertraline (ZOLOFT) 50 MG tablet Take 1 tablet by mouth daily 30 tablet 11    albuterol sulfate HFA (PROAIR HFA) 108 (90 Base) MCG/ACT inhaler Inhale 2 puffs into the lungs every 6 hours as needed for Wheezing 1 Inhaler 3    lidocaine (LIDODERM) 5 % Place 1 patch onto the skin daily 12 hours on, 12 hours off. 30 patch 3    triamterene-hydrochlorothiazide (DYAZIDE) 37.5-25 MG per capsule Take 1 capsule by mouth daily 30 capsule 3    furosemide (LASIX) 20 MG tablet Take 1 tablet by mouth daily 30 tablet 5    diclofenac (VOLTAREN) 75 MG EC tablet Take 1 tablet by mouth 2 times daily 60 tablet 3     No current facility-administered medications on file prior to encounter. REVIEW OF SYSTEMS    Pertinent items are noted in HPI.     Objective:      BP (!) 169/81   Pulse 89   Temp 97.6 °F (36.4 °C) (Temporal)   Resp 20   Ht 5' 9\" (1.753 m)   Wt (!) 486 lb (220.4 kg)   BMI 71.77 kg/m²     Wt Readings from Last 3 Encounters:   06/11/19 (!) 486 lb (220.4 kg)   06/04/19 (!) 486 lb (220.4 kg)   05/17/19 (!) 486 lb (220.4 kg)       PHYSICAL EXAM    Constitutional:   Well nourished and well developed. Appears neat and clean. Patient is alert, oriented x3, and in no apparent distress. Respiratory:  Respiratory effort is easy and symmetric bilaterally. Rate is normal at rest and on room air. Vascular:  Pedal Pulses is palpable and audible with doppler. Capillary refill is <3 sec to digits bilateral.  Extremities negative for pitting edema. Neurological:  Cranial nerves grossly intact. Deep tendon reflexes of the lower extremities are intact and symmetrical bilaterally. Sensation normal to touch and vibration. Sensation intact to 10 gram monofilament in extremities. Musculoskeletal:  Gait and station stable. Muscle strength +5/5 to all extrinsic muscles to the foot bilateral.  Full range of motion of ankle, subtalar, and midtarsal joints noted without crepitation. No cyanosis, clubbing or edema noted. Dermatological:  Wound description noted in wound assessment. Otherwise, skin is warm, dry, and well-hydrated with normal turgor, texture, and pigmentation. Wound base is granular and slightly deeper. Psychiatric:  Judgement and insight intact. Short and long term memory intact. No evidence of depression, anxiety, or agitation. Patient is calm, cooperative, and communicative. Appropriate interactions and affect.         Assessment:      Patient Active Problem List   Diagnosis Code    Osteoarthritis M19.90    Sleep apnea G47.30    Finger infection L08.9    Spondylarthrosis M47.9    Thoracolumbar back pain M54.5, M54.6    Right knee pain M25.561    Lateral epicondylitis, R M77.10    Pleuritis R09.1    Atypical chest pain R07.89    Tobacco abuse Z72.0    ETD (eustachian tube 6/11/2019  2:29 PM   Yue-wound Assessment Dry; Intact 6/11/2019  2:29 PM   Non-staged Wound Description Full thickness 6/4/2019  1:07 PM   [place]%Wound Bed 80 2/12/2019  1:52 PM   Red%Wound Bed 95 6/11/2019  2:29 PM   Yellow%Wound Bed 5 6/11/2019  2:29 PM   Other%Wound Bed 5% white 3/1/2019  9:55 AM   Number of days: 383       Promogran dry to wound and cover withpromogran and sorbion . Tubigrips  Watch salt intake and elevate legs  Treatment Note please see attached Discharge Instructions    I  Written patient dismissal instructions given to patient and signed by patient or POA.                Electronically signed by Bro Smith DPM on 6/11/2019 at 3:05 PM

## 2019-06-25 ENCOUNTER — HOSPITAL ENCOUNTER (OUTPATIENT)
Dept: WOUND CARE | Age: 54
Discharge: HOME | End: 2019-06-25
Payer: COMMERCIAL

## 2019-06-25 VITALS
TEMPERATURE: 97.9 F | HEART RATE: 86 BPM | DIASTOLIC BLOOD PRESSURE: 75 MMHG | RESPIRATION RATE: 20 BRPM | SYSTOLIC BLOOD PRESSURE: 145 MMHG

## 2019-06-25 PROCEDURE — 99213 OFFICE O/P EST LOW 20 MIN: CPT

## 2019-06-25 NOTE — PROGRESS NOTES
[address]                                                   Progress Note and Procedure Note      [address] RECORD NUMBER:  [id number]  AGE: 48 y.o. GENDER: male  : 1965  EPISODE DATE:  2019    Subjective:     Chief Complaint   Patient presents with    Wound Check     right lat leg         HISTORY of PRESENT ILLNESS HPI     Khushbu Fong is a 48 y.o. male who presents today for wound/ulcer evaluation. History of Wound Context: Full-thickness venous ulceration right lower leg is slightly smaller today. No issues with promogran and sorbion dressings. Denies nausea, vomiting, fever, or chills.   Wound/Ulcer Pain Timing/Severity: none  Quality of pain: N/A  Severity:  0 / 10   Modifying Factors: None  Associated Signs/Symptoms: edema    Ulcer Identification:  Ulcer Type: venous  Contributing Factors: venous stasis and obesity    Wound: N/A        PAST MEDICAL HISTORY        Diagnosis Date    Abnormal EKG 2016    Abrasion 2014    Atypical chest pain 2013    Class 3 obesity with serious comorbidity and body mass index (BMI) of 60.0 to 69.9 in adult 2018    Cold sore 8/3/2015    Corneal abrasion     Edema 2014    Edema leg     lower leg    ETD (eustachian tube dysfunction) 2013    Hemorrhoids 2015    Hyperglycemia 2016    Knee pain, right     Lateral epicondylitis, R 2013    LBP (low back pain) 2014    Left otitis media 2013    Leg length discrepancy     Leg wound, right 2014    Osteoarthritis     Other and unspecified hyperlipidemia 8/15/2016    Pes planus     Pleuritis 2013    Right knee pain 8/15/2012    Rotator cuff tendinitis     Spondylarthrosis 2012    Thoracolumbar back pain 2012    Thoracolumbar back pain 2012    Tingling in extremities, LUE 2018    Tobacco abuse 2013    Tobacco abuse     Unspecified sleep apnea     Unstable knee 2015    Varicosities  Weight loss 2014       PAST SURGICAL HISTORY    Past Surgical History:   Procedure Laterality Date    COLONOSCOPY  14    POLYPECTOMY [address]    FASCIOTOMY Right 2006    triple fasciotomy right leg    TONSILLECTOMY         FAMILY HISTORY    Family History   Problem Relation Age of Onset    Diabetes Mother     Kidney Disease Mother     Cancer Mother         Lung    Cancer Father         Pancreatic    High Blood Pressure Father     Diabetes Other     Cancer Other        SOCIAL HISTORY    Social History     Tobacco Use    Smoking status: Former Smoker     Last attempt to quit: 2014     Years since quittin.3    Smokeless tobacco: Never Used   Substance Use Topics    Alcohol use: Yes    Drug use: Not on file       ALLERGIES    No Known Allergies    MEDICATIONS    Current Outpatient Medications on File Prior to Encounter   Medication Sig Dispense Refill    HYDROcodone-acetaminophen (NORCO) 5-325 MG per tablet Take 1 tablet by mouth every 6 hours as needed for Pain.  econazole nitrate 1 % cream Apply topically 2 times daily 1 Tube 3    sertraline (ZOLOFT) 50 MG tablet Take 1 tablet by mouth daily 30 tablet 11    albuterol sulfate HFA (PROAIR HFA) 108 (90 Base) MCG/ACT inhaler Inhale 2 puffs into the lungs every 6 hours as needed for Wheezing 1 Inhaler 3    lidocaine (LIDODERM) 5 % Place 1 patch onto the skin daily 12 hours on, 12 hours off. 30 patch 3    triamterene-hydrochlorothiazide (DYAZIDE) 37.5-25 MG per capsule Take 1 capsule by mouth daily 30 capsule 3    furosemide (LASIX) 20 MG tablet Take 1 tablet by mouth daily 30 tablet 5    diclofenac (VOLTAREN) 75 MG EC tablet Take 1 tablet by mouth 2 times daily 60 tablet 3     No current facility-administered medications on file prior to encounter. REVIEW OF SYSTEMS    Pertinent items are noted in HPI.     Objective:      BP (!) 145/75   Pulse 86   Temp 97.9 °F (36.6 °C) (Temporal)   Resp 20     Wt Readings from Abrasion T14. 8XXA    Leg wound, right S81.801A    Edema R60.9    Weight loss R63.4    Hemorrhoids K64.9    Unstable knee M25.369    Cold sore B00.1    Anal fissure K60.2    Other and unspecified hyperlipidemia E78.5    Abnormal EKG R94.31    Hyperglycemia R73.9    Synovitis and tenosynovitis, unspecified M65.9    Class 3 obesity with serious comorbidity and body mass index (BMI) of 60.0 to 69.9 in adult [id number]    Tingling in extremities, LUE R20.2    Non-pressure chronic ulcer of other part of right lower leg with fat layer exposed ([address] L97.812    Venous insufficiency (chronic) (peripheral) I87.2        Procedure Note  Indications:  Based on my examination of this patient's wound(s)/ulcer(s) today, debridement is not required to promote healing and evaluate the wound base. Wound Care Documentation:  Wound 05/24/18 Leg Right; Lower; Lateral #1 (Active)   Wound Image   6/25/2019  8:50 AM   Wound Venous 6/25/2019  8:50 AM   Dressing/Treatment Collagen with Ag;Dry dressing 1/10/2019  2:36 PM   Wound Cleansed Rinsed/Irrigated with saline 6/25/2019  8:50 AM   Wound Length (cm) 2.9 cm 6/25/2019  8:50 AM   Wound Width (cm) 1.2 cm 6/25/2019  8:50 AM   Wound Depth (cm) 0.3 cm 6/25/2019  8:50 AM   Wound Surface Area (cm^2) 3.48 cm^2 6/25/2019  8:50 AM   Change in Wound Size % (l*w) -10.48 6/25/2019  8:50 AM   Wound Volume (cm^3) 1.04 cm^3 6/25/2019  8:50 AM   Wound Healing % -65 6/25/2019  8:50 AM   Post-Procedure Length (cm) 2.7 cm 5/17/2019  9:40 AM   Post-Procedure Width (cm) 1.3 cm 5/17/2019  9:40 AM   Post-Procedure Depth (cm) 0.2 cm 5/17/2019  9:40 AM   Post-Procedure Surface Area (cm^2) 3.51 cm^2 5/17/2019  9:40 AM   Post-Procedure Volume (cm^3) 0.7 cm^3 5/17/2019  9:40 AM   Drainage Amount Small 6/25/2019  8:50 AM   Drainage Description Serosanguinous; Yellow 6/25/2019  8:50 AM   Odor None 6/25/2019  8:50 AM   Margins Defined edges 6/25/2019  8:50 AM   Yue-wound Assessment Dry; Intact; Pink 6/25/2019 8:50 AM   Non-staged Wound Description Full thickness 6/4/2019  1:07 PM   [place]%Wound Bed 50 6/25/2019  8:50 AM   Red%Wound Bed 50 6/25/2019  8:50 AM   Yellow%Wound Bed 5 6/11/2019  2:29 PM   Other%Wound Bed 5% white 3/1/2019  9:55 AM   Number of days: 397     Promogran dry to wound and cover withpromogran and sorbion . Tubigrips  Watch salt intake and elevate legs  Plan for appligraft application next Tuesday  Work note dispensed today from patient to be off through 7/5    Treatment Note please see attached Discharge Instructions    Written patient dismissal instructions given to patient and signed by patient or POA. Discharge Instructions       [facility]ound [facility]    Physician Orders and Discharge Instructions  [address]  Telephone: [phone]      [phone]      NAME:  Lucy Araujo OF BIRTH:  1965  MEDICAL RECORD NUMBER:  [id number]    Home Care/Facility:  NONE    Wound Location:  RIGHT LATERAL LEG    Dressing orders: 1. Cleanse wound(s) with normal saline. 2.Apply PROMOGRAN THEN CUTIMED SORBION to wound bed. 3. CHANGE DRESSING THREE TIMES A WEEK. Compression:  Apply 10-20mmHg compression hose to  right lower leg(s), may remove at bedtime, reapply first thing in the morning, avoid prolonged standing, elevate legs when sitting. Offloading Device:  NONE    Other Instructions: continue to use lymphedema pumps, elevate legs as much as possible. Keep all dressings clean, dry and intact. Keep pressure off the wound(s) at all times. Follow up visit   1 Week  July 2, 2019   FOR APLIGRAF APPLICATION    Please give 24 hour notice if unable to keep appointment. [phone]    If you experience any of the following, please call the Wound Care Service at  [phone] or go to the nearest emergency room.      *Increase in pain *Temperature over 101 *Increase in drainage from your wound or a foul odor  *Uncontrolled swelling *Need for compression bandage changes due to slippage, breakthrough drainage       PLEASE NOTE: IF YOU ARE UNABLE TO OBTAIN WOUND SUPPLIES, CONTINUE TO USE THE SUPPLIES YOU HAVE AVAILABLE UNTIL YOU ARE ABLE TO REACH US.  IT IS MOST IMPORTANT TO KEEP THE WOUND COVERED AT ALL TIMES      Electronically signed by Tony Marks DPM on 6/25/2019 at 9:12 AM            Electronically signed by Tony Marks DPM on 6/25/2019 at 9:13 AM

## 2019-06-25 NOTE — LETTER
[address] [id number]  Phone: [phone]    Charley Medrano DPM              June 25, 2019     Patient: Shelli Perry   YOB: 1965   Date of Visit: 6/25/2019       To Whom It May Concern: It is my medical opinion that Yesy Loots should remain out of work from July 2, 2019 to July 6, 2019. If you have any questions or concerns, please don't hesitate to call.     Sincerely,        Murtaza Figueroa DPM

## 2019-06-25 NOTE — CODE DOCUMENTATION
[address] Physician Billing Sheet. William Ware  AGE: 48 y.o.    GENDER: male  : 1965  TODAY'S DATE:  2019    ICD-10 2000 [facility] [address] [address] Problems    Diagnosis Date Noted    Non-pressure chronic ulcer of other part of right lower leg with fat layer exposed ([facility] [address] [L97.812] 2018    Venous insufficiency (chronic) (peripheral) [I87.2] 2018       PHYSICIAN PROCEDURES    CPT CODE  07545      Electronically signed by Tony Marks DPM on 2019 at 9:17 AM

## 2019-07-02 ENCOUNTER — HOSPITAL ENCOUNTER (OUTPATIENT)
Dept: WOUND CARE | Age: 54
Discharge: HOME | End: 2019-07-02
Payer: COMMERCIAL

## 2019-07-02 VITALS
HEIGHT: 69 IN | SYSTOLIC BLOOD PRESSURE: 153 MMHG | HEART RATE: 81 BPM | BODY MASS INDEX: 46.65 KG/M2 | TEMPERATURE: 97.5 F | RESPIRATION RATE: 20 BRPM | DIASTOLIC BLOOD PRESSURE: 76 MMHG | WEIGHT: 315 LBS

## 2019-07-02 PROCEDURE — 15271 SKIN SUB GRAFT TRNK/ARM/LEG: CPT

## 2019-07-02 NOTE — PROGRESS NOTES
comorbidity and body mass index (BMI) of 60.0 to 69.9 in adult [id number]    Tingling in extremities, LUE R20.2    Non-pressure chronic ulcer of other part of right lower leg with fat layer exposed ([facility] [address] L97.812    Venous insufficiency (chronic) (peripheral) I87.2        Procedure Note  Indications:  Based on my examination of this patient's wound(s)/ulcer(s) today, debridement is required to prepare the wound bed today for application of a skin substitute. Performed by: Pritesh Antunez DPM    Consent obtained:  Yes    Time out taken:  Yes    Pain Control: Anesthetic  Anesthetic: None       Debridement:Excisional Debridement    Using curette the wound(s)/ulcer(s) was/were sharply debrided down through and including the removal of epidermis, dermis and subcutaneous tissue. Devitalized Tissue Debrided:  slough    Pre Debridement Measurements:  Are located in the [place]  Documentation Flow Sheet    Wound/Ulcer #: 1    Post Debridement Measurements:  Wound/Ulcer Descriptions are Pre Debridement except measurements:    Wound 05/24/18 Leg Right; Lower; Lateral #1 (Active)   Wound Image   7/2/2019 10:43 AM   Wound Venous 7/2/2019 10:43 AM   Dressing/Treatment Collagen with Ag;Dry dressing 1/10/2019  2:36 PM   Wound Cleansed Rinsed/Irrigated with saline 7/2/2019 10:43 AM   Wound Length (cm) 5 cm 7/2/2019 10:43 AM   Wound Width (cm) 6 cm 7/2/2019 10:43 AM   Wound Depth (cm) 0.4 cm 7/2/2019 10:43 AM   Wound Surface Area (cm^2) 30 cm^2 7/2/2019 10:43 AM   Change in Wound Size % (l*w) -852.38 7/2/2019 10:43 AM   Wound Volume (cm^3) 12 cm^3 7/2/2019 10:43 AM   Wound Healing % -0670 7/2/2019 10:43 AM   Post-Procedure Length (cm) 2.7 cm 5/17/2019  9:40 AM   Post-Procedure Width (cm) 1.3 cm 5/17/2019  9:40 AM   Post-Procedure Depth (cm) 0.2 cm 5/17/2019  9:40 AM   Post-Procedure Surface Area (cm^2) 3.51 cm^2 5/17/2019  9:40 AM   Post-Procedure Volume (cm^3) 0.7 cm^3 5/17/2019  9:40 AM   Drainage Amount Large 7/2/2019

## 2019-07-05 ENCOUNTER — HOSPITAL ENCOUNTER (OUTPATIENT)
Dept: WOUND CARE | Age: 54
Discharge: HOME | End: 2019-07-05
Payer: COMMERCIAL

## 2019-07-05 VITALS
RESPIRATION RATE: 22 BRPM | WEIGHT: 315 LBS | BODY MASS INDEX: 46.65 KG/M2 | SYSTOLIC BLOOD PRESSURE: 161 MMHG | HEIGHT: 69 IN | TEMPERATURE: 97.5 F | HEART RATE: 91 BPM | DIASTOLIC BLOOD PRESSURE: 97 MMHG

## 2019-07-05 PROCEDURE — 99213 OFFICE O/P EST LOW 20 MIN: CPT

## 2019-07-05 RX ORDER — SULFAMETHOXAZOLE AND TRIMETHOPRIM 200; 40 MG/5ML; MG/5ML
160 SUSPENSION ORAL 2 TIMES DAILY
COMMUNITY
End: 2019-07-12 | Stop reason: ALTCHOICE

## 2019-07-12 ENCOUNTER — HOSPITAL ENCOUNTER (OUTPATIENT)
Dept: WOUND CARE | Age: 54
Discharge: HOME | End: 2019-07-12
Payer: COMMERCIAL

## 2019-07-12 VITALS — WEIGHT: 315 LBS | RESPIRATION RATE: 20 BRPM | BODY MASS INDEX: 71.47 KG/M2 | TEMPERATURE: 97.1 F | HEART RATE: 78 BPM

## 2019-07-12 PROCEDURE — 99213 OFFICE O/P EST LOW 20 MIN: CPT

## 2019-07-12 NOTE — PLAN OF CARE
Problem: Wound:  Goal: Will show signs of wound healing; wound closure and no evidence of infection  Outcome: Ongoing

## 2019-07-12 NOTE — PROGRESS NOTES
Adrianna Garcia 37                                                   Progress Note and Procedure Note      [address] RECORD NUMBER:  [id number]  AGE: 48 y.o. GENDER: male  : 1965  EPISODE DATE:  2019    Subjective:     Chief Complaint   Patient presents with    Wound Check     right leg ulcer         HISTORY of PRESENT ILLNESS HPI     Zeny Mcfarlane is a 48 y.o. male who presents today for wound/ulcer evaluation. History of Wound Context: Full-thickness venous ulceration right lower leg is slightly deeper today. He is s/p 2 weeks of [address] # 2.   Wound/Ulcer Pain Timing/Severity: none  Quality of pain: N/A  Severity:  0 / 10   Modifying Factors: None  Associated Signs/Symptoms: edema    Ulcer Identification:  Ulcer Type: venous  Contributing Factors: venous stasis and obesity    Wound: N/A        PAST MEDICAL HISTORY        Diagnosis Date    Abnormal EKG 2016    Abrasion 2014    Atypical chest pain 2013    Class 3 obesity with serious comorbidity and body mass index (BMI) of 60.0 to 69.9 in adult 2018    Cold sore 8/3/2015    Corneal abrasion     Edema 2014    Edema leg     lower leg    ETD (eustachian tube dysfunction) 2013    Hemorrhoids 2015    Hyperglycemia 2016    Knee pain, right     Lateral epicondylitis, R 2013    LBP (low back pain) 2014    Left otitis media 2013    Leg length discrepancy     Leg wound, right 2014    Osteoarthritis     Other and unspecified hyperlipidemia 8/15/2016    Pes planus     Pleuritis 2013    Right knee pain 8/15/2012    Rotator cuff tendinitis     Spondylarthrosis 2012    Thoracolumbar back pain 2012    Thoracolumbar back pain 2012    Tingling in extremities, LUE 2018    Tobacco abuse 2013    Tobacco abuse     Unspecified sleep apnea     Unstable knee 2015    Varicosities     Weight loss 2014       PAST SURGICAL HISTORY    Past Surgical History:   Procedure Laterality Date    COLONOSCOPY  14    POLYPECTOMY Robinson Hernandez    FASCIOTOMY Right 2006    triple fasciotomy right leg    TONSILLECTOMY         FAMILY HISTORY    Family History   Problem Relation Age of Onset    Diabetes Mother     Kidney Disease Mother     Cancer Mother         Lung    Cancer Father         Pancreatic    High Blood Pressure Father     Diabetes Other     Cancer Other        SOCIAL HISTORY    Social History     Tobacco Use    Smoking status: Former Smoker     Last attempt to quit: 2014     Years since quittin.4    Smokeless tobacco: Never Used   Substance Use Topics    Alcohol use: Yes    Drug use: Not on file       ALLERGIES    No Known Allergies    MEDICATIONS    Current Outpatient Medications on File Prior to Encounter   Medication Sig Dispense Refill    HYDROcodone-acetaminophen (NORCO) 5-325 MG per tablet Take 1 tablet by mouth every 6 hours as needed for Pain.  econazole nitrate 1 % cream Apply topically 2 times daily 1 Tube 3    sertraline (ZOLOFT) 50 MG tablet Take 1 tablet by mouth daily 30 tablet 11    albuterol sulfate HFA (PROAIR HFA) 108 (90 Base) MCG/ACT inhaler Inhale 2 puffs into the lungs every 6 hours as needed for Wheezing 1 Inhaler 3    lidocaine (LIDODERM) 5 % Place 1 patch onto the skin daily 12 hours on, 12 hours off. 30 patch 3    diclofenac (VOLTAREN) 75 MG EC tablet Take 1 tablet by mouth 2 times daily 60 tablet 3    triamterene-hydrochlorothiazide (DYAZIDE) 37.5-25 MG per capsule Take 1 capsule by mouth daily 30 capsule 3    furosemide (LASIX) 20 MG tablet Take 1 tablet by mouth daily 30 tablet 5     No current facility-administered medications on file prior to encounter. REVIEW OF SYSTEMS    Pertinent items are noted in HPI.     Objective:      Pulse 78   Temp 97.1 °F (36.2 °C) (Temporal)   Resp 20     Wt Readings from Last 3 Encounters:   19 (!) 486 lb (220.4 kg)   19 (!) odor  *Uncontrolled swelling            *Need for compression bandage changes due to slippage, breakthrough drainage     PLEASE NOTE: IF YOU ARE UNABLE TO OBTAIN WOUND SUPPLIES, CONTINUE TO USE THE SUPPLIES YOU HAVE AVAILABLE UNTIL YOU ARE ABLE TO REACH US.  IT IS MOST IMPORTANT TO KEEP THE WOUND COVERED AT ALL TIMES   Electronically signed by Devendra Gutierrez DPM on 7/12/2019 at 11:09 AM            Electronically signed by Devendra Gutierrez DPM on 7/12/2019 at 11:12 AM

## 2019-07-30 ENCOUNTER — HOSPITAL ENCOUNTER (OUTPATIENT)
Dept: WOUND CARE | Age: 54
Discharge: HOME | End: 2019-07-30
Payer: COMMERCIAL

## 2019-07-30 VITALS
HEART RATE: 72 BPM | RESPIRATION RATE: 20 BRPM | DIASTOLIC BLOOD PRESSURE: 62 MMHG | TEMPERATURE: 96.9 F | SYSTOLIC BLOOD PRESSURE: 124 MMHG

## 2019-07-30 PROCEDURE — 99213 OFFICE O/P EST LOW 20 MIN: CPT

## 2019-07-30 NOTE — PROGRESS NOTES
Adrianna Garcia 37                                                   Progress Note and Procedure Note      [address] RECORD NUMBER:  [id number]  AGE: 48 y.o. GENDER: male  : 1965  EPISODE DATE:  2019    Subjective:     Chief Complaint   Patient presents with    Wound Check     right lower leg wound         HISTORY of PRESENT ILLNESS HPI     Armando Shah is a 48 y.o. male who presents today for wound/ulcer evaluation. History of Wound Context: Full-thickness venous ulceration right lower leg is is slightly shallower and more narrow then previous visit.  .Wound/Ulcer Pain Timing/Severity: none  Quality of pain: N/A  Severity:  0 / 10   Modifying Factors: None  Associated Signs/Symptoms: edema    Ulcer Identification:  Ulcer Type: venous  Contributing Factors: venous stasis and obesity    Wound: N/A        PAST MEDICAL HISTORY        Diagnosis Date    Abnormal EKG 2016    Abrasion 2014    Atypical chest pain 2013    Class 3 obesity with serious comorbidity and body mass index (BMI) of 60.0 to 69.9 in adult 2018    Cold sore 8/3/2015    Corneal abrasion     Edema 2014    Edema leg     lower leg    ETD (eustachian tube dysfunction) 2013    Hemorrhoids 2015    Hyperglycemia 2016    Knee pain, right     Lateral epicondylitis, R 2013    LBP (low back pain) 2014    Left otitis media 2013    Leg length discrepancy     Leg wound, right 2014    Osteoarthritis     Other and unspecified hyperlipidemia 8/15/2016    Pes planus     Pleuritis 2013    Right knee pain 8/15/2012    Rotator cuff tendinitis     Spondylarthrosis 2012    Thoracolumbar back pain 2012    Thoracolumbar back pain 2012    Tingling in extremities, LUE 2018    Tobacco abuse 2013    Tobacco abuse     Unspecified sleep apnea     Unstable knee 2015    Varicosities     Weight loss 2014       PAST SURGICAL HISTORY    Past Surgical History:   Procedure Laterality Date    COLONOSCOPY  14    POLYPECTOMY [place]    FASCIOTOMY Right 2006    triple fasciotomy right leg    TONSILLECTOMY         FAMILY HISTORY    Family History   Problem Relation Age of Onset    Diabetes Mother     Kidney Disease Mother     Cancer Mother         Lung    Cancer Father         Pancreatic    High Blood Pressure Father     Diabetes Other     Cancer Other        SOCIAL HISTORY    Social History     Tobacco Use    Smoking status: Former Smoker     Last attempt to quit: 2014     Years since quittin.4    Smokeless tobacco: Never Used   Substance Use Topics    Alcohol use: Yes    Drug use: Not on file       ALLERGIES    No Known Allergies    MEDICATIONS    Current Outpatient Medications on File Prior to Encounter   Medication Sig Dispense Refill    HYDROcodone-acetaminophen (NORCO) 5-325 MG per tablet Take 1 tablet by mouth every 6 hours as needed for Pain.  econazole nitrate 1 % cream Apply topically 2 times daily 1 Tube 3    sertraline (ZOLOFT) 50 MG tablet Take 1 tablet by mouth daily 30 tablet 11    albuterol sulfate HFA (PROAIR HFA) 108 (90 Base) MCG/ACT inhaler Inhale 2 puffs into the lungs every 6 hours as needed for Wheezing 1 Inhaler 3    lidocaine (LIDODERM) 5 % Place 1 patch onto the skin daily 12 hours on, 12 hours off. 30 patch 3    triamterene-hydrochlorothiazide (DYAZIDE) 37.5-25 MG per capsule Take 1 capsule by mouth daily 30 capsule 3    furosemide (LASIX) 20 MG tablet Take 1 tablet by mouth daily 30 tablet 5    diclofenac (VOLTAREN) 75 MG EC tablet Take 1 tablet by mouth 2 times daily 60 tablet 3     No current facility-administered medications on file prior to encounter. REVIEW OF SYSTEMS    Pertinent items are noted in HPI.     Objective:      /62   Pulse 72   Temp 96.9 °F (36.1 °C) (Temporal)   Resp 20     Wt Readings from Last 3 Encounters:   19 (!) 484 lb (219.5 kg) Left otitis media H66.92    Low back pain M54.5    Leg wound, right S81.801A    Abrasion T14. 8XXA    Leg wound, right S81.801A    Edema R60.9    Weight loss R63.4    Hemorrhoids K64.9    Unstable knee M25.369    Cold sore B00.1    Anal fissure K60.2    Other and unspecified hyperlipidemia E78.5    Abnormal EKG R94.31    Hyperglycemia R73.9    Synovitis and tenosynovitis, unspecified M65.9    Class 3 obesity with serious comorbidity and body mass index (BMI) of 60.0 to 69.9 in adult [id number]    Tingling in extremities, LUE R20.2    Non-pressure chronic ulcer of other part of right lower leg with fat layer exposed ([address] L97.812    Venous insufficiency (chronic) (peripheral) I87.2        Procedure Note  Indications:  Based on my examination of this patient's wound(s)/ulcer(s) today, debridement is not required to promote healing and evaluate the wound base. Wound 05/24/18 Leg Right; Lower; Lateral #1 (Active)   Wound Image   7/30/2019  9:08 AM   Wound Venous 7/30/2019  9:08 AM   Dressing/Treatment [place];Dry Dressing 7/12/2019 11:20 AM   Wound Cleansed Rinsed/Irrigated with saline 7/30/2019  9:08 AM   Wound Length (cm) 2.5 cm 7/30/2019  9:08 AM   Wound Width (cm) 0.9 cm 7/30/2019  9:08 AM   Wound Depth (cm) 0.2 cm 7/30/2019  9:08 AM   Wound Surface Area (cm^2) 2.25 cm^2 7/30/2019  9:08 AM   Change in Wound Size % (l*w) 28.57 7/30/2019  9:08 AM   Wound Volume (cm^3) 0.45 cm^3 7/30/2019  9:08 AM   Wound Healing % 29 7/30/2019  9:08 AM   Post-Procedure Length (cm) 2.7 cm 5/17/2019  9:40 AM   Post-Procedure Width (cm) 1.3 cm 5/17/2019  9:40 AM   Post-Procedure Depth (cm) 0.2 cm 5/17/2019  9:40 AM   Post-Procedure Surface Area (cm^2) 3.51 cm^2 5/17/2019  9:40 AM   Post-Procedure Volume (cm^3) 0.7 cm^3 5/17/2019  9:40 AM   Drainage Amount Small 7/30/2019  9:08 AM   Drainage Description Serous; Serosanguinous 7/30/2019  9:08 AM   Odor None 7/30/2019  9:08 AM   Margins Defined edges 7/30/2019  9:08 AM Yue-wound Assessment Dry 7/30/2019  9:08 AM   Non-staged Wound Description Full thickness 7/30/2019  9:08 AM   [place]%Wound Bed 100 7/30/2019  9:08 AM   Red%Wound Bed 50 6/25/2019  8:50 AM   Yellow%Wound Bed 5 6/11/2019  2:29 PM   Other%Wound Bed 10 white 7/2/2019 10:43 AM   Number of days: 432       PLAN  Change dressing as directed . Follow up on Friday. Note dispensed to resume work on Monday. Elevate legs and resume lymphedema pumps  Watch salt intake and elevate legs  Treatment Note please see attached Discharge Instructions    I  Written patient dismissal instructions given to patient and signed by patient or POA. Discharge Instructions       [facility] Orders and Discharge Instructions  [address]  Telephone: [phone]      [phone]        NAME: Angelica Swanson OF BIRTH:  1965  MEDICAL RECORD NUMBER:  [id number]     Home Care/Facility:  NONE     Wound Location:  RIGHT LATERAL LEG     Dressing orders: 1. Cleanse wound(s) with normal saline. 2. Apply SORBACT WOUND CONTACT LAYER THEN CALCIUM ALGINATE to wound bed. 3. Cover with 4x4's and wrap with gauze (amy or kerlix)  4. Change  Every other day or Monday, Wednesday, and Friday     Compression:  Apply 10-20mmHg compression hose to  RIGHT lower leg(s), may remove at bedtime, reapply first thing in the morning, avoid prolonged standing, elevate legs when sitting.     Offloading Device:  NONE     Other Instructions:  elevate legs as much as possible, USE LYMPHEDEMA PUMPS FOR ONE HOUR ONE TIME A DAY.     Keep all dressings clean, dry and intact.  Keep pressure off the wound(s) at all times.      Follow up visit   2 WEEKS AUGUST 13, 2019 AT     Please give 24 hour notice if unable to keep appointment.  [phone]     If you experience any of the following, please call the Wound Care Service at  [phone] or go to the nearest

## 2019-08-13 ENCOUNTER — HOSPITAL ENCOUNTER (OUTPATIENT)
Dept: WOUND CARE | Age: 54
Discharge: HOME | End: 2019-08-13
Payer: COMMERCIAL

## 2019-08-13 VITALS
RESPIRATION RATE: 20 BRPM | WEIGHT: 315 LBS | HEIGHT: 69 IN | BODY MASS INDEX: 46.65 KG/M2 | TEMPERATURE: 97.2 F | DIASTOLIC BLOOD PRESSURE: 71 MMHG | SYSTOLIC BLOOD PRESSURE: 136 MMHG | HEART RATE: 78 BPM

## 2019-08-13 PROCEDURE — 11042 DBRDMT SUBQ TIS 1ST 20SQCM/<: CPT

## 2019-08-13 NOTE — PROGRESS NOTES
HISTORY    Past Surgical History:   Procedure Laterality Date    COLONOSCOPY  14    POLYPECTOMY Derara Julians    FASCIOTOMY Right 2006    triple fasciotomy right leg    TONSILLECTOMY         FAMILY HISTORY    Family History   Problem Relation Age of Onset    Diabetes Mother     Kidney Disease Mother     Cancer Mother         Lung    Cancer Father         Pancreatic    High Blood Pressure Father     Diabetes Other     Cancer Other        SOCIAL HISTORY    Social History     Tobacco Use    Smoking status: Former Smoker     Last attempt to quit: 2014     Years since quittin.5    Smokeless tobacco: Never Used   Substance Use Topics    Alcohol use: Yes    Drug use: Not on file       ALLERGIES    No Known Allergies    MEDICATIONS    Current Outpatient Medications on File Prior to Encounter   Medication Sig Dispense Refill    HYDROcodone-acetaminophen (NORCO) 5-325 MG per tablet Take 1 tablet by mouth every 6 hours as needed for Pain.  econazole nitrate 1 % cream Apply topically 2 times daily 1 Tube 3    sertraline (ZOLOFT) 50 MG tablet Take 1 tablet by mouth daily 30 tablet 11    albuterol sulfate HFA (PROAIR HFA) 108 (90 Base) MCG/ACT inhaler Inhale 2 puffs into the lungs every 6 hours as needed for Wheezing 1 Inhaler 3    lidocaine (LIDODERM) 5 % Place 1 patch onto the skin daily 12 hours on, 12 hours off. 30 patch 3    triamterene-hydrochlorothiazide (DYAZIDE) 37.5-25 MG per capsule Take 1 capsule by mouth daily 30 capsule 3    furosemide (LASIX) 20 MG tablet Take 1 tablet by mouth daily 30 tablet 5    diclofenac (VOLTAREN) 75 MG EC tablet Take 1 tablet by mouth 2 times daily 60 tablet 3     No current facility-administered medications on file prior to encounter. REVIEW OF SYSTEMS    Pertinent items are noted in HPI.     Objective:      /71   Pulse 78   Temp 97.2 °F (36.2 °C) (Temporal)   Resp 20   Ht 5' 9\" (1.753 m)   Wt (!) 484 lb (219.5 kg)   BMI 71.47 kg/m²

## 2019-08-27 ENCOUNTER — HOSPITAL ENCOUNTER (OUTPATIENT)
Dept: WOUND CARE | Age: 54
Discharge: HOME | End: 2019-08-27
Payer: COMMERCIAL

## 2019-08-27 PROCEDURE — 99213 OFFICE O/P EST LOW 20 MIN: CPT

## 2019-08-27 NOTE — PROGRESS NOTES
Adrianna Garcia 37                                                   Progress Note and Procedure Note      [address] RECORD NUMBER:  [id number]  AGE: 48 y.o. GENDER: male  : 1965  EPISODE DATE:  2019    Subjective:     Chief Complaint   Patient presents with    Wound Check     right lower leg         HISTORY of PRESENT ILLNESS HPI     Laura Wilson is a 48 y.o. male who presents today for wound/ulcer evaluation. History of Wound Context: Non-healing venous stasis ulcer of the right leg. Denies any fevers, chills, nausea or vomiting.    Wound/Ulcer Pain Timing/Severity: intermittent  Quality of pain: aching  Severity:  3  10   Modifying Factors: None  Associated Signs/Symptoms: none    Ulcer Identification:  Ulcer Type: venous  Contributing Factors: edema, venous stasis and obesity    Wound: N/A        PAST MEDICAL HISTORY        Diagnosis Date    Abnormal EKG 2016    Abrasion 2014    Atypical chest pain 2013    Class 3 obesity with serious comorbidity and body mass index (BMI) of 60.0 to 69.9 in adult 2018    Cold sore 8/3/2015    Corneal abrasion     Edema 2014    Edema leg     lower leg    ETD (eustachian tube dysfunction) 2013    Hemorrhoids 2015    Hyperglycemia 2016    Knee pain, right     Lateral epicondylitis, R 2013    LBP (low back pain) 2014    Left otitis media 2013    Leg length discrepancy     Leg wound, right 2014    Osteoarthritis     Other and unspecified hyperlipidemia 8/15/2016    Pes planus     Pleuritis 2013    Right knee pain 8/15/2012    Rotator cuff tendinitis     Spondylarthrosis 2012    Thoracolumbar back pain 2012    Thoracolumbar back pain 2012    Tingling in extremities, LUE 2018    Tobacco abuse 2013    Tobacco abuse     Unspecified sleep apnea     Unstable knee 2015    Varicosities     Weight loss 2014       PAST SURGICAL instructions given to patient and signed by patient or POA. Discharge Instructions       [facility] Orders and Discharge Instructions  [address], [address] [place] [address]  Telephone: [phone]      [phone]        NAME: Alayna Cheema OF BIRTH:  1965  MEDICAL RECORD NUMBER:  [id number]     Home Care/Facility:  NONE     Wound Location:  RIGHT LATERAL LEG     Dressing orders: 1. Cleanse wound(s) with normal saline. 2. Apply SORBACT WOUND CONTACT LAYER THEN CALCIUM ALGINATE to wound bed. 3. Cover with 4x4's and wrap with gauze (amy or kerlix)  4. Change  Every other day or Monday, Wednesday, and Friday     TODAY IN WOUND CENTER APPLY SALINE MOIST PROMOGRAN, SORBACT WOUND CONTACT LAYER, CALCIUM ALGINATE AND DRY DRESSING.     Compression:  Apply 10-20mmHg compression hose to  RIGHT lower leg(s), may remove at bedtime, reapply first thing in the morning, avoid prolonged standing, elevate legs when sitting.     Offloading Device:  NONE     Other Instructions:  elevate legs as much as possible, USE LYMPHEDEMA PUMPS FOR ONE HOUR ONE TIME A DAY.     Keep all dressings clean, dry and intact.  Keep pressure off the wound(s) at all times.      Follow up visit  September 10, 2019 AT     Please give 24 hour notice if unable to keep appointment. [phone]     If you experience any of the following, please call the Wound Care Service at  [phone] or go to the nearest emergency room.   *Increase in pain         *Temperature over 101           *Increase in drainage from your wound or a foul odor  *Uncontrolled swelling            *Need for compression bandage changes due to slippage, breakthrough drainage     PLEASE NOTE: IF YOU ARE UNABLE TO OBTAIN WOUND SUPPLIES, CONTINUE TO USE THE SUPPLIES YOU HAVE AVAILABLE UNTIL YOU ARE ABLE TO [facility].  IT IS MOST IMPORTANT TO KEEP THE WOUND COVERED AT [facility]   Electronically

## 2019-09-10 ENCOUNTER — HOSPITAL ENCOUNTER (OUTPATIENT)
Dept: WOUND CARE | Age: 54
Discharge: HOME | End: 2019-09-10
Payer: COMMERCIAL

## 2019-09-10 VITALS
SYSTOLIC BLOOD PRESSURE: 134 MMHG | RESPIRATION RATE: 20 BRPM | DIASTOLIC BLOOD PRESSURE: 74 MMHG | HEART RATE: 73 BPM | TEMPERATURE: 97.6 F

## 2019-09-10 PROCEDURE — 11042 DBRDMT SUBQ TIS 1ST 20SQCM/<: CPT

## 2019-09-10 ASSESSMENT — PAIN SCALES - GENERAL: PAINLEVEL_OUTOF10: 2

## 2019-09-10 ASSESSMENT — PAIN DESCRIPTION - LOCATION: LOCATION: LEG

## 2019-09-10 ASSESSMENT — PAIN DESCRIPTION - FREQUENCY: FREQUENCY: INTERMITTENT

## 2019-09-10 ASSESSMENT — PAIN DESCRIPTION - DESCRIPTORS: DESCRIPTORS: OTHER (COMMENT)

## 2019-09-10 ASSESSMENT — PAIN DESCRIPTION - ONSET: ONSET: GRADUAL

## 2019-09-10 ASSESSMENT — PAIN DESCRIPTION - ORIENTATION: ORIENTATION: RIGHT

## 2019-09-10 ASSESSMENT — PAIN DESCRIPTION - PAIN TYPE: TYPE: ACUTE PAIN

## 2019-09-10 NOTE — PROGRESS NOTES
SURGICAL HISTORY    Past Surgical History:   Procedure Laterality Date    COLONOSCOPY  14    POLYPECTOMY Bette Risk    FASCIOTOMY Right 2006    triple fasciotomy right leg    TONSILLECTOMY         FAMILY HISTORY    Family History   Problem Relation Age of Onset    Diabetes Mother     Kidney Disease Mother     Cancer Mother         Lung    Cancer Father         Pancreatic    High Blood Pressure Father     Diabetes Other     Cancer Other        SOCIAL HISTORY    Social History     Tobacco Use    Smoking status: Former Smoker     Last attempt to quit: 2014     Years since quittin.6    Smokeless tobacco: Never Used   Substance Use Topics    Alcohol use: Yes    Drug use: Not on file       ALLERGIES    No Known Allergies    MEDICATIONS    Current Outpatient Medications on File Prior to Encounter   Medication Sig Dispense Refill    HYDROcodone-acetaminophen (NORCO) 5-325 MG per tablet Take 1 tablet by mouth every 6 hours as needed for Pain.  econazole nitrate 1 % cream Apply topically 2 times daily 1 Tube 3    sertraline (ZOLOFT) 50 MG tablet Take 1 tablet by mouth daily 30 tablet 11    albuterol sulfate HFA (PROAIR HFA) 108 (90 Base) MCG/ACT inhaler Inhale 2 puffs into the lungs every 6 hours as needed for Wheezing 1 Inhaler 3    lidocaine (LIDODERM) 5 % Place 1 patch onto the skin daily 12 hours on, 12 hours off. 30 patch 3    triamterene-hydrochlorothiazide (DYAZIDE) 37.5-25 MG per capsule Take 1 capsule by mouth daily 30 capsule 3    furosemide (LASIX) 20 MG tablet Take 1 tablet by mouth daily 30 tablet 5    diclofenac (VOLTAREN) 75 MG EC tablet Take 1 tablet by mouth 2 times daily 60 tablet 3     No current facility-administered medications on file prior to encounter. REVIEW OF SYSTEMS    Pertinent items are noted in HPI.     Objective:      /74   Pulse 73   Temp 97.6 °F (36.4 °C) (Temporal)   Resp 20     Wt Readings from Last 3 Encounters:   19 (!) 484 lb

## 2019-09-24 ENCOUNTER — HOSPITAL ENCOUNTER (OUTPATIENT)
Dept: WOUND CARE | Age: 54
Discharge: HOME | End: 2019-09-24
Payer: COMMERCIAL

## 2019-09-24 VITALS
RESPIRATION RATE: 20 BRPM | TEMPERATURE: 97.3 F | DIASTOLIC BLOOD PRESSURE: 69 MMHG | SYSTOLIC BLOOD PRESSURE: 146 MMHG | HEART RATE: 78 BPM

## 2019-09-24 PROCEDURE — 11042 DBRDMT SUBQ TIS 1ST 20SQCM/<: CPT

## 2019-09-24 NOTE — PROGRESS NOTES
None 9/24/2019  1:43 PM   Margins Defined edges 9/24/2019  1:43 PM   Yue-wound Assessment Dry; Intact 9/24/2019  1:43 PM   Non-staged Wound Description Full thickness 9/24/2019  1:43 PM   [place]%Wound Bed 100 9/24/2019  1:43 PM   Red%Wound Bed 50 8/27/2019 10:30 AM   Yellow%Wound Bed 5 6/11/2019  2:29 PM   Other%Wound Bed 10 white 7/2/2019 10:43 AM   Number of days: 488              Percent of Wound/Ulcer Debrided: 100%    Total Surface Area Debrided:  1.38 sq cm     Diabetic/Pressure/Non Pressure Ulcers:  Ulcer: Non-Pressure ulcer, fat layer exposed      Bleeding:  Minimal    Hemostasis Achieved:  by pressure    Procedural Pain:  1  / 10     Post Procedural Pain:  0 / 10     Response to treatment:  Well tolerated by patient. Plan:   Encouraged weight loss  Continue current treatment plan  Return to clinic in 2 weeks      Treatment Note please see attached Discharge Instructions    Written patient dismissal instructions given to patient and signed by patient or POA. Discharge Instructions       [address] [place] [address] Orders and Discharge Instructions  [address]  Telephone: [phone]      [phone]        NAME: Melly Oconnor OF BIRTH:  1965  MEDICAL RECORD NUMBER:  [id number]     Home Care/Facility:  NONE     Wound Location:  RIGHT LATERAL LEG     Dressing orders: 1. Cleanse wound(s) with normal saline. 2. Apply PROMOGRAN, SORBACT WOUND CONTACT LAYER THEN CALCIUM ALGINATE to wound bed. 3. Cover with 4x4's and wrap with gauze (amy or kerlix)  4.  Change  Every other day or Monday, Wednesday, and Friday     Compression:  Apply 10-20mmHg compression hose to  RIGHT lower leg(s), may remove at bedtime, reapply first thing in the morning, avoid prolonged standing, elevate legs when sitting.     Offloading Device:  NONE     Other Instructions:  elevate legs as much as possible, USE LYMPHEDEMA PUMPS FOR ONE HOUR ONE TIME A DAY.     Keep all dressings clean, dry and intact.  Keep pressure off the wound(s) at all times.      Follow up visit  2 WEEKS  October 8, 2019 AT     Please give 24 hour notice if unable to keep appointment. [phone]     If you experience any of the following, please call the Wound Care Service at  [phone] or go to the nearest emergency room.   *Increase in pain         *Temperature over 101           *Increase in drainage from your wound or a foul odor  *Uncontrolled swelling            *Need for compression bandage changes due to slippage, breakthrough drainage     PLEASE NOTE: IF YOU ARE UNABLE TO OBTAIN WOUND SUPPLIES, CONTINUE TO USE THE SUPPLIES YOU HAVE AVAILABLE UNTIL YOU ARE ABLE TO [facility].  IT IS MOST IMPORTANT TO KEEP THE WOUND COVERED AT ALL TIMES   Electronically signed by Meseret Calabrese DPM on 9/24/2019 at 2:37 PM          Electronically signed by Meseret Calabrese DPM on 9/24/2019 at 2:40 PM

## 2019-10-08 ENCOUNTER — HOSPITAL ENCOUNTER (OUTPATIENT)
Dept: WOUND CARE | Age: 54
Discharge: HOME | End: 2019-10-08
Payer: COMMERCIAL

## 2019-10-08 VITALS
TEMPERATURE: 97.7 F | RESPIRATION RATE: 20 BRPM | HEART RATE: 76 BPM | DIASTOLIC BLOOD PRESSURE: 80 MMHG | SYSTOLIC BLOOD PRESSURE: 162 MMHG

## 2019-10-08 PROCEDURE — 11042 DBRDMT SUBQ TIS 1ST 20SQCM/<: CPT

## 2019-10-08 RX ORDER — MONTELUKAST SODIUM 10 MG/1
10 TABLET ORAL NIGHTLY
COMMUNITY

## 2019-10-08 RX ORDER — PREDNISONE 10 MG/1
10 TABLET ORAL DAILY
COMMUNITY
End: 2019-11-12 | Stop reason: ALTCHOICE

## 2019-10-08 RX ORDER — CEFDINIR 300 MG/1
300 CAPSULE ORAL 2 TIMES DAILY
COMMUNITY
End: 2019-11-12 | Stop reason: ALTCHOICE

## 2019-10-22 ENCOUNTER — HOSPITAL ENCOUNTER (OUTPATIENT)
Dept: WOUND CARE | Age: 54
Discharge: HOME | End: 2019-10-22
Payer: COMMERCIAL

## 2019-10-22 VITALS
SYSTOLIC BLOOD PRESSURE: 147 MMHG | DIASTOLIC BLOOD PRESSURE: 70 MMHG | RESPIRATION RATE: 20 BRPM | HEART RATE: 71 BPM | TEMPERATURE: 97.6 F

## 2019-10-22 PROCEDURE — 99213 OFFICE O/P EST LOW 20 MIN: CPT

## 2019-11-12 ENCOUNTER — HOSPITAL ENCOUNTER (OUTPATIENT)
Dept: WOUND CARE | Age: 54
Discharge: HOME | End: 2019-11-12
Payer: COMMERCIAL

## 2019-11-12 VITALS
TEMPERATURE: 97.1 F | RESPIRATION RATE: 20 BRPM | DIASTOLIC BLOOD PRESSURE: 79 MMHG | SYSTOLIC BLOOD PRESSURE: 149 MMHG | HEART RATE: 72 BPM

## 2019-11-12 PROCEDURE — 99213 OFFICE O/P EST LOW 20 MIN: CPT

## 2019-11-12 RX ORDER — CIPROFLOXACIN 500 MG/1
500 TABLET, FILM COATED ORAL 2 TIMES DAILY
COMMUNITY

## 2019-11-12 ASSESSMENT — PAIN SCALES - GENERAL: PAINLEVEL_OUTOF10: 0

## 2019-12-10 ENCOUNTER — HOSPITAL ENCOUNTER (OUTPATIENT)
Dept: WOUND CARE | Age: 54
Discharge: HOME | End: 2019-12-10
Payer: COMMERCIAL

## 2019-12-10 VITALS
SYSTOLIC BLOOD PRESSURE: 158 MMHG | RESPIRATION RATE: 20 BRPM | DIASTOLIC BLOOD PRESSURE: 97 MMHG | TEMPERATURE: 98.2 F | HEART RATE: 86 BPM

## 2019-12-10 PROCEDURE — 99212 OFFICE O/P EST SF 10 MIN: CPT

## 2019-12-10 PROCEDURE — 99213 OFFICE O/P EST LOW 20 MIN: CPT | Performed by: NURSE PRACTITIONER

## 2021-08-20 ENCOUNTER — OFFICE VISIT (OUTPATIENT)
Dept: PAIN MANAGEMENT | Age: 56
End: 2021-08-20
Payer: COMMERCIAL

## 2021-08-20 VITALS
BODY MASS INDEX: 45.1 KG/M2 | TEMPERATURE: 97.3 F | SYSTOLIC BLOOD PRESSURE: 136 MMHG | WEIGHT: 315 LBS | DIASTOLIC BLOOD PRESSURE: 76 MMHG | HEIGHT: 70 IN

## 2021-08-20 DIAGNOSIS — M51.36 DDD (DEGENERATIVE DISC DISEASE), LUMBAR: ICD-10-CM

## 2021-08-20 DIAGNOSIS — M43.17 SPONDYLOLISTHESIS AT L5-S1 LEVEL: ICD-10-CM

## 2021-08-20 DIAGNOSIS — M47.817 LUMBOSACRAL SPONDYLOSIS WITHOUT MYELOPATHY: Primary | ICD-10-CM

## 2021-08-20 PROCEDURE — 99213 OFFICE O/P EST LOW 20 MIN: CPT | Performed by: ANESTHESIOLOGY

## 2021-08-20 RX ORDER — HYDROCODONE BITARTRATE AND ACETAMINOPHEN 5; 325 MG/1; MG/1
1 TABLET ORAL DAILY
Qty: 30 TABLET | Refills: 0 | Status: SHIPPED | OUTPATIENT
Start: 2021-08-20 | End: 2021-09-19

## 2021-08-20 ASSESSMENT — ENCOUNTER SYMPTOMS
BACK PAIN: 1
NAUSEA: 0
DIARRHEA: 0
SHORTNESS OF BREATH: 0
CONSTIPATION: 0

## 2021-08-20 NOTE — PROGRESS NOTES
Patient:  Shani Cagle  YOB: 1965  Date: 8/20/2021      Subjective:     Shani Cagle is a 50 y.o. male who presents today with:    Chief Complaint   Patient presents with    Back Pain       HPI: The patient presents to the clinic for a follow-up with chief complaint of a chronic mechanical low back pain from lumbar spondylolysis and spondylolisthesis of L5 and degenerative disc disease. The patient weighs 450 pounds with a BMI of 64.57 kg/m². Allergies:  Patient has no known allergies.   Past Medical History:   Diagnosis Date    Abnormal EKG 9/7/2016    Abrasion 8/21/2014    Atypical chest pain 2/18/2013    Class 3 obesity with serious comorbidity and body mass index (BMI) of 60.0 to 69.9 in adult 1/23/2018    Cold sore 8/3/2015    Corneal abrasion     Edema 8/21/2014    Edema leg     lower leg    ETD (eustachian tube dysfunction) 4/17/2013    Hemorrhoids 1/9/2015    Hyperglycemia 9/7/2016    Knee pain, right     Lateral epicondylitis, R 2/18/2013    LBP (low back pain) 4/22/2014    Left otitis media 4/17/2013    Leg length discrepancy     Leg wound, right 8/21/2014    Osteoarthritis     Other and unspecified hyperlipidemia 8/15/2016    Pes planus     Pleuritis 2/18/2013    Right knee pain 8/15/2012    Rotator cuff tendinitis     Spondylarthrosis 5/7/2012    Thoracolumbar back pain 5/7/2012    Thoracolumbar back pain 5/7/2012    Tingling in extremities, LUE 2/2/2018    Tobacco abuse 2/18/2013    Tobacco abuse     Unspecified sleep apnea     Unstable knee 7/8/2015    Varicosities     Weight loss 12/11/2014     Past Surgical History:   Procedure Laterality Date    COLONOSCOPY  9/25/14    POLYPECTOMY Dai [place]    FASCIOTOMY Right 2006    triple fasciotomy right leg    TONSILLECTOMY       Social History     Socioeconomic History    Marital status:      Spouse name: Not on file    Number of children: Not on file    Years of education: Not on file    Highest education level: Not on file   Occupational History    Not on file   Tobacco Use    Smoking status: Former Smoker     Packs/day: 2.00     Years: 28.00     Pack years: 56.00     Types: Cigarettes     Quit date: 2014     Years since quittin.5    Smokeless tobacco: Never Used   Substance and Sexual Activity    Alcohol use: Yes    Drug use: Not on file    Sexual activity: Not on file   Other Topics Concern    Not on file   Social History Narrative    Not on file     Social Determinants of Health     Financial Resource Strain:     Difficulty of Paying Living Expenses:    Food Insecurity:     Worried About Running Out of Food in the Last Year:     [address] in the Last Year:    Transportation Needs:     Lack of Transportation (Medical):      Lack of Transportation (Non-Medical):    Physical Activity:     Days of Exercise per Week:     Minutes of Exercise per Session:    Stress:     Feeling of Stress :    Social Connections:     Frequency of Communication with Friends and Family:     Frequency of Social Gatherings with Friends and Family:     Attends Faith Services:     Active Member of Clubs or Organizations:     Attends Club or Organization Meetings:     Marital Status:    Intimate Partner Violence:     Fear of Current or Ex-Partner:     Emotionally Abused:     Physically Abused:     Sexually Abused:      Family History   Problem Relation Age of Onset    Diabetes Mother     Kidney Disease Mother     Cancer Mother         Lung    Cancer Father         Pancreatic    High Blood Pressure Father     Diabetes Other     Cancer Other        Current Outpatient Medications on File Prior to Visit   Medication Sig Dispense Refill    ciprofloxacin (CIPRO) 500 MG tablet Take 500 mg by mouth 2 times daily      fluticasone (VERAMYST) 27.5 MCG/SPRAY nasal spray 2 sprays by Each Nostril route daily      montelukast (SINGULAIR) 10 MG tablet Take 10 mg by mouth nightly to all perspectives. Radiculopathy:  Negative    Studies Review:  Reviewed None. Assessment:           Diagnosis Orders   1. Lumbosacral spondylosis without myelopathy  HYDROcodone-acetaminophen (NORCO) 5-325 MG per tablet   2. Spondylolisthesis at L5-S1 level  HYDROcodone-acetaminophen (NORCO) 5-325 MG per tablet   3. DDD (degenerative disc disease), lumbar  HYDROcodone-acetaminophen (NORCO) 5-325 MG per tablet         Plan:     No orders of the defined types were placed in this encounter. Orders Placed This Encounter   Medications    HYDROcodone-acetaminophen (NORCO) 5-325 MG per tablet     Sig: Take 1 tablet by mouth daily for 30 days. Dispense:  30 tablet     Refill:  0     Reduce doses taken as pain becomes manageable       Discussed with the patient regarding lumbar MBB/RFA with respect to his high BMI issue. The patient also has some respiratory issue. Discussed that his morbidity and death. The patient understands. He has to be able to stay prone at least for 30 minutes comfortably to be able to go through the procedure. We have not made a decision yet. Follow up:  Return if symptoms worsen or fail to improve.     Roderick Cabral MD

## 2023-05-23 DIAGNOSIS — M47.26 OSTEOARTHRITIS OF SPINE WITH RADICULOPATHY, LUMBAR REGION: Primary | ICD-10-CM

## 2023-05-23 RX ORDER — CYCLOBENZAPRINE HCL 10 MG
10 TABLET ORAL 3 TIMES DAILY PRN
Qty: 90 TABLET | Refills: 1 | Status: SHIPPED | OUTPATIENT
Start: 2023-05-23 | End: 2024-04-10 | Stop reason: SDUPTHER

## 2023-05-23 RX ORDER — CYCLOBENZAPRINE HCL 10 MG
10 TABLET ORAL 3 TIMES DAILY PRN
COMMUNITY
End: 2023-05-23 | Stop reason: SDUPTHER

## 2023-05-23 RX ORDER — HYDROCODONE BITARTRATE AND ACETAMINOPHEN 5; 325 MG/1; MG/1
TABLET ORAL 4 TIMES DAILY
COMMUNITY
Start: 2021-08-20 | End: 2024-04-09 | Stop reason: ALTCHOICE

## 2023-05-23 NOTE — TELEPHONE ENCOUNTER
Dr Perry [facility] called requesting refills     FLEXERIL 10MG  VICODIN 7.5     [facility] [place]

## 2023-08-08 DIAGNOSIS — J82.83 EOSINOPHILIC ASTHMA (HHS-HCC): ICD-10-CM

## 2023-08-08 RX ORDER — ALBUTEROL SULFATE 90 UG/1
2 AEROSOL, METERED RESPIRATORY (INHALATION) EVERY 6 HOURS PRN
Qty: 8.5 G | Refills: 5 | Status: SHIPPED | OUTPATIENT
Start: 2023-08-08 | End: 2024-04-10 | Stop reason: SDUPTHER

## 2023-08-08 RX ORDER — ALBUTEROL SULFATE 0.83 MG/ML
SOLUTION RESPIRATORY (INHALATION) EVERY 4 HOURS PRN
Qty: 90 ML | Refills: 5 | Status: SHIPPED | OUTPATIENT
Start: 2023-08-08 | End: 2024-04-09 | Stop reason: ALTCHOICE

## 2024-02-12 DIAGNOSIS — F43.9 STRESS: ICD-10-CM

## 2024-02-12 DIAGNOSIS — F41.9 ANXIETY: ICD-10-CM

## 2024-02-12 RX ORDER — DIAZEPAM 5 MG/1
5 TABLET ORAL EVERY 8 HOURS PRN
Qty: 20 TABLET | Refills: 0 | Status: SHIPPED | OUTPATIENT
Start: 2024-02-12 | End: 2024-04-09 | Stop reason: WASHOUT

## 2024-02-28 DIAGNOSIS — L30.9 ECZEMA, UNSPECIFIED TYPE: Primary | ICD-10-CM

## 2024-02-29 RX ORDER — DESOXIMETASONE 2.5 MG/G
CREAM TOPICAL 2 TIMES DAILY
Qty: 60 G | Refills: 1 | Status: SHIPPED | OUTPATIENT
Start: 2024-02-29 | End: 2024-05-08 | Stop reason: SDUPTHER

## 2024-04-09 ENCOUNTER — OFFICE VISIT (OUTPATIENT)
Dept: PRIMARY CARE | Facility: CLINIC | Age: 59
End: 2024-04-09
Payer: COMMERCIAL

## 2024-04-09 VITALS
DIASTOLIC BLOOD PRESSURE: 74 MMHG | WEIGHT: 315 LBS | HEIGHT: 70 IN | OXYGEN SATURATION: 97 % | BODY MASS INDEX: 45.1 KG/M2 | HEART RATE: 82 BPM | RESPIRATION RATE: 16 BRPM | SYSTOLIC BLOOD PRESSURE: 128 MMHG

## 2024-04-09 DIAGNOSIS — S32.020D COMPRESSION FRACTURE OF L2 VERTEBRA WITH ROUTINE HEALING, SUBSEQUENT ENCOUNTER: ICD-10-CM

## 2024-04-09 DIAGNOSIS — E78.2 MIXED HYPERLIPIDEMIA: ICD-10-CM

## 2024-04-09 DIAGNOSIS — Z12.5 PROSTATE CANCER SCREENING: ICD-10-CM

## 2024-04-09 DIAGNOSIS — R53.83 FATIGUE, UNSPECIFIED TYPE: ICD-10-CM

## 2024-04-09 DIAGNOSIS — E11.65 TYPE 2 DIABETES MELLITUS WITH HYPERGLYCEMIA, WITHOUT LONG-TERM CURRENT USE OF INSULIN (MULTI): ICD-10-CM

## 2024-04-09 DIAGNOSIS — J82.83 EOSINOPHILIC ASTHMA (HHS-HCC): Primary | ICD-10-CM

## 2024-04-09 DIAGNOSIS — M47.26 OSTEOARTHRITIS OF SPINE WITH RADICULOPATHY, LUMBAR REGION: ICD-10-CM

## 2024-04-09 DIAGNOSIS — J30.1 ALLERGIC RHINITIS DUE TO POLLEN, UNSPECIFIED SEASONALITY: ICD-10-CM

## 2024-04-09 DIAGNOSIS — S32.020A COMPRESSION FRACTURE OF L2 VERTEBRA, INITIAL ENCOUNTER (MULTI): ICD-10-CM

## 2024-04-09 DIAGNOSIS — E66.01 CLASS 3 SEVERE OBESITY DUE TO EXCESS CALORIES WITH SERIOUS COMORBIDITY AND BODY MASS INDEX (BMI) OF 50.0 TO 59.9 IN ADULT (MULTI): ICD-10-CM

## 2024-04-09 PROBLEM — J30.9 ALLERGIC RHINITIS: Status: ACTIVE | Noted: 2024-04-09

## 2024-04-09 PROBLEM — Z86.010 HISTORY OF COLON POLYPS: Status: ACTIVE | Noted: 2024-04-09

## 2024-04-09 PROBLEM — M17.0 OSTEOARTHRITIS OF KNEES, BILATERAL: Status: ACTIVE | Noted: 2024-04-09

## 2024-04-09 PROBLEM — L97.812 NON-PRESSURE CHRONIC ULCER OF OTHER PART OF RIGHT LOWER LEG WITH FAT LAYER EXPOSED (MULTI): Status: RESOLVED | Noted: 2018-05-24 | Resolved: 2024-04-09

## 2024-04-09 PROBLEM — R19.7 DIARRHEA: Status: ACTIVE | Noted: 2024-04-09

## 2024-04-09 PROBLEM — E11.9 TYPE 2 DIABETES MELLITUS (MULTI): Status: ACTIVE | Noted: 2024-04-09

## 2024-04-09 PROBLEM — G47.30 SLEEP APNEA: Status: ACTIVE | Noted: 2024-04-09

## 2024-04-09 PROBLEM — R06.02 SHORTNESS OF BREATH AT REST: Status: ACTIVE | Noted: 2024-04-09

## 2024-04-09 PROBLEM — Z86.0100 HISTORY OF COLON POLYPS: Status: ACTIVE | Noted: 2024-04-09

## 2024-04-09 PROBLEM — I87.2 VENOUS INSUFFICIENCY (CHRONIC) (PERIPHERAL): Status: ACTIVE | Noted: 2018-05-24

## 2024-04-09 PROBLEM — L02.214 ABSCESS OF GROIN: Status: ACTIVE | Noted: 2024-04-09

## 2024-04-09 PROBLEM — R25.1 TREMOR OF BOTH HANDS: Status: ACTIVE | Noted: 2024-04-09

## 2024-04-09 PROBLEM — L30.9 ECZEMA: Status: ACTIVE | Noted: 2024-04-09

## 2024-04-09 PROBLEM — L03.90 CELLULITIS: Status: ACTIVE | Noted: 2024-04-09

## 2024-04-09 PROBLEM — E55.9 VITAMIN D DEFICIENCY: Status: ACTIVE | Noted: 2024-04-09

## 2024-04-09 PROBLEM — R06.2 WHEEZING: Status: ACTIVE | Noted: 2024-04-09

## 2024-04-09 PROBLEM — M62.838 MUSCLE SPASM: Status: ACTIVE | Noted: 2024-04-09

## 2024-04-09 PROBLEM — J34.2 NASAL SEPTAL DEVIATION: Status: ACTIVE | Noted: 2024-04-09

## 2024-04-09 PROCEDURE — 3078F DIAST BP <80 MM HG: CPT | Performed by: FAMILY MEDICINE

## 2024-04-09 PROCEDURE — 3074F SYST BP LT 130 MM HG: CPT | Performed by: FAMILY MEDICINE

## 2024-04-09 PROCEDURE — 1036F TOBACCO NON-USER: CPT | Performed by: FAMILY MEDICINE

## 2024-04-09 PROCEDURE — 99214 OFFICE O/P EST MOD 30 MIN: CPT | Performed by: FAMILY MEDICINE

## 2024-04-09 PROCEDURE — 3008F BODY MASS INDEX DOCD: CPT | Performed by: FAMILY MEDICINE

## 2024-04-09 RX ORDER — BLOOD-GLUCOSE SENSOR
EACH MISCELLANEOUS
Qty: 1 EACH | Refills: 10 | Status: SHIPPED | OUTPATIENT
Start: 2024-04-09 | End: 2024-04-22

## 2024-04-09 RX ORDER — FLUTICASONE PROPIONATE 50 MCG
1 SPRAY, SUSPENSION (ML) NASAL DAILY
COMMUNITY
Start: 2020-02-17 | End: 2024-04-10 | Stop reason: SDUPTHER

## 2024-04-09 RX ORDER — ALBUTEROL SULFATE 0.83 MG/ML
2.5 SOLUTION RESPIRATORY (INHALATION) EVERY 4 HOURS PRN
Qty: 90 ML | Refills: 5 | Status: SHIPPED | OUTPATIENT
Start: 2024-04-09

## 2024-04-09 RX ORDER — BLOOD-GLUCOSE,RECEIVER,CONT
EACH MISCELLANEOUS
Qty: 1 EACH | Refills: 0 | Status: SHIPPED | OUTPATIENT
Start: 2024-04-09 | End: 2024-04-22

## 2024-04-09 RX ORDER — IBUPROFEN 800 MG/1
800 TABLET ORAL 3 TIMES DAILY PRN
Qty: 100 TABLET | Refills: 2 | Status: SHIPPED | OUTPATIENT
Start: 2024-04-09 | End: 2024-07-18

## 2024-04-09 ASSESSMENT — ENCOUNTER SYMPTOMS
NERVOUS/ANXIOUS: 0
FLANK PAIN: 0
RECTAL PAIN: 0
AGITATION: 0
SPEECH DIFFICULTY: 0
SORE THROAT: 0
TROUBLE SWALLOWING: 0
RHINORRHEA: 0
HEADACHES: 0
STRIDOR: 0
FATIGUE: 0
CHEST TIGHTNESS: 0
DIZZINESS: 0
EYE PAIN: 0
ACTIVITY CHANGE: 0
NECK STIFFNESS: 0
SINUS PAIN: 0
COLOR CHANGE: 0
ABDOMINAL PAIN: 0
BACK PAIN: 1
DYSPHORIC MOOD: 0
ABDOMINAL DISTENTION: 0
HEMATURIA: 0
APPETITE CHANGE: 0
DYSURIA: 0
SLEEP DISTURBANCE: 0
SINUS PRESSURE: 0
UNEXPECTED WEIGHT CHANGE: 1
SEIZURES: 0
PALPITATIONS: 0
DECREASED CONCENTRATION: 0
PHOTOPHOBIA: 0
ARTHRALGIAS: 0
COUGH: 0
ADENOPATHY: 0
CONFUSION: 0
CONSTIPATION: 0
POLYDIPSIA: 0
MYALGIAS: 0
SHORTNESS OF BREATH: 0
FEVER: 0
BLOOD IN STOOL: 0
DIARRHEA: 0
POLYPHAGIA: 0

## 2024-04-09 NOTE — PROGRESS NOTES
Subjective   Patient ID: Bill Lim is a 58 y.o. male who presents for Back Pain.    HPI   Patient is having back pain from the mid back and radiates at the hip to the left leg. His is now having radiating pain up the back also.   He will cough and the muscles will spasm.  He is taking pain reliever and muscle relaxers. He takes Ibuprofen mostly.  He sleeps in a recliner.     He has lost 44 lbs from the last visit and not sure how.  He does not monitor his glucose at home.     Review of Systems   Constitutional:  Positive for unexpected weight change. Negative for activity change, appetite change, fatigue and fever.   HENT:  Negative for congestion, dental problem, ear discharge, ear pain, mouth sores, rhinorrhea, sinus pressure, sinus pain, sore throat, tinnitus and trouble swallowing.    Eyes:  Negative for photophobia, pain and visual disturbance.   Respiratory:  Negative for cough, chest tightness, shortness of breath and stridor.    Cardiovascular:  Negative for chest pain and palpitations.   Gastrointestinal:  Negative for abdominal distention, abdominal pain, blood in stool, constipation, diarrhea and rectal pain.   Endocrine: Negative for cold intolerance, heat intolerance, polydipsia, polyphagia and polyuria.   Genitourinary:  Negative for dysuria, flank pain, hematuria and urgency.   Musculoskeletal:  Positive for back pain. Negative for arthralgias, gait problem, myalgias and neck stiffness.   Skin:  Negative for color change and rash.   Allergic/Immunologic: Negative for environmental allergies and food allergies.   Neurological:  Negative for dizziness, seizures, syncope, speech difficulty and headaches.   Hematological:  Negative for adenopathy.   Psychiatric/Behavioral:  Negative for agitation, confusion, decreased concentration, dysphoric mood and sleep disturbance. The patient is not nervous/anxious.            Objective   /74 (BP Location: Right arm, Patient Position: Sitting, BP Cuff  "Size: Adult long)   Pulse 82   Resp 16   Ht 1.778 m (5' 10\")   Wt (!) 166 kg (367 lb)   SpO2 97%   BMI 52.66 kg/m²     Physical Exam  Constitutional: Well developed, well nourished, alert and in no acute distress   Eyes: Normal external exam. Pupils equally round and reactive to light with normal accommodation and extraocular movements intact.  Neck: Supple, no lymphadenopathy or masses.   Cardiovascular: Regular rate and rhythm, normal S1 and S2, no murmurs, gallops, or rubs. Radial pulses normal. No peripheral edema.  Pulmonary: No respiratory distress, lungs clear to auscultation bilaterally. No wheezes, rhonchi, rales.  Abdomen: soft,non tender, non distended, without masses or HSM  Skin: Warm, well perfused, normal skin turgor and color.   Neurologic: Cranial nerves II-XII grossly intact.   Psychiatric: Mood calm and affect normal  Musculoskeletal: Moving all extremities without restriction    Assessment/Plan   Problem List Items Addressed This Visit             ICD-10-CM    Osteoarthritis of spine with radiculopathy, lumbar region M47.26    Relevant Medications    ibuprofen 800 mg tablet    cyclobenzaprine (Flexeril) 10 mg tablet    Other Relevant Orders    Follow Up In [facility] - PCP - Established    Follow Up In [facility] - Pharmacy    Allergic rhinitis J30.9    Relevant Medications    albuterol 90 mcg/actuation inhaler    fluticasone (Flonase) 50 mcg/actuation nasal spray    Other Relevant Orders    Follow Up In [facility] - PCP - Established    Compression fracture of L2 lumbar vertebra (CMS/[facility]) S32.020A    Fatigue R53.83    Relevant Orders    Follow Up In [facility] - PCP - Established    CBC and Auto Differential    Thyroid Stimulating Hormone    Vitamin D 25-Hydroxy,Total (for eval of Vitamin D levels)    Hyperlipidemia E78.5    Relevant Orders    Follow Up In [facility] - PCP - Established    Comprehensive Metabolic Panel    Lipid Panel "    Type 2 diabetes mellitus (CMS/[facility]) E11.9    Relevant Medications    semaglutide 0.25 mg or 0.5 mg (2 mg/3 mL) pen injector (Start on 4/14/2024)    empagliflozin (Jardiance) 25 mg    Dexcom G7 Sensor device    Dexcom G7  misc    Other Relevant Orders    Follow Up In [facility] - PCP - Established    Follow Up In [facility] - Pharmacy    Hemoglobin A1C    Eosinophilic asthma - Primary J82.83    Relevant Medications    albuterol 2.5 mg /3 mL (0.083 %) nebulizer solution    albuterol 90 mcg/actuation inhaler     Other Visit Diagnoses         Codes    Class 3 severe obesity due to excess calories with serious comorbidity and body mass index (BMI) of 50.0 to 59.9 in adult (CMS/[facility])     E66.01, Z68.43    Relevant Orders    Follow Up In [facility] - PCP - Established    Follow Up In [facility] - Pharmacy    Prostate cancer screening     Z12.5    Relevant Orders    Follow Up In [facility] - PCP - Established    Prostate Specific Antigen, Screen        Aspirin 81 every day     consider Lipitor and valsartan next      Dexcom 7    Ozempic    LB XR next    Consider CT A & P in future    Consider MRI or CT LS spine next     Scribe Attestation  By signing my name below, I, Zachariah Calderon MA, Scribe   attest that this documentation has been prepared under the direction and in the presence of Andrzej Perry MD.    Provider Attestation - Scribe documentation    All medical record entries made by the Scribe were at my direction and personally dictated by me. I have reviewed the chart and agree that the record accurately reflects my personal performance of the history, physical exam, discussion and plan.    Continue current medications and therapy for chronic medical conditions

## 2024-04-10 RX ORDER — ALBUTEROL SULFATE 90 UG/1
2 AEROSOL, METERED RESPIRATORY (INHALATION) EVERY 6 HOURS PRN
Qty: 36 G | Refills: 2 | Status: SHIPPED | OUTPATIENT
Start: 2024-04-10

## 2024-04-10 RX ORDER — FLUTICASONE PROPIONATE 50 MCG
2 SPRAY, SUSPENSION (ML) NASAL DAILY
Qty: 48 G | Refills: 1 | Status: SHIPPED | OUTPATIENT
Start: 2024-04-10

## 2024-04-10 RX ORDER — PREDNISONE 10 MG/1
TABLET ORAL
Qty: 30 TABLET | Refills: 0 | Status: SHIPPED | OUTPATIENT
Start: 2024-04-10 | End: 2024-04-22 | Stop reason: ALTCHOICE

## 2024-04-10 RX ORDER — CYCLOBENZAPRINE HCL 10 MG
10 TABLET ORAL 3 TIMES DAILY PRN
Qty: 90 TABLET | Refills: 0 | Status: SHIPPED | OUTPATIENT
Start: 2024-04-10

## 2024-04-22 ENCOUNTER — TELEMEDICINE (OUTPATIENT)
Dept: PHARMACY | Facility: HOSPITAL | Age: 59
End: 2024-04-22
Payer: COMMERCIAL

## 2024-04-22 DIAGNOSIS — E11.65 TYPE 2 DIABETES MELLITUS WITH HYPERGLYCEMIA, WITHOUT LONG-TERM CURRENT USE OF INSULIN (MULTI): ICD-10-CM

## 2024-04-22 RX ORDER — LANCETS
EACH MISCELLANEOUS
Qty: 100 EACH | Refills: 3 | Status: SHIPPED | OUTPATIENT
Start: 2024-04-22

## 2024-04-22 RX ORDER — IBUPROFEN 200 MG
CAPSULE ORAL
Qty: 100 EACH | Refills: 3 | Status: SHIPPED | OUTPATIENT
Start: 2024-04-22

## 2024-04-22 RX ORDER — DEXTROSE 4 G
TABLET,CHEWABLE ORAL
Qty: 1 EACH | Refills: 0 | Status: SHIPPED | OUTPATIENT
Start: 2024-04-22

## 2024-04-22 NOTE — PROGRESS NOTES
Patient ID: Bill Lim is a 58 y.o. male who presents for Diabetes.    Referring Provider: Andrzej Perry MD  PCP: Andrzej Perry MD Last visit with PCP: 4/9/2024; Next visit with PCP: No follow up scheduled, encouraged      Subjective   Treatment Adherence:   Patient did take medications today.   Number of missed doses in last 7 days: 0.      Preferred pharmacy: [facility]  Can patient afford prescribed medications: Yes, Patient has high copays on Jardiance and Ozempic. Discussed with patient how to get copay manufacture cards online to help bring that cost down.    Diabetes  He presents for his initial diabetic visit. He has type 2 diabetes mellitus. His disease course has been fluctuating. Risk factors for coronary artery disease include diabetes mellitus, dyslipidemia, male sex and obesity. When asked about current treatments, none were reported. (Patient does not currently check blood sugars.) An ACE inhibitor/angiotensin II receptor blocker is not being taken.       Objective     There were no vitals taken for this visit.   BP Readings from Last 4 Encounters:   04/09/24 128/74   02/20/23 140/70   10/21/22 130/70   09/23/22 130/64      There were no vitals filed for this visit.     Labs  Lab Results   Component Value Date    BILITOT 0.4 10/21/2022    CALCIUM 9.1 10/21/2022    CO2 24 10/21/2022     10/21/2022    CREATININE 0.65 10/21/2022    GLUCOSE 274 (H) 10/21/2022    ALKPHOS 104 10/21/2022    K 4.4 10/21/2022    PROT 6.6 10/21/2022     (L) 10/21/2022    AST 42 (H) 10/21/2022    ALT 37 10/21/2022    BUN 9 10/21/2022    ANIONGAP 14 10/21/2022    MG 2.00 07/23/2021    ALBUMIN 4.0 10/21/2022    GFRMALE >90 10/21/2022     Lab Results   Component Value Date    TRIG 432 (H) 10/21/2022    CHOL 164 10/21/2022    HDL 21.0 (A) 10/21/2022     Lab Results   Component Value Date    HGBA1C 8.7 (A) 10/21/2022       Current Outpatient Medications   Medication Instructions    albuterol 90  mcg/actuation inhaler 2 puffs, inhalation, Every 6 hours PRN    albuterol 2.5 mg, nebulization, Every 4 hours PRN    blood sugar diagnostic (Blood Glucose Test) strip Use to test blood sugar once daily in the morning    blood-glucose meter misc Use to test blood sugars daily    cyclobenzaprine (FLEXERIL) 10 mg, oral, 3 times daily PRN    desoximetasone (Topicort) 0.25 % cream Topical, 2 times daily    empagliflozin (JARDIANCE) 25 mg, oral, Daily    fluticasone (Flonase) 50 mcg/actuation nasal spray 2 sprays, Each Nostril, Daily    ibuprofen 800 mg, oral, 3 times daily PRN    lancets misc Use to test blood sugars once daily in the morning    semaglutide 0.25 mg, subcutaneous, Once Weekly         Drug Interactions;  None requiring intervention at this time    Assessment/Plan   Problem List Items Addressed This Visit             ICD-10-CM    Type 2 diabetes mellitus (Multi) E11.9     Patients diabetes is uncontrolled as evidenced by the most recent A1c of 8.7% (Goal <7%) on 10/21/2022. Patient is due for an updated lab, and states he is going tomorrow.   START Jardiance 25mg daily Ozempic 0.25mg under the skin once weekly. Medications are at [facility]. Discussed with patient to go on the manufacture websites to get the copay card to help decrease cost.  START testing blood sugars once daily in the morning. Will send testing supplies to [facility].   Continue working towards healthy diet and lifestyle.         Relevant Medications    blood-glucose meter misc    blood sugar diagnostic (Blood Glucose Test) strip    lancets misc    Other Relevant Orders    Follow Up In [facility] - Pharmacy     Follow up with Clinical Pharmacy Team 5/20/2024 at 1:00PM.    Continue all meds under the continuation of care with the referring provider and clinical pharmacy team.    Please reach out to the Clinical Pharmacy Team if there are any further questions.     Verbal consent to manage patient's drug therapy was  obtained from patient. They were informed they may decline to participate or withdraw from participation in pharmacy services at any time.    Ewelina Carter, PharmD  [phone]

## 2024-04-23 NOTE — ASSESSMENT & PLAN NOTE
Patients diabetes is uncontrolled as evidenced by the most recent A1c of 8.7% (Goal <7%) on 10/21/2022. Patient is due for an updated lab, and states he is going tomorrow.   START Jardiance 25mg daily Ozempic 0.25mg under the skin once weekly. Medications are at [facility]. Discussed with patient to go on the manufacture websites to get the copay card to help decrease cost.  START testing blood sugars once daily in the morning. Will send testing supplies to [facility].   Continue working towards healthy diet and lifestyle.

## 2024-04-24 ENCOUNTER — LAB (OUTPATIENT)
Dept: LAB | Facility: LAB | Age: 59
End: 2024-04-24
Payer: COMMERCIAL

## 2024-04-24 DIAGNOSIS — E78.2 MIXED HYPERLIPIDEMIA: ICD-10-CM

## 2024-04-24 DIAGNOSIS — R53.83 FATIGUE, UNSPECIFIED TYPE: ICD-10-CM

## 2024-04-24 DIAGNOSIS — E11.65 TYPE 2 DIABETES MELLITUS WITH HYPERGLYCEMIA, WITHOUT LONG-TERM CURRENT USE OF INSULIN (MULTI): ICD-10-CM

## 2024-04-24 DIAGNOSIS — Z12.5 PROSTATE CANCER SCREENING: ICD-10-CM

## 2024-04-24 LAB
25(OH)D3 SERPL-MCNC: <7 NG/ML (ref 30–100)
ALBUMIN SERPL BCP-MCNC: 4.2 G/DL (ref 3.4–5)
ALP SERPL-CCNC: 263 U/L (ref 33–120)
ALT SERPL W P-5'-P-CCNC: 38 U/L (ref 10–52)
ANION GAP SERPL CALC-SCNC: 9 MMOL/L (ref 10–20)
AST SERPL W P-5'-P-CCNC: 32 U/L (ref 9–39)
BASOPHILS # BLD AUTO: 0.03 X10*3/UL (ref 0–0.1)
BASOPHILS NFR BLD AUTO: 0.5 %
BILIRUB SERPL-MCNC: 0.7 MG/DL (ref 0–1.2)
BUN SERPL-MCNC: 7 MG/DL (ref 6–23)
CALCIUM SERPL-MCNC: 8.2 MG/DL (ref 8.6–10.3)
CHLORIDE SERPL-SCNC: 106 MMOL/L (ref 98–107)
CHOLEST SERPL-MCNC: 149 MG/DL (ref 0–199)
CHOLESTEROL/HDL RATIO: 5.8
CO2 SERPL-SCNC: 27 MMOL/L (ref 21–32)
CREAT SERPL-MCNC: 0.51 MG/DL (ref 0.5–1.3)
EGFRCR SERPLBLD CKD-EPI 2021: >90 ML/MIN/1.73M*2
EOSINOPHIL # BLD AUTO: 0.07 X10*3/UL (ref 0–0.7)
EOSINOPHIL NFR BLD AUTO: 1.2 %
ERYTHROCYTE [DISTWIDTH] IN BLOOD BY AUTOMATED COUNT: 18.5 % (ref 11.5–14.5)
EST. AVERAGE GLUCOSE BLD GHB EST-MCNC: 154 MG/DL
GLUCOSE SERPL-MCNC: 148 MG/DL (ref 74–99)
HBA1C MFR BLD: 7 %
HCT VFR BLD AUTO: 35.1 % (ref 41–52)
HDLC SERPL-MCNC: 25.9 MG/DL
HGB BLD-MCNC: 9.9 G/DL (ref 13.5–17.5)
IMM GRANULOCYTES # BLD AUTO: 0.03 X10*3/UL (ref 0–0.7)
IMM GRANULOCYTES NFR BLD AUTO: 0.5 % (ref 0–0.9)
LDLC SERPL CALC-MCNC: 98 MG/DL
LYMPHOCYTES # BLD AUTO: 0.82 X10*3/UL (ref 1.2–4.8)
LYMPHOCYTES NFR BLD AUTO: 14.5 %
MCH RBC QN AUTO: 20.1 PG (ref 26–34)
MCHC RBC AUTO-ENTMCNC: 28.2 G/DL (ref 32–36)
MCV RBC AUTO: 71 FL (ref 80–100)
MONOCYTES # BLD AUTO: 0.37 X10*3/UL (ref 0.1–1)
MONOCYTES NFR BLD AUTO: 6.5 %
NEUTROPHILS # BLD AUTO: 4.34 X10*3/UL (ref 1.2–7.7)
NEUTROPHILS NFR BLD AUTO: 76.8 %
NON HDL CHOLESTEROL: 123 MG/DL (ref 0–149)
NRBC BLD-RTO: 0 /100 WBCS (ref 0–0)
PLATELET # BLD AUTO: 166 X10*3/UL (ref 150–450)
POTASSIUM SERPL-SCNC: 4.2 MMOL/L (ref 3.5–5.3)
PROT SERPL-MCNC: 6.3 G/DL (ref 6.4–8.2)
PSA SERPL-MCNC: 1.78 NG/ML
RBC # BLD AUTO: 4.93 X10*6/UL (ref 4.5–5.9)
SODIUM SERPL-SCNC: 138 MMOL/L (ref 136–145)
TRIGL SERPL-MCNC: 127 MG/DL (ref 0–149)
TSH SERPL-ACNC: 2.12 MIU/L (ref 0.44–3.98)
VLDL: 25 MG/DL (ref 0–40)
WBC # BLD AUTO: 5.7 X10*3/UL (ref 4.4–11.3)

## 2024-04-24 PROCEDURE — 80061 LIPID PANEL: CPT

## 2024-04-24 PROCEDURE — 82306 VITAMIN D 25 HYDROXY: CPT

## 2024-04-24 PROCEDURE — 83036 HEMOGLOBIN GLYCOSYLATED A1C: CPT

## 2024-04-24 PROCEDURE — 85025 COMPLETE CBC W/AUTO DIFF WBC: CPT

## 2024-04-24 PROCEDURE — 84153 ASSAY OF PSA TOTAL: CPT

## 2024-04-24 PROCEDURE — 80053 COMPREHEN METABOLIC PANEL: CPT

## 2024-04-24 PROCEDURE — 84443 ASSAY THYROID STIM HORMONE: CPT

## 2024-04-24 PROCEDURE — 36415 COLL VENOUS BLD VENIPUNCTURE: CPT

## 2024-05-08 ENCOUNTER — OFFICE VISIT (OUTPATIENT)
Dept: PRIMARY CARE | Facility: CLINIC | Age: 59
End: 2024-05-08
Payer: COMMERCIAL

## 2024-05-08 VITALS
HEIGHT: 70 IN | OXYGEN SATURATION: 97 % | HEART RATE: 72 BPM | SYSTOLIC BLOOD PRESSURE: 118 MMHG | WEIGHT: 315 LBS | DIASTOLIC BLOOD PRESSURE: 62 MMHG | BODY MASS INDEX: 45.1 KG/M2

## 2024-05-08 DIAGNOSIS — E66.01 MORBID OBESITY (MULTI): ICD-10-CM

## 2024-05-08 DIAGNOSIS — M47.26 OSTEOARTHRITIS OF SPINE WITH RADICULOPATHY, LUMBAR REGION: ICD-10-CM

## 2024-05-08 DIAGNOSIS — R29.898 WEAKNESS OF RIGHT LEG: ICD-10-CM

## 2024-05-08 DIAGNOSIS — E78.5 HYPERLIPIDEMIA, UNSPECIFIED HYPERLIPIDEMIA TYPE: ICD-10-CM

## 2024-05-08 DIAGNOSIS — E11.69 TYPE 2 DIABETES MELLITUS WITH OTHER SPECIFIED COMPLICATION, UNSPECIFIED WHETHER LONG TERM INSULIN USE (MULTI): ICD-10-CM

## 2024-05-08 DIAGNOSIS — M47.16 OSTEOARTHRITIS OF LUMBAR SPINE WITH MYELOPATHY: ICD-10-CM

## 2024-05-08 DIAGNOSIS — E55.9 VITAMIN D DEFICIENCY: ICD-10-CM

## 2024-05-08 DIAGNOSIS — L30.9 ECZEMA, UNSPECIFIED TYPE: ICD-10-CM

## 2024-05-08 DIAGNOSIS — R73.9 HYPERGLYCEMIA: ICD-10-CM

## 2024-05-08 DIAGNOSIS — Z13.9 SCREENING DUE: ICD-10-CM

## 2024-05-08 DIAGNOSIS — M17.0 OSTEOARTHRITIS OF BOTH KNEES, UNSPECIFIED OSTEOARTHRITIS TYPE: ICD-10-CM

## 2024-05-08 PROCEDURE — 99214 OFFICE O/P EST MOD 30 MIN: CPT | Performed by: FAMILY MEDICINE

## 2024-05-08 RX ORDER — ACETAMINOPHEN 500 MG
2000 TABLET ORAL DAILY
Qty: 90 CAPSULE | Refills: 1 | Status: SHIPPED | OUTPATIENT
Start: 2024-05-08

## 2024-05-08 RX ORDER — NALOXONE HYDROCHLORIDE 4 MG/.1ML
1 SPRAY NASAL AS NEEDED
Qty: 2 EACH | Refills: 0 | Status: SHIPPED | OUTPATIENT
Start: 2024-05-08

## 2024-05-08 RX ORDER — GABAPENTIN 100 MG/1
100 CAPSULE ORAL 3 TIMES DAILY
Qty: 60 CAPSULE | Refills: 1 | Status: SHIPPED | OUTPATIENT
Start: 2024-05-08 | End: 2024-06-17

## 2024-05-08 RX ORDER — DESOXIMETASONE 2.5 MG/G
CREAM TOPICAL 2 TIMES DAILY
Qty: 60 G | Refills: 3 | Status: SHIPPED | OUTPATIENT
Start: 2024-05-08 | End: 2025-05-08

## 2024-05-08 RX ORDER — TRAMADOL HYDROCHLORIDE 50 MG/1
50 TABLET ORAL EVERY 6 HOURS PRN
Qty: 30 TABLET | Refills: 0 | Status: SHIPPED | OUTPATIENT
Start: 2024-05-08 | End: 2024-05-17 | Stop reason: SDUPTHER

## 2024-05-08 ASSESSMENT — PATIENT HEALTH QUESTIONNAIRE - PHQ9
SUM OF ALL RESPONSES TO PHQ9 QUESTIONS 1 AND 2: 0
1. LITTLE INTEREST OR PLEASURE IN DOING THINGS: NOT AT ALL
2. FEELING DOWN, DEPRESSED OR HOPELESS: NOT AT ALL

## 2024-05-08 ASSESSMENT — ENCOUNTER SYMPTOMS
CONSTIPATION: 0
ARTHRALGIAS: 0
DIZZINESS: 0
POLYPHAGIA: 0
HEADACHES: 0
APPETITE CHANGE: 0
RECTAL PAIN: 0
SINUS PRESSURE: 0
SORE THROAT: 0
MYALGIAS: 0
FLANK PAIN: 0
SLEEP DISTURBANCE: 0
EYE PAIN: 0
NECK STIFFNESS: 0
CHEST TIGHTNESS: 0
STRIDOR: 0
TROUBLE SWALLOWING: 0
CONSTITUTIONAL NEGATIVE: 1
SINUS PAIN: 0
BACK PAIN: 1
ADENOPATHY: 0
AGITATION: 0
CONFUSION: 0
DYSURIA: 0
SHORTNESS OF BREATH: 0
SPEECH DIFFICULTY: 0
DYSPHORIC MOOD: 0
COUGH: 0
ABDOMINAL PAIN: 0
DECREASED CONCENTRATION: 0
COLOR CHANGE: 0
SEIZURES: 0
DIARRHEA: 0
NERVOUS/ANXIOUS: 0
ACTIVITY CHANGE: 0
ABDOMINAL DISTENTION: 0
FATIGUE: 0
HEMATURIA: 0
RHINORRHEA: 0
PHOTOPHOBIA: 0
PALPITATIONS: 0
FEVER: 0
BLOOD IN STOOL: 0
POLYDIPSIA: 0

## 2024-05-08 NOTE — PROGRESS NOTES
Subjective   Patient ID: Bill Lim is a 58 y.o. male who presents for Back Pain.    Patient presents today to follow up on low back pain. Patient states the pain is gradually worsening. He is having a difficult time walking due to pain. Pain is rated 8/10. He is using Ibuprofen with no relief anymore. Pain does radiate down both legs. Pain is described as aching. Pain is worse at night, has to move positions while lying down which is starting to effect his sleep. Pain is worsened with bending and walking/standing. Patient is using a cane most of the time with walking, he has had to use a walker at times due to extreme pain.     Back Pain  The problem has been gradually worsening since onset. The pain is present in the lumbar spine and gluteal. The quality of the pain is described as aching. The pain is at a severity of 8/10. The pain is Worse during the night. The symptoms are aggravated by bending and standing. Pertinent negatives include no abdominal pain, chest pain, dysuria, fever or headaches.        Review of Systems   Constitutional: Negative.  Negative for activity change, appetite change, fatigue and fever.   HENT:  Negative for congestion, dental problem, ear discharge, ear pain, mouth sores, rhinorrhea, sinus pressure, sinus pain, sore throat, tinnitus and trouble swallowing.    Eyes:  Negative for photophobia, pain and visual disturbance.   Respiratory:  Negative for cough, chest tightness, shortness of breath and stridor.    Cardiovascular:  Negative for chest pain and palpitations.   Gastrointestinal:  Negative for abdominal distention, abdominal pain, blood in stool, constipation, diarrhea and rectal pain.   Endocrine: Negative for cold intolerance, heat intolerance, polydipsia, polyphagia and polyuria.   Genitourinary:  Negative for dysuria, flank pain, hematuria and urgency.   Musculoskeletal:  Positive for back pain. Negative for arthralgias, gait problem, myalgias and neck stiffness.   Skin:   "Negative for color change and rash.   Allergic/Immunologic: Negative for environmental allergies and food allergies.   Neurological:  Negative for dizziness, seizures, syncope, speech difficulty and headaches.   Hematological:  Negative for adenopathy.   Psychiatric/Behavioral:  Negative for agitation, confusion, decreased concentration, dysphoric mood and sleep disturbance. The patient is not nervous/anxious.        Objective   /62 (BP Location: Left arm, Patient Position: Sitting, BP Cuff Size: Adult)   Pulse 72   Ht 1.778 m (5' 10\")   Wt (!) 171 kg (378 lb)   SpO2 97%   BMI 54.24 kg/m²     Physical Exam  Constitutional: Well developed, well nourished, alert and in no acute distress   Eyes: Normal external exam. Pupils equally round and reactive to light with normal accommodation and extraocular movements intact.  Neck: Supple, no lymphadenopathy or masses.   Cardiovascular: Regular rate and rhythm, normal S1 and S2, no murmurs, gallops, or rubs. Radial pulses normal. No peripheral edema.  Pulmonary: No respiratory distress, lungs clear to auscultation bilaterally. No wheezes, rhonchi, rales.  Abdomen: soft,non tender, non distended, without masses or HSM  Skin: Warm, well perfused, normal skin turgor and color.   Neurologic: Cranial nerves II-XII grossly intact.   Psychiatric: Mood calm and affect normal  Musculoskeletal: Moving all extremities without restriction    Assessment/Plan   Problem List Items Addressed This Visit             ICD-10-CM    Osteoarthritis of spine with radiculopathy, lumbar region M47.26    Relevant Medications    gabapentin (Neurontin) 100 mg capsule    traMADol (Ultram) 50 mg tablet    naloxone (Narcan) 4 mg/0.1 mL nasal spray    Other Relevant Orders    Follow Up In [facility] - PCP - Established    Eczema L30.9    Relevant Medications    desoximetasone (Topicort) 0.25 % cream    naloxone (Narcan) 4 mg/0.1 mL nasal spray    Other Relevant Orders    Follow Up In " [facility] - PCP - Established    Hyperglycemia R73.9    Relevant Medications    naloxone (Narcan) 4 mg/0.1 mL nasal spray    Other Relevant Orders    Follow Up In [facility] - PCP - Established    Hyperlipidemia E78.5    Relevant Medications    naloxone (Narcan) 4 mg/0.1 mL nasal spray    Other Relevant Orders    Follow Up In [facility] - PCP - Established    Osteoarthritis of knees, bilateral M17.0    Relevant Medications    naloxone (Narcan) 4 mg/0.1 mL nasal spray    Other Relevant Orders    Follow Up In [facility] - PCP - Established    Morbid obesity (Multi) E66.01    Relevant Medications    semaglutide 0.25 mg or 0.5 mg (2 mg/3 mL) pen injector (Start on 5/12/2024)    naloxone (Narcan) 4 mg/0.1 mL nasal spray    Other Relevant Orders    Follow Up In [facility]  PCP - Established    Type 2 diabetes mellitus (Multi) E11.9    Relevant Medications    semaglutide 0.25 mg or 0.5 mg (2 mg/3 mL) pen injector (Start on 5/12/2024)    empagliflozin (Jardiance) 25 mg    naloxone (Narcan) 4 mg/0.1 mL nasal spray    Other Relevant Orders    Follow Up In [facility] - PCP - Established    Vitamin D deficiency E55.9    Relevant Medications    cholecalciferol (Vitamin D3) 50 mcg (2,000 unit) capsule    naloxone (Narcan) 4 mg/0.1 mL nasal spray    Other Relevant Orders    Follow Up In [facility] - PCP - Established     Other Visit Diagnoses         Codes    Screening due     Z13.9    Relevant Medications    naloxone (Narcan) 4 mg/0.1 mL nasal spray    Other Relevant Orders    Colonoscopy Screening; Average Risk Patient    Follow Up In [facility] - PCP - Established    Osteoarthritis of lumbar spine with myelopathy     M47.16    Relevant Medications    naloxone (Narcan) 4 mg/0.1 mL nasal spray    Other Relevant Orders    MR lumbar spine wo IV contrast    Follow Up In [facility] - PCP - Established    Weakness of right leg      R29.898    Relevant Medications    naloxone (Narcan) 4 mg/0.1 mL nasal spray    Other Relevant Orders    MR lumbar spine wo IV contrast    Follow Up In [facility] - PCP - Established          Scribe Attestation  By signing my name below, I, Zachariah Calderon MA, Christinibe   attest that this documentation has been prepared under the direction and in the presence of Andrzej Perry MD.    Provider Attestation - Scribe documentation    All medical record entries made by the Scribe were at my direction and personally dictated by me. I have reviewed the chart and agree that the record accurately reflects my personal performance of the history, physical exam, discussion and plan.    CT screen lung next    -Get MR lumbar     -Aspirin 81 every day      -consider Lipitor and valsartan next       -Consider CT A & P in future     - check glucose twice daily     Continue current medications and therapy for chronic medical conditions

## 2024-05-14 ENCOUNTER — TRANSCRIBE ORDERS (OUTPATIENT)
Dept: ADMINISTRATIVE | Age: 59
End: 2024-05-14

## 2024-05-14 DIAGNOSIS — M47.16 OSTEOARTHRITIS OF LUMBAR SPINE WITH MYELOPATHY: Primary | ICD-10-CM

## 2024-05-17 DIAGNOSIS — M47.26 OSTEOARTHRITIS OF SPINE WITH RADICULOPATHY, LUMBAR REGION: ICD-10-CM

## 2024-05-17 NOTE — TELEPHONE ENCOUNTER
[id number] [place] - [address]  [address] [place] [address]   Pt needs refill on tramadol please

## 2024-05-20 ENCOUNTER — TELEPHONE (OUTPATIENT)
Dept: GASTROENTEROLOGY | Facility: CLINIC | Age: 59
End: 2024-05-20

## 2024-05-20 RX ORDER — TRAMADOL HYDROCHLORIDE 50 MG/1
50 TABLET ORAL EVERY 6 HOURS PRN
Qty: 60 TABLET | Refills: 0 | Status: SHIPPED | OUTPATIENT
Start: 2024-05-20

## 2024-05-21 ENCOUNTER — APPOINTMENT (OUTPATIENT)
Dept: PRIMARY CARE | Facility: CLINIC | Age: 59
End: 2024-05-21
Payer: COMMERCIAL

## 2024-05-29 DIAGNOSIS — R29.898 RIGHT LEG WEAKNESS: ICD-10-CM

## 2024-05-29 DIAGNOSIS — M47.26 OSTEOARTHRITIS OF SPINE WITH RADICULOPATHY, LUMBAR REGION: ICD-10-CM

## 2024-06-03 ENCOUNTER — TELEPHONE (OUTPATIENT)
Dept: PRIMARY CARE | Facility: CLINIC | Age: 59
End: 2024-06-03
Payer: COMMERCIAL

## 2024-06-03 NOTE — TELEPHONE ENCOUNTER
CT Lumbar Spine is denied by insurance. Provider has the option to complete a peer to peer.     Peer to peer  Ph: [phone]  Case#: [id number]

## 2024-06-17 ENCOUNTER — APPOINTMENT (OUTPATIENT)
Dept: RADIOLOGY | Facility: HOSPITAL | Age: 59
End: 2024-06-17
Payer: COMMERCIAL

## 2024-06-19 ENCOUNTER — HOSPITAL ENCOUNTER (OUTPATIENT)
Dept: RADIOLOGY | Facility: HOSPITAL | Age: 59
Discharge: HOME | End: 2024-06-19
Payer: COMMERCIAL

## 2024-06-19 DIAGNOSIS — M47.26 OSTEOARTHRITIS OF SPINE WITH RADICULOPATHY, LUMBAR REGION: ICD-10-CM

## 2024-06-19 DIAGNOSIS — R29.898 RIGHT LEG WEAKNESS: ICD-10-CM

## 2024-06-19 PROCEDURE — 72131 CT LUMBAR SPINE W/O DYE: CPT

## 2024-07-05 ENCOUNTER — HOSPITAL ENCOUNTER (EMERGENCY)
Age: 59
Discharge: HOME | End: 2024-07-05
Payer: COMMERCIAL

## 2024-07-05 ENCOUNTER — APPOINTMENT (OUTPATIENT)
Dept: CT IMAGING | Age: 59
End: 2024-07-05
Payer: COMMERCIAL

## 2024-07-05 VITALS
TEMPERATURE: 100.2 F | DIASTOLIC BLOOD PRESSURE: 77 MMHG | BODY MASS INDEX: 45.1 KG/M2 | OXYGEN SATURATION: 96 % | HEIGHT: 70 IN | RESPIRATION RATE: 20 BRPM | WEIGHT: 315 LBS | SYSTOLIC BLOOD PRESSURE: 149 MMHG | HEART RATE: 93 BPM

## 2024-07-05 DIAGNOSIS — K52.9 ENTERITIS: ICD-10-CM

## 2024-07-05 DIAGNOSIS — B34.8 RHINOVIRUS: ICD-10-CM

## 2024-07-05 DIAGNOSIS — G89.29 ACUTE EXACERBATION OF CHRONIC LOW BACK PAIN: ICD-10-CM

## 2024-07-05 DIAGNOSIS — M54.50 ACUTE EXACERBATION OF CHRONIC LOW BACK PAIN: ICD-10-CM

## 2024-07-05 DIAGNOSIS — B34.8 PARAINFLUENZA: Primary | ICD-10-CM

## 2024-07-05 LAB
ALBUMIN SERPL-MCNC: 3.7 G/DL (ref 3.5–4.6)
ALP SERPL-CCNC: 309 U/L (ref 35–104)
ALT SERPL-CCNC: 27 U/L (ref 0–41)
ANION GAP SERPL CALCULATED.3IONS-SCNC: 10 MEQ/L (ref 9–15)
ANISOCYTOSIS BLD QL SMEAR: ABNORMAL
AST SERPL-CCNC: 38 U/L (ref 0–40)
B PARAP IS1001 DNA NPH QL NAA+NON-PROBE: NOT DETECTED
B PERT.PT PRMT NPH QL NAA+NON-PROBE: NOT DETECTED
BASOPHILS # BLD: 0 K/UL (ref 0–0.2)
BASOPHILS NFR BLD: 0.1 %
BILIRUB SERPL-MCNC: 0.9 MG/DL (ref 0.2–0.7)
BILIRUB UR QL STRIP: NEGATIVE
BUN SERPL-MCNC: 6 MG/DL (ref 6–20)
C PNEUM DNA NPH QL NAA+NON-PROBE: NOT DETECTED
CALCIUM SERPL-MCNC: 8.2 MG/DL (ref 8.5–9.9)
CHLORIDE SERPL-SCNC: 98 MEQ/L (ref 95–107)
CLARITY UR: CLEAR
CO2 SERPL-SCNC: 22 MEQ/L (ref 20–31)
COLOR UR: YELLOW
CREAT SERPL-MCNC: 0.58 MG/DL (ref 0.7–1.2)
EOSINOPHIL # BLD: 0 K/UL (ref 0–0.7)
EOSINOPHIL NFR BLD: 0.3 %
ERYTHROCYTE [DISTWIDTH] IN BLOOD BY AUTOMATED COUNT: 18.8 % (ref 11.5–14.5)
FLUAV RNA NPH QL NAA+NON-PROBE: NOT DETECTED
FLUBV RNA NPH QL NAA+NON-PROBE: NOT DETECTED
GLOBULIN SER CALC-MCNC: 2.8 G/DL (ref 2.3–3.5)
GLUCOSE SERPL-MCNC: 148 MG/DL (ref 70–99)
GLUCOSE UR STRIP-MCNC: NEGATIVE MG/DL
HADV DNA NPH QL NAA+NON-PROBE: NOT DETECTED
HCOV 229E RNA NPH QL NAA+NON-PROBE: NOT DETECTED
HCOV HKU1 RNA NPH QL NAA+NON-PROBE: NOT DETECTED
HCOV NL63 RNA NPH QL NAA+NON-PROBE: NOT DETECTED
HCOV OC43 RNA NPH QL NAA+NON-PROBE: NOT DETECTED
HCT VFR BLD AUTO: 36 % (ref 42–52)
HGB BLD-MCNC: 10.5 G/DL (ref 14–18)
HGB UR QL STRIP: NEGATIVE
HMPV RNA NPH QL NAA+NON-PROBE: NOT DETECTED
HPIV1 RNA NPH QL NAA+NON-PROBE: NOT DETECTED
HPIV2 RNA NPH QL NAA+NON-PROBE: NOT DETECTED
HPIV3 RNA NPH QL NAA+NON-PROBE: DETECTED
HPIV4 RNA NPH QL NAA+NON-PROBE: NOT DETECTED
KETONES UR STRIP-MCNC: NEGATIVE MG/DL
LACTIC ACID, SEPSIS: 1.2 MMOL/L (ref 0.5–1.9)
LACTIC ACID, SEPSIS: 1.7 MMOL/L (ref 0.5–1.9)
LEUKOCYTE ESTERASE UR QL STRIP: NEGATIVE
LYMPHOCYTES # BLD: 0 K/UL (ref 1–4.8)
LYMPHOCYTES NFR BLD: 2.5 %
M PNEUMO DNA NPH QL NAA+NON-PROBE: NOT DETECTED
MCH RBC QN AUTO: 19.8 PG (ref 27–31.3)
MCHC RBC AUTO-ENTMCNC: 29.2 % (ref 33–37)
MCV RBC AUTO: 67.8 FL (ref 79–92.2)
MICROCYTES BLD QL SMEAR: ABNORMAL
MONOCYTES # BLD: 0.3 K/UL (ref 0.2–0.8)
MONOCYTES NFR BLD: 2.8 %
NEUTROPHILS # BLD: 9 K/UL (ref 1.4–6.5)
NEUTS BAND NFR BLD MANUAL: 3 %
NEUTS SEG NFR BLD: 94 %
NITRITE UR QL STRIP: NEGATIVE
OVALOCYTES BLD QL SMEAR: ABNORMAL
PH UR STRIP: 7.5 [PH] (ref 5–9)
PLATELET # BLD AUTO: 249 K/UL (ref 130–400)
PLATELET BLD QL SMEAR: ADEQUATE
POC CREATININE WHOLE BLOOD: 0.6
POIKILOCYTOSIS BLD QL SMEAR: ABNORMAL
POLYCHROMASIA BLD QL SMEAR: ABNORMAL
POTASSIUM SERPL-SCNC: 3.8 MEQ/L (ref 3.4–4.9)
PROCALCITONIN SERPL IA-MCNC: 0.05 NG/ML (ref 0–0.15)
PROT SERPL-MCNC: 6.5 G/DL (ref 6.3–8)
PROT UR STRIP-MCNC: NEGATIVE MG/DL
RBC # BLD AUTO: 5.31 M/UL (ref 4.7–6.1)
RSV RNA NPH QL NAA+NON-PROBE: NOT DETECTED
RV+EV RNA NPH QL NAA+NON-PROBE: DETECTED
SARS-COV-2 RNA NPH QL NAA+NON-PROBE: NOT DETECTED
SLIDE REVIEW: ABNORMAL
SMUDGE CELLS BLD QL SMEAR: 2.9
SODIUM SERPL-SCNC: 130 MEQ/L (ref 135–144)
SP GR UR STRIP: 1.01 (ref 1–1.03)
URINE REFLEX TO CULTURE: NORMAL
UROBILINOGEN UR STRIP-ACNC: 1 E.U./DL
WBC # BLD AUTO: 9.3 K/UL (ref 4.8–10.8)

## 2024-07-05 PROCEDURE — 0202U NFCT DS 22 TRGT SARS-COV-2: CPT

## 2024-07-05 PROCEDURE — 74177 CT ABD & PELVIS W/CONTRAST: CPT

## 2024-07-05 PROCEDURE — 96374 THER/PROPH/DIAG INJ IV PUSH: CPT

## 2024-07-05 PROCEDURE — 83605 ASSAY OF LACTIC ACID: CPT

## 2024-07-05 PROCEDURE — 84145 PROCALCITONIN (PCT): CPT

## 2024-07-05 PROCEDURE — 96375 TX/PRO/DX INJ NEW DRUG ADDON: CPT

## 2024-07-05 PROCEDURE — 2580000003 HC RX 258: Performed by: PHYSICIAN ASSISTANT

## 2024-07-05 PROCEDURE — 36415 COLL VENOUS BLD VENIPUNCTURE: CPT

## 2024-07-05 PROCEDURE — 99285 EMERGENCY DEPT VISIT HI MDM: CPT

## 2024-07-05 PROCEDURE — 81003 URINALYSIS AUTO W/O SCOPE: CPT

## 2024-07-05 PROCEDURE — 6360000002 HC RX W HCPCS: Performed by: PHYSICIAN ASSISTANT

## 2024-07-05 PROCEDURE — 85025 COMPLETE CBC W/AUTO DIFF WBC: CPT

## 2024-07-05 PROCEDURE — 6360000004 HC RX CONTRAST MEDICATION: Performed by: PHYSICIAN ASSISTANT

## 2024-07-05 PROCEDURE — 80053 COMPREHEN METABOLIC PANEL: CPT

## 2024-07-05 RX ORDER — METHOCARBAMOL 100 MG/ML
500 INJECTION, SOLUTION INTRAMUSCULAR; INTRAVENOUS ONCE
Status: COMPLETED | OUTPATIENT
Start: 2024-07-05 | End: 2024-07-05

## 2024-07-05 RX ORDER — ONDANSETRON 2 MG/ML
4 INJECTION INTRAMUSCULAR; INTRAVENOUS ONCE
Status: COMPLETED | OUTPATIENT
Start: 2024-07-05 | End: 2024-07-05

## 2024-07-05 RX ORDER — CYCLOBENZAPRINE HCL 10 MG
10 TABLET ORAL 3 TIMES DAILY PRN
Qty: 10 TABLET | Refills: 0 | Status: SHIPPED | OUTPATIENT
Start: 2024-07-05 | End: 2024-07-15

## 2024-07-05 RX ORDER — ONDANSETRON 4 MG/1
4 TABLET, ORALLY DISINTEGRATING ORAL 3 TIMES DAILY PRN
Qty: 21 TABLET | Refills: 0 | Status: SHIPPED | OUTPATIENT
Start: 2024-07-05

## 2024-07-05 RX ORDER — 0.9 % SODIUM CHLORIDE 0.9 %
30 INTRAVENOUS SOLUTION INTRAVENOUS ONCE
Status: DISCONTINUED | OUTPATIENT
Start: 2024-07-05 | End: 2024-07-05

## 2024-07-05 RX ORDER — 0.9 % SODIUM CHLORIDE 0.9 %
1000 INTRAVENOUS SOLUTION INTRAVENOUS ONCE
Status: COMPLETED | OUTPATIENT
Start: 2024-07-05 | End: 2024-07-05

## 2024-07-05 RX ADMIN — SODIUM CHLORIDE 1000 ML: 9 INJECTION, SOLUTION INTRAVENOUS at 11:47

## 2024-07-05 RX ADMIN — ONDANSETRON 4 MG: 2 INJECTION INTRAMUSCULAR; INTRAVENOUS at 11:46

## 2024-07-05 RX ADMIN — IOPAMIDOL 75 ML: 755 INJECTION, SOLUTION INTRAVENOUS at 12:53

## 2024-07-05 RX ADMIN — METHOCARBAMOL 500 MG: 100 INJECTION INTRAMUSCULAR; INTRAVENOUS at 14:32

## 2024-07-05 ASSESSMENT — ENCOUNTER SYMPTOMS
COUGH: 0
PHOTOPHOBIA: 0
ABDOMINAL PAIN: 0
EYE PAIN: 0
RHINORRHEA: 1
SHORTNESS OF BREATH: 0
SORE THROAT: 0
NAUSEA: 0
DIARRHEA: 0
BACK PAIN: 1
VOMITING: 0

## 2024-07-05 ASSESSMENT — PAIN SCALES - GENERAL
PAINLEVEL_OUTOF10: 4
PAINLEVEL_OUTOF10: 7

## 2024-07-05 ASSESSMENT — PAIN DESCRIPTION - LOCATION
LOCATION: ABDOMEN
LOCATION: ABDOMEN

## 2024-07-05 ASSESSMENT — PAIN DESCRIPTION - ORIENTATION
ORIENTATION: LOWER
ORIENTATION: RIGHT

## 2024-07-05 ASSESSMENT — PAIN DESCRIPTION - DESCRIPTORS
DESCRIPTORS: ACHING
DESCRIPTORS: ACHING

## 2024-07-05 ASSESSMENT — PAIN - FUNCTIONAL ASSESSMENT
PAIN_FUNCTIONAL_ASSESSMENT: NONE - DENIES PAIN
PAIN_FUNCTIONAL_ASSESSMENT: 0-10

## 2024-07-05 ASSESSMENT — PAIN DESCRIPTION - PAIN TYPE: TYPE: ACUTE PAIN

## 2024-07-05 NOTE — ED PROVIDER NOTES
[facility] ED  eMERGENCY dEPARTMENTeNCOUnter      Pt Name: Ned Coelho  MRN: [id number]  Birthdate 1965  Date ofevaluation: 7/5/2024  Provider: Kimberly Carrasco PA-C    CHIEF COMPLAINT       Chief Complaint   Patient presents with    Dizziness         HISTORY OF PRESENT ILLNESS   (Location/Symptom, Timing/Onset,Context/Setting, Quality, Duration, Modifying Factors, Severity)  Note limiting factors.   Ned Coelho is a 58 y.o. male who presents to the emergency department chills, dizziness, nausea, runny nose, right flank pain that started this morning. Took motrin this morning. Denies vomiting or diarrhea. Still has gb. Denies urinary symptoms but reports h/o of uti in the past. At this point pain is mild.  Pt denies cp sob cough.     HPI    NursingNotes were reviewed.    REVIEW OF SYSTEMS    (2-9 systems for level 4, 10 or more for level 5)     Review of Systems   Constitutional:  Negative for chills, diaphoresis, fatigue and fever.   HENT:  Positive for congestion and rhinorrhea. Negative for sore throat.    Eyes:  Negative for photophobia and pain.   Respiratory:  Negative for cough and shortness of breath.    Cardiovascular:  Negative for chest pain and palpitations.   Gastrointestinal:  Negative for abdominal pain, diarrhea, nausea and vomiting.   Genitourinary:  Positive for flank pain. Negative for dysuria.   Musculoskeletal:  Positive for back pain.   Skin:  Negative for rash.   Neurological:  Negative for dizziness, light-headedness and headaches.   Psychiatric/Behavioral: Negative.     All other systems reviewed and are negative.      Except as noted above the remainder of the review of systems was reviewed and negative.       PAST MEDICAL HISTORY     Past Medical History:   Diagnosis Date    Abnormal EKG 9/7/2016    Abrasion 8/21/2014    Atypical chest pain 2/18/2013    Class 3 obesity with serious comorbidity and body mass index (BMI) of 60.0 to 69.9 in adult 1/23/2018    Cold sore

## 2024-07-05 NOTE — CARE COORDINATION
The patient did not request that this nurse make him a follow up appointment with his Dr at this time. His nurse is at the bedside with his d/c papers.

## 2024-07-05 NOTE — DISCHARGE INSTR - COC
Continuity of Care Form    Patient Name: Ned Coelho   :  1965  MRN:  [id number]    Admit date:  2024  Discharge date:  ***    Code Status Order: No Order   Advance Directives:     Admitting Physician:  No admitting provider for patient encounter.  PCP: Felix Muir MD    Discharging Nurse: ***  Discharging Hospital Unit/Room#:   Discharging Unit Phone Number: ***    Emergency Contact:   Extended Emergency Contact Information  Primary Emergency Contact: Thea Coelho  Address: [address] [place] [address]  Home Phone: [phone]  Work Phone: [phone]  Relation: Spouse    Past Surgical History:  Past Surgical History:   Procedure Laterality Date    COLONOSCOPY  14    POLYPECTOMY JARMOSZUK    FASCIOTOMY Right 2006    triple fasciotomy right leg    TONSILLECTOMY         Immunization History:   Immunization History   Administered Date(s) Administered    Influenza Virus Vaccine 2009    Td, unspecified formulation 2006       Active Problems:  Patient Active Problem List   Diagnosis Code    Osteoarthritis M19.90    Sleep apnea G47.30    Finger infection L08.9    Spondylarthrosis M47.9    Thoracolumbar back pain M54.50, M54.6    Right knee pain M25.561    Lateral epicondylitis, R M77.10    Pleuritis R09.1    Atypical chest pain R07.89    Tobacco abuse Z72.0    ETD (eustachian tube dysfunction) H69.90    Left otitis media H66.92    Low back pain M54.50    Leg wound, right S81.801A    Abrasion T14.8XXA    Leg wound, right S81.801A    Edema R60.9    Weight loss R63.4    Hemorrhoids K64.9    Unstable knee M25.369    Cold sore B00.1    Anal fissure K60.2    Other and unspecified hyperlipidemia E78.5    Abnormal EKG R94.31    Hyperglycemia R73.9    Synovitis and tenosynovitis, unspecified M65.9    Class 3 obesity with serious comorbidity and body mass index (BMI) of 60.0 to 69.9 in adult [id number]    Tingling in extremities, LUE R20.2

## 2024-07-05 NOTE — ED TRIAGE NOTES
Patient c/o dizziness, nausea, chills, right sided abdominal pain and kidney pain.   Patient has low grade fever.   Symptoms started this morning.

## 2024-07-06 LAB
PERFORMED ON: ABNORMAL
POC CREATININE: 0.6 MG/DL (ref 0.8–1.3)
POC SAMPLE TYPE: ABNORMAL

## 2024-09-05 ENCOUNTER — APPOINTMENT (OUTPATIENT)
Dept: GENERAL RADIOLOGY | Age: 59
End: 2024-09-05
Payer: COMMERCIAL

## 2024-09-05 ENCOUNTER — HOSPITAL ENCOUNTER (EMERGENCY)
Age: 59
Discharge: HOME | End: 2024-09-05
Attending: STUDENT IN AN ORGANIZED HEALTH CARE EDUCATION/TRAINING PROGRAM
Payer: COMMERCIAL

## 2024-09-05 VITALS
TEMPERATURE: 98 F | DIASTOLIC BLOOD PRESSURE: 86 MMHG | SYSTOLIC BLOOD PRESSURE: 158 MMHG | WEIGHT: 315 LBS | OXYGEN SATURATION: 99 % | BODY MASS INDEX: 45.1 KG/M2 | HEART RATE: 83 BPM | HEIGHT: 70 IN | RESPIRATION RATE: 18 BRPM
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
## 2024-09-05 DIAGNOSIS — S43.401A SPRAIN OF RIGHT SHOULDER, UNSPECIFIED SHOULDER SPRAIN TYPE, INITIAL ENCOUNTER: ICD-10-CM

## 2024-09-05 DIAGNOSIS — S82.002A CLOSED NONDISPLACED FRACTURE OF LEFT PATELLA, UNSPECIFIED FRACTURE MORPHOLOGY, INITIAL ENCOUNTER: Primary | ICD-10-CM

## 2024-09-05 PROCEDURE — 96372 THER/PROPH/DIAG INJ SC/IM: CPT

## 2024-09-05 PROCEDURE — 99284 EMERGENCY DEPT VISIT MOD MDM: CPT

## 2024-09-05 PROCEDURE — 6370000000 HC RX 637 (ALT 250 FOR IP): Performed by: STUDENT IN AN ORGANIZED HEALTH CARE EDUCATION/TRAINING PROGRAM

## 2024-09-05 PROCEDURE — 73030 X-RAY EXAM OF SHOULDER: CPT

## 2024-09-05 PROCEDURE — 6360000002 HC RX W HCPCS: Performed by: STUDENT IN AN ORGANIZED HEALTH CARE EDUCATION/TRAINING PROGRAM

## 2024-09-05 PROCEDURE — 73562 X-RAY EXAM OF KNEE 3: CPT

## 2024-09-05 RX ORDER — NAPROXEN 500 MG/1
500 TABLET ORAL 2 TIMES DAILY WITH MEALS
Qty: 60 TABLET | Refills: 5 | Status: SHIPPED | OUTPATIENT
Start: 2024-09-05

## 2024-09-05 RX ORDER — OXYCODONE AND ACETAMINOPHEN 5; 325 MG/1; MG/1
2 TABLET ORAL ONCE
Status: DISCONTINUED | OUTPATIENT
Start: 2024-09-05 | End: 2024-09-05

## 2024-09-05 RX ORDER — OXYCODONE AND ACETAMINOPHEN 5; 325 MG/1; MG/1
1 TABLET ORAL EVERY 6 HOURS PRN
Qty: 10 TABLET | Refills: 0 | Status: SHIPPED | OUTPATIENT
Start: 2024-09-05 | End: 2024-09-08

## 2024-09-05 RX ORDER — KETOROLAC TROMETHAMINE 30 MG/ML
30 INJECTION, SOLUTION INTRAMUSCULAR; INTRAVENOUS ONCE
Status: COMPLETED | OUTPATIENT
Start: 2024-09-05 | End: 2024-09-05

## 2024-09-05 RX ORDER — OXYCODONE AND ACETAMINOPHEN 5; 325 MG/1; MG/1
2 TABLET ORAL ONCE
Status: COMPLETED | OUTPATIENT
Start: 2024-09-05 | End: 2024-09-05

## 2024-09-05 RX ADMIN — OXYCODONE HYDROCHLORIDE AND ACETAMINOPHEN 2 TABLET: 5; 325 TABLET ORAL at 21:44

## 2024-09-05 RX ADMIN — KETOROLAC TROMETHAMINE 30 MG: 30 INJECTION, SOLUTION INTRAMUSCULAR at 19:35

## 2024-09-05 ASSESSMENT — PAIN DESCRIPTION - DESCRIPTORS: DESCRIPTORS: ACHING

## 2024-09-05 ASSESSMENT — PAIN DESCRIPTION - ORIENTATION
ORIENTATION: LEFT
ORIENTATION: RIGHT;LEFT

## 2024-09-05 ASSESSMENT — PAIN DESCRIPTION - LOCATION
LOCATION: SHOULDER;KNEE
LOCATION: KNEE

## 2024-09-05 ASSESSMENT — PAIN - FUNCTIONAL ASSESSMENT: PAIN_FUNCTIONAL_ASSESSMENT: 0-10

## 2024-09-05 ASSESSMENT — PAIN SCALES - GENERAL
PAINLEVEL_OUTOF10: 7
PAINLEVEL_OUTOF10: 7

## 2024-09-05 NOTE — ED PROVIDER NOTES
pain  Differential: Fracture, strain, sprain, not dislocated, neurovascular intact, no signs of head injury, rotator cuff injury, ligamentous injury  VS: WNL    X-ray right shoulder my interpretation: No acute findings    X-ray left knee my interpretation: Lucency at the inferior patella, possible fracture    Patient reevaluated he has significant tenderness at the patella I suspect this is an acute fracture, will attempt to do in the brace, if not at least an ace wrap, he said he is able to get around his house okay and will follow-up with orthopedic clinic tomorrow morning, will give him a Percocet short course as he is still significantly tender and to help him and getting around.  He feels comfortable to go home wife is present who will help him, he will use his cane as he has been.       Patient/Guardian requesting discharge. Patient/Guardian was given written and verbal instructions prior to discharge. Patient/Guardian understood and agreed. Patient/Guardian had no further questions.       RADIOLOGY:  XR SHOULDER RIGHT (MIN 2 VIEWS)   Final Result   Osteopenia with mild osteoarthritic change of the acromioclavicular joint. No   evidence of acute fracture or subluxation.         XR KNEE LEFT (3 VIEWS)   Final Result   1. Mild diffuse osteopenia with no evidence of acute fracture or subluxation.   2. Moderate tricompartmental osteoarthritic changes most pronounced involving   the medial joint space compartment.   3. Lucency involving the inferior aspect of the patella which is most likely   a bipartite patella. However, I cannot exclude a fracture.               EKG  See MDM for my interpretation     All EKG's are interpreted by the Emergency Department Physician who either signs or Co-signs this chart in the absence of a cardiologist.      PROCEDURES:  None    CONSULTS:  None    Critical Care Time:  none    FINAL IMPRESSION      1. Closed nondisplaced fracture of left patella, unspecified fracture morphology,

## 2024-09-05 NOTE — ED TRIAGE NOTES
Pt has co fall from standing one hour ago.  Pt states he hit his head no loc.  Pt has co left knee, right shoulder pain.  Pt is aox4 pwd.

## 2024-09-05 NOTE — DISCHARGE INSTRUCTIONS
You were also given an ace wrap to use as needed for comfort.  Please do not apply this too tightly, as it can cut off blood supply.  After putting the ace wrap on, please check to make sure your limb is warm to the touch, normal in color, and that you have not had any loss of feeling.  If any of these occur, please remove immediately.     Do not drive or operate machinery while taking narcotics as it make you drowsy    Call orthopedic doctor to follow-up    Return for worsening headache visual changes neck or back pain numbness or weakness confusion nausea vomiting chest pain shortness of breath abdominal pain or other worsening symptoms or concerns

## 2024-09-06 ENCOUNTER — TELEPHONE (OUTPATIENT)
Dept: ORTHOPEDIC SURGERY | Facility: CLINIC | Age: 59
End: 2024-09-06

## 2024-09-06 ENCOUNTER — OFFICE VISIT (OUTPATIENT)
Dept: ORTHOPEDIC SURGERY | Facility: CLINIC | Age: 59
End: 2024-09-06
Payer: COMMERCIAL

## 2024-09-06 DIAGNOSIS — S82.002A CLOSED NONDISPLACED FRACTURE OF LEFT PATELLA, UNSPECIFIED FRACTURE MORPHOLOGY, INITIAL ENCOUNTER: Primary | ICD-10-CM

## 2024-09-06 DIAGNOSIS — M75.101 TEAR OF RIGHT ROTATOR CUFF, UNSPECIFIED TEAR EXTENT, UNSPECIFIED WHETHER TRAUMATIC: ICD-10-CM

## 2024-09-06 PROCEDURE — 99213 OFFICE O/P EST LOW 20 MIN: CPT | Mod: 57 | Performed by: INTERNAL MEDICINE

## 2024-09-06 PROCEDURE — 27520 TREAT KNEECAP FRACTURE: CPT | Performed by: INTERNAL MEDICINE

## 2024-09-06 NOTE — LETTER
September 6, 2024     Patient: Bill Lim   YOB: 1965   Date of Visit: 9/6/2024       To Whom It May Concern:    It is my medical opinion that Bill Lim may return to work on 09/09/24 with sit down work only for the following 2 weeks .    If you have any questions or concerns, please don't hesitate to call.                 Dante Turner MD

## 2024-09-06 NOTE — PROGRESS NOTES
Acute Injury New Patient Visit    CC: No chief complaint on file.      HPI: Bill is a 59 y.o. male presents today for evaluation for acute left knee and right shoulder injury sustained after a fall yesterday. He states that he fell onto the floor with his left knee and also injured his right shoulder. He was evaluated in the ER and had x-rays taken. He is here for initial evaluation.        Review of Systems   GENERAL: Negative for malaise, significant weight loss, fever  MUSCULOSKELETAL: See HPI  NEURO:  Negative for numbness / tingling     Past Medical History  Past Medical History:   Diagnosis Date    Anesthesia of skin 03/25/2022    Numbness and tingling    Morbid (severe) obesity due to excess calories (Multi) 12/13/2021    Morbid obesity with BMI of 60.0-69.9, adult    Non-pressure chronic ulcer of other part of right lower leg with fat layer exposed (Multi) 05/24/2018    Other specified personal risk factors, not elsewhere classified 03/25/2022    History of complications due to general anesthesia    Personal history of other diseases of the circulatory system 03/25/2022    History of hypertension    Personal history of other diseases of the musculoskeletal system and connective tissue 03/25/2022    History of arthritis    Personal history of other mental and behavioral disorders     History of depression    Personal history of other specified conditions 03/25/2022    History of balance disorder    Unspecified abnormalities of breathing 03/25/2022    Breathing problem    Unspecified disorder of ear, unspecified ear 03/25/2022    Ear, nose and throat disorder    Unspecified visual loss 03/25/2022    Vision problems       Medication review  Medication Documentation Review Audit       Reviewed by Zachariah Calderon MA (Medical Assistant) on 05/08/24 at 1556      Medication Order Taking? Sig Documenting Provider Last Dose Status   albuterol 2.5 mg /3 mL (0.083 %) nebulizer solution [id number] Yes Take 3 mL (2.5 mg)  by nebulization every 4 hours if needed for wheezing. Andrzej Perry MD Taking Active   albuterol 90 mcg/actuation inhaler [id number] Yes Inhale 2 puffs every 6 hours if needed for wheezing. Andrzej Perry MD Taking Active   blood sugar diagnostic (Blood Glucose Test) strip [id number] Yes Use to test blood sugar once daily in the morning Andrzej Perry MD Taking Active   blood-glucose meter misc [id number] Yes Use to test blood sugars daily Andrzej Perry MD Taking Active   cyclobenzaprine (Flexeril) 10 mg tablet [id number] Yes Take 1 tablet (10 mg) by mouth 3 times a day as needed for muscle spasms. Andrzej Perry MD Taking Active   desoximetasone (Topicort) 0.25 % cream [id number] Yes Apply topically 2 times a day. Andrzej Perry MD Taking Active   empagliflozin (Jardiance) 25 mg [id number] No Take 1 tablet (25 mg) by mouth once daily.   Patient not taking: Reported on 5/8/2024    Andrzej Perry MD Not Taking Active   fluticasone (Flonase) 50 mcg/actuation nasal spray [id number] Yes Administer 2 sprays into each nostril once daily. Andrzej Perry MD Taking Active   ibuprofen 800 mg tablet [id number] Yes Take 1 tablet (800 mg) by mouth 3 times a day as needed for mild pain (1 - 3) (pain). Andrzej Perry MD Taking Active   lancets misc [id number] Yes Use to test blood sugars once daily in the morning Andrzej Perry MD Taking Active   semaglutide 0.25 mg or 0.5 mg (2 mg/3 mL) pen injector [id number] No Inject 0.25 mg under the skin 1 (one) time per week.   Patient not taking: Reported on 5/8/2024    Andrzej Perry MD Not Taking Active                    Allergies  No Known Allergies    Social History  Social History     Socioeconomic History    Marital status:      Spouse name: Not on file    Number of children: Not on file    Years of education: Not on file    Highest education level: Not on file   Occupational History    Not on file   Tobacco Use    Smoking status: Former     Types: Cigarettes      Passive exposure: Past    Smokeless tobacco: Never   Substance and Sexual Activity    Alcohol use: Yes     Comment: occassional    Drug use: Never    Sexual activity: Not on file   Other Topics Concern    Not on file   Social History Narrative    Not on file     Social Determinants of Health     Financial Resource Strain: Not on file   Food Insecurity: Not on file   Transportation Needs: Not on file   Physical Activity: Not on file   Stress: Not on file   Social Connections: Not on file   Intimate Partner Violence: Not on file   Housing Stability: Not on file       Surgical History  Past Surgical History:   Procedure Laterality Date    OTHER SURGICAL HISTORY  04/22/2019    Tonsillectomy    OTHER SURGICAL HISTORY  04/22/2019    Leg surgery    OTHER SURGICAL HISTORY  04/22/2021    Colonoscopy    OTHER SURGICAL HISTORY  04/22/2021    Esophagogastroduodenoscopy       Physical Exam:  GENERAL:  Patient is awake, alert, and oriented to person place and time.  Patient appears well nourished and well kept.  Affect Calm, Not Acutely Distressed.  HEENT:  Normocephalic, Atraumatic, EOMI  CARDIOVASCULAR:  Hemodynamically stable.  RESPIRATORY:  Normal respirations with unlabored breathing.  Extremity: Right shoulder shows skin is intact.  There is no erythema or warmth.  There is no clinical signs of infection.  He can actively forward flex to only 40 degrees, passively to 110 degrees.  Abduction at 70 degrees.  External rotation from neutral at 55 degrees.  Internal rotation at the level of the right hip.  Unable to perform a satisfactory Mahmood and Neer's test due to pain and guarding and loss of range of motion.  Positive empty can test with significant weakness with supraspinatus testing.  Positive drop arm test.  Positive crossarm test.  There is pain over the AC joint.  Unable to form a satisfactory Geary's test due to pain and guarding.  Still pulses are palpable.  Negative sulcus sign.  Unable to perform a  satisfactory anterior apprehension's test due to limited range of motion and pain and guarding.  Neurovascularly intact.    Left knee examination shows skin is intact.  There is no erythema or warmth.  Trace to 1+ effusion.  Can flex the right knee to 90 degrees.  Full extension at 0 degrees.  Pain mainly over the inferior pole of patella.  Patellar and quadricep mechanism intact, he can do a satisfactory straight leg test.  No pain over the medial joint line.  No pain over the lateral joint line.    Negative Alireza's test medially with no instability.  Negative Alireza's test laterally with no instability.  Negative Lachman's test.  Patellar and quadricep mechanism intact.  Negative anterior and posterior drawer test.  Negative patellar apprehension test.  Distal pulses are palpable, neurovascularly intact.  Walking with no significant antalgic gait.      Diagnostics: X-rays reviewed      Procedure: None    Assessment:   Acute nondisplaced fracture of the inferior pole of the patella of the left knee  Right rotator cuff tear    Plan: Bill presents today for initial evaluation for acute right shoulder and left knee injury sustained after a fall yesterday. We recommended non surgical treatment for his inferior pole patella fracture by placing him into a work however he hinged knee brace locked at full extension or knee immobilizer, and may weight-bear as tolerated.  In regards to his right shoulder rotator cuff tear, recommended a sling for comfort and a CT arthrogram of the right shoulder to evaluate for full-thickness rotator cuff tear, as he is unable to tolerate a MRI due to claustrophobia and chronic back issues, he will follow-up in 10-14 days, we will repeat x-rays of the left knee 2 views AP and lateral view, if he is clinic doing well may consider recovering his knee brace locked from 0 to 45 degrees. He will return to work on Monday with sit down job only.  He will call for any work  modifications.    No orders of the defined types were placed in this encounter.     At the conclusion of the visit there were no further questions by the patient/family regarding their plan of care.  Patient was instructed to call or return with any issues, questions, or concerns regarding their injury and/or treatment plan described above.     09/06/24 at 9:25 AM - Dante Turner MD  Scribe Attestation  By signing my name below, I, Torin Janneth, Scribe   attest that this documentation has been prepared under the direction and in the presence of Dante Turner MD.    Office: [phone]    This note was prepared using voice recognition software.  The details of this note are correct and have been reviewed, and corrected to the best of my ability.  Some grammatical errors may persist related to the Dragon software.

## 2024-09-06 NOTE — ED NOTES
D/C instructions given to patient no questions ask.Patient verbalized understanding and patient assisted from ed via wheel chair without any complications.

## 2024-09-06 NOTE — TELEPHONE ENCOUNTER
Patient lvm asking for letter and after visit summary, lvm with patient that we could print the note and summary and he could  at [facility] or we can mail it.

## 2024-09-20 ENCOUNTER — OFFICE VISIT (OUTPATIENT)
Dept: ORTHOPEDIC SURGERY | Facility: CLINIC | Age: 59
End: 2024-09-20
Payer: COMMERCIAL

## 2024-09-20 ENCOUNTER — HOSPITAL ENCOUNTER (OUTPATIENT)
Dept: RADIOLOGY | Facility: CLINIC | Age: 59
Discharge: HOME | End: 2024-09-20
Payer: COMMERCIAL

## 2024-09-20 DIAGNOSIS — S82.002A CLOSED NONDISPLACED FRACTURE OF LEFT PATELLA, UNSPECIFIED FRACTURE MORPHOLOGY, INITIAL ENCOUNTER: ICD-10-CM

## 2024-09-20 DIAGNOSIS — M75.82 ROTATOR CUFF TENDINITIS, LEFT: ICD-10-CM

## 2024-09-20 DIAGNOSIS — M75.81 ROTATOR CUFF TENDINITIS, RIGHT: ICD-10-CM

## 2024-09-20 DIAGNOSIS — M75.111 PARTIAL NONTRAUMATIC TEAR OF ROTATOR CUFF, RIGHT: Primary | ICD-10-CM

## 2024-09-20 DIAGNOSIS — M75.112 PARTIAL NONTRAUMATIC TEAR OF ROTATOR CUFF, LEFT: ICD-10-CM

## 2024-09-20 PROCEDURE — 99211 OFF/OP EST MAY X REQ PHY/QHP: CPT | Performed by: INTERNAL MEDICINE

## 2024-09-20 PROCEDURE — 73560 X-RAY EXAM OF KNEE 1 OR 2: CPT | Mod: LT

## 2024-09-20 NOTE — PROGRESS NOTES
CC:   No chief complaint on file.      HPI: Bill is a 59 y.o. male presents today for reevaluation for left  nondisplaced fracture of the inferior pole of the patella and right rotator cuff tear. He states that the knee is doing well. He notes that he has improved range of motion in his right shoulder.           Review of Systems   GENERAL: Negative for malaise, significant weight loss, fever  MUSCULOSKELETAL: See HPI  NEURO:  Negative for numbness / tingling     Past Medical History  Past Medical History:   Diagnosis Date    Anesthesia of skin 03/25/2022    Numbness and tingling    Morbid (severe) obesity due to excess calories (Multi) 12/13/2021    Morbid obesity with BMI of 60.0-69.9, adult    Non-pressure chronic ulcer of other part of right lower leg with fat layer exposed (Multi) 05/24/2018    Other specified personal risk factors, not elsewhere classified 03/25/2022    History of complications due to general anesthesia    Personal history of other diseases of the circulatory system 03/25/2022    History of hypertension    Personal history of other diseases of the musculoskeletal system and connective tissue 03/25/2022    History of arthritis    Personal history of other mental and behavioral disorders     History of depression    Personal history of other specified conditions 03/25/2022    History of balance disorder    Unspecified abnormalities of breathing 03/25/2022    Breathing problem    Unspecified disorder of ear, unspecified ear 03/25/2022    Ear, nose and throat disorder    Unspecified visual loss 03/25/2022    Vision problems       Medication review  Medication Documentation Review Audit       Reviewed by Zachariah Calderon MA (Medical Assistant) on 05/08/24 at 1556      Medication Order Taking? Sig Documenting Provider Last Dose Status   albuterol 2.5 mg /3 mL (0.083 %) nebulizer solution [id number] Yes Take 3 mL (2.5 mg) by nebulization every 4 hours if needed for wheezing. Andrzej Perry MD  Taking Active   albuterol 90 mcg/actuation inhaler [id number] Yes Inhale 2 puffs every 6 hours if needed for wheezing. Andrzej Perry MD Taking Active   blood sugar diagnostic (Blood Glucose Test) strip [id number] Yes Use to test blood sugar once daily in the morning Andrzej Perry MD Taking Active   blood-glucose meter misc [id number] Yes Use to test blood sugars daily Andrzej Perry MD Taking Active   cyclobenzaprine (Flexeril) 10 mg tablet [id number] Yes Take 1 tablet (10 mg) by mouth 3 times a day as needed for muscle spasms. Andrzej Perry MD Taking Active   desoximetasone (Topicort) 0.25 % cream [id number] Yes Apply topically 2 times a day. Andrzej Perry MD Taking Active   empagliflozin (Jardiance) 25 mg [id number] No Take 1 tablet (25 mg) by mouth once daily.   Patient not taking: Reported on 5/8/2024    Andrzej Perry MD Not Taking Active   fluticasone (Flonase) 50 mcg/actuation nasal spray [id number] Yes Administer 2 sprays into each nostril once daily. Andrzej Perry MD Taking Active   ibuprofen 800 mg tablet [id number] Yes Take 1 tablet (800 mg) by mouth 3 times a day as needed for mild pain (1 - 3) (pain). Andrzej Perry MD Taking Active   lancets misc [id number] Yes Use to test blood sugars once daily in the morning Andrzej Perry MD Taking Active   semaglutide 0.25 mg or 0.5 mg (2 mg/3 mL) pen injector [id number] No Inject 0.25 mg under the skin 1 (one) time per week.   Patient not taking: Reported on 5/8/2024    Andrzej Perry MD Not Taking Active                    Allergies  No Known Allergies    Social History  Social History     Socioeconomic History    Marital status:      Spouse name: Not on file    Number of children: Not on file    Years of education: Not on file    Highest education level: Not on file   Occupational History    Not on file   Tobacco Use    Smoking status: Former     Types: Cigarettes     Passive exposure: Past    Smokeless tobacco: Never   Substance and  Sexual Activity    Alcohol use: Yes     Comment: occassional    Drug use: Never    Sexual activity: Not on file   Other Topics Concern    Not on file   Social History Narrative    Not on file     Social Determinants of Health     Financial Resource Strain: Not on file   Food Insecurity: Not on file   Transportation Needs: Not on file   Physical Activity: Not on file   Stress: Not on file   Social Connections: Not on file   Intimate Partner Violence: Not on file   Housing Stability: Not on file       Surgical History  Past Surgical History:   Procedure Laterality Date    OTHER SURGICAL HISTORY  04/22/2019    Tonsillectomy    OTHER SURGICAL HISTORY  04/22/2019    Leg surgery    OTHER SURGICAL HISTORY  04/22/2021    Colonoscopy    OTHER SURGICAL HISTORY  04/22/2021    Esophagogastroduodenoscopy       Physical Exam:  GENERAL:  Patient is awake, alert, and oriented to person place and time.  Patient appears well nourished and well kept.  Affect Calm, Not Acutely Distressed.  HEENT:  Normocephalic, Atraumatic, EOMI  CARDIOVASCULAR:  Hemodynamically stable.  RESPIRATORY:  Normal respirations with unlabored breathing.  Extremity: Right shoulder shows skin is intact.  There is no erythema or warmth.  There is no clinical signs of infection.  He can actively forward flex to only 160 degrees.  Abduction at 150 degrees.  External rotation from neutral at 65 degrees.  Internal rotation at the level of the L4.  There are signs of impingement with Mahmood and Neer's test .  Negative empty can test.  Negative drop arm test.  Negative crossarm test.  There is no pain over the AC joint.  Negative [place]'s test.  Distal pulses are palpable.  Negative sulcus sign.  Significant improvement compared to previous examination.  Neurovascularly intact.     Left knee examination shows skin is intact.  There is no erythema or warmth.  Trace amount of effusion.  Can flex the right knee to 120 degrees.  Full extension at 0 degrees.  Normal pain  mainly over the inferior pole of patella.  Patellar and quadricep mechanism intact, he can do a satisfactory straight leg test.  No pain over the medial joint line.  No pain over the lateral joint line.    Negative Alireza's test medially with no instability.  Negative Alireza's test laterally with no instability.  Negative Lachman's test.  Patellar and quadricep mechanism intact.  Negative anterior and posterior drawer test.  Negative patellar apprehension test.  Distal pulses are palpable, neurovascularly intact.  Walking with no significant antalgic gait.      Diagnostics: None today  CT lumbar spine wo IV contrast  Narrative: Interpreted By:  Jeremy Gimenez,   STUDY:  CT LUMBAR SPINE WO IV CONTRAST  6/19/2024 1:18 pm      INDICATION:  Signs/Symptoms:osteoarthritis of lumbar cervical spine, weakness of  right leg      COMPARISON:  02/27/2023      ACCESSION NUMBER(S):  [id number]      ORDERING CLINICIAN:  DARRIAN NGUYEN      TECHNIQUE:  Thin cut axial CT images through the lumbar spine were obtained and  reconstructed in the coronal and sagittal planes.      FINDINGS:  There is diffuse osteopenia.      There is again evidence of approximately 14 mm of anterolisthesis of  L5 on S1 along with bilateral L5 pars interarticularis defects  compatible with spondylolysis.      There is again evidence of minimal retrolisthesis of L4 on L5.      The coronal  images demonstrate a mild levocurvature of the  lumbar spine.      There is mild collapse of endplates within the lumbar and visualized  lower thoracic region.      At the L5/S1 level, there is similar 14 mm of anterolisthesis of L5  on S1 along with bilateral L5 pars interarticularis defects  compatible with spondylolysis. There is again evidence of severe  bilateral neural foraminal narrowing. There is no significant  narrowing of the thecal sac within the spinal canal.      At the L4/5 level, there is 2 mm of retrolisthesis of L4 on L5 along  with  hypertrophic degenerative facet changes. There is mild narrowing  of the thecal sac in the transverse dimension within the spinal  canal. There is mild-to-moderate encroachment upon the neural foramen  bilaterally.      At the L3/4 level, there are mild degenerative facet changes and  ligamentum flavum hypertrophy without significant spinal canal or  neural foraminal narrowing.      At the L2/3 level, there is a minimal posterior disc bulge along with  degenerative facet changes and ligamentum flavum hypertrophy  contributing to mild spinal canal narrowing. There is no significant  neural foraminal narrowing.          At the L1/2 level, there is no significant bony encroachment upon the  spinal canal or neural foramen narrowing      At the T12/L1 level, there is a small partially calcified right-sided  disc without significant spinal canal or neural foraminal narrowing.      At the T11/12 level, there is no significant bony encroachment upon  the spinal canal or neural foramen.      Atherosclerotic calcifications are again noted along the aorta and  left iliac artery.      Impression: There is diffuse osteopenia.      There is again evidence of approximately 14 mm of anterolisthesis of  L5 on S1 along with bilateral L5 pars interarticularis defects  compatible with spondylolysis.      There is again evidence of minimal retrolisthesis of L4 on L5.      The coronal  images demonstrate a mild levocurvature of the  lumbar spine.      There is mild collapse of endplates within the lumbar and visualized  lower thoracic region.      There is multilevel spondylosis as described above.      MACRO:  None      Signed by: Jeremy Gimenez 6/19/2024 1:37 PM  Dictation workstation:   [id number]        Procedure: None    Assessment:   Acute nondisplaced fracture of the inferior pole of the patella of the left knee  Partial rotor cuff tear of the right shoulder  Right rotator cuff tendinitis    Plan: Bill presents today for  reevaluation for nondisplaced fracture of the inferior pole of the patella of the left knee and right partial rotator cuff tear. He is Niccoli doing well, we will hold off on the CT arthrogram as he is clinically showing significant proved in today, and unlikely has a massive rotator cuff tear. We recommended physical therapy for the right shoulder and a recovery knee brace locked from 0 to 90 degrees for support. He will follow-up in 3 weeks for reevaluation, and repeat x-rays of the left knee 2 views AP and lateral view.  If he is clinically doing well, we may unlock the brace to full range of motion, may begin physical therapy for the knee.  If the shoulder is still symptomatic, may consider possible subacromial corticosteroid injection.    No orders of the defined types were placed in this encounter.     At the conclusion of the visit there were no further questions by the patient/family regarding their plan of care.  Patient was instructed to call or return with any issues, questions, or concerns regarding their injury and/or treatment plan described above.     09/20/24 at 3:01 PM - Dante Turner MD  Scribe Attestation  By signing my name below, I, Torin Janneth, Scribe   attest that this documentation has been prepared under the direction and in the presence of Dante Turner MD.    Office: [phone]    This note was prepared using voice recognition software.  The details of this note are correct and have been reviewed, and corrected to the best of my ability.  Some grammatical errors may persist related to the Dragon software.

## 2024-09-28 DIAGNOSIS — J40 BRONCHITIS: Primary | ICD-10-CM

## 2024-09-28 RX ORDER — AZITHROMYCIN 250 MG/1
TABLET, FILM COATED ORAL
Qty: 6 TABLET | Refills: 0 | Status: SHIPPED | OUTPATIENT
Start: 2024-09-28 | End: 2024-10-03

## 2024-10-08 ENCOUNTER — HOSPITAL ENCOUNTER (OUTPATIENT)
Dept: RADIOLOGY | Facility: CLINIC | Age: 59
Discharge: HOME | End: 2024-10-08
Payer: COMMERCIAL

## 2024-10-08 ENCOUNTER — OFFICE VISIT (OUTPATIENT)
Dept: PRIMARY CARE | Facility: CLINIC | Age: 59
End: 2024-10-08
Payer: COMMERCIAL

## 2024-10-08 VITALS
SYSTOLIC BLOOD PRESSURE: 130 MMHG | RESPIRATION RATE: 16 BRPM | DIASTOLIC BLOOD PRESSURE: 80 MMHG | HEIGHT: 70 IN | WEIGHT: 315 LBS | TEMPERATURE: 97.3 F | BODY MASS INDEX: 45.1 KG/M2 | HEART RATE: 66 BPM | OXYGEN SATURATION: 95 %

## 2024-10-08 DIAGNOSIS — R05.3 PERSISTENT COUGH FOR 3 WEEKS OR LONGER: ICD-10-CM

## 2024-10-08 DIAGNOSIS — E66.01 CLASS 3 SEVERE OBESITY WITH SERIOUS COMORBIDITY AND BODY MASS INDEX (BMI) OF 45.0 TO 49.9 IN ADULT, UNSPECIFIED OBESITY TYPE: ICD-10-CM

## 2024-10-08 DIAGNOSIS — E66.813 CLASS 3 SEVERE OBESITY WITH SERIOUS COMORBIDITY AND BODY MASS INDEX (BMI) OF 45.0 TO 49.9 IN ADULT, UNSPECIFIED OBESITY TYPE: ICD-10-CM

## 2024-10-08 DIAGNOSIS — J00 NASOPHARYNGITIS: ICD-10-CM

## 2024-10-08 DIAGNOSIS — R05.9 COUGH IN ADULT PATIENT: ICD-10-CM

## 2024-10-08 DIAGNOSIS — R09.81 NASAL CONGESTION WITH RHINORRHEA: ICD-10-CM

## 2024-10-08 DIAGNOSIS — J34.89 NASAL CONGESTION WITH RHINORRHEA: ICD-10-CM

## 2024-10-08 DIAGNOSIS — J00 NASOPHARYNGITIS: Primary | ICD-10-CM

## 2024-10-08 PROCEDURE — 99213 OFFICE O/P EST LOW 20 MIN: CPT | Performed by: NURSE PRACTITIONER

## 2024-10-08 PROCEDURE — 71046 X-RAY EXAM CHEST 2 VIEWS: CPT | Performed by: RADIOLOGY

## 2024-10-08 PROCEDURE — 71046 X-RAY EXAM CHEST 2 VIEWS: CPT

## 2024-10-08 RX ORDER — NAPROXEN 500 MG/1
1 TABLET ORAL
COMMUNITY
Start: 2024-09-05

## 2024-10-08 RX ORDER — CEFDINIR 300 MG/1
300 CAPSULE ORAL 2 TIMES DAILY
Qty: 20 CAPSULE | Refills: 0 | Status: SHIPPED | OUTPATIENT
Start: 2024-10-08 | End: 2024-10-18

## 2024-10-08 RX ORDER — BROMPHENIRAMINE MALEATE, PSEUDOEPHEDRINE HYDROCHLORIDE, AND DEXTROMETHORPHAN HYDROBROMIDE 2; 30; 10 MG/5ML; MG/5ML; MG/5ML
5 SYRUP ORAL 4 TIMES DAILY PRN
Qty: 120 ML | Refills: 1 | Status: SHIPPED | OUTPATIENT
Start: 2024-10-08 | End: 2024-10-18

## 2024-10-08 ASSESSMENT — ENCOUNTER SYMPTOMS
DEPRESSION: 0
LOSS OF SENSATION IN FEET: 0
OCCASIONAL FEELINGS OF UNSTEADINESS: 1

## 2024-10-08 ASSESSMENT — PATIENT HEALTH QUESTIONNAIRE - PHQ9
2. FEELING DOWN, DEPRESSED OR HOPELESS: NOT AT ALL
SUM OF ALL RESPONSES TO PHQ9 QUESTIONS 1 AND 2: 0
2. FEELING DOWN, DEPRESSED OR HOPELESS: NOT AT ALL
SUM OF ALL RESPONSES TO PHQ9 QUESTIONS 1 AND 2: 0
1. LITTLE INTEREST OR PLEASURE IN DOING THINGS: NOT AT ALL
1. LITTLE INTEREST OR PLEASURE IN DOING THINGS: NOT AT ALL

## 2024-10-08 NOTE — PROGRESS NOTES
"Subjective   Patient ID: Bill Lim is a 59 y.o. male who presents for uri      Symptoms: cough that keeps him from sleeping, congestion, stuffy nose, post nasal drip.  Length of symptoms: 4 weeks ago  OTC: dayquil, nyquil,   Related information:   HPI     Review of Systems    Objective   /80 Comment: auto  Pulse 66   Temp 36.3 °C (97.3 °F)   Resp 16   Ht 1.765 m (5' 9.5\")   Wt (!) 156 kg (343 lb)   SpO2 95%   BMI 49.93 kg/m²     Physical Exam    Assessment/Plan          "

## 2024-10-08 NOTE — PROGRESS NOTES
Subjective   Patient ID: Bill Lim is a 59 y.o. male who is with a chief complaint of symptoms of respiratory tract infection.     HPI  Patient is a 59 y.o. male who CONSULTED AT [facility] today. Patient is with complaints of nasal congestion, mild nasal discharge, post nasal drip, cough, and fatigue. He denies having any headache / sinus pain, sore throat, muscle ache, loss of sense of taste, loss of sense of smell, diarrhea, chills nor fever. Patient states that present condition started about 30 days ago. Patient denies history of recent travel, exposure to person/people who tested positive for COVID 19, nor exposure to person/people with flu like symptoms. he denies shortness of breath, chest pain, palpitations, nor edema. he stated that he  tried OTC medications which afforded only slight relief of symptoms. he denies nausea, vomiting, abdominal pain, nor any other symptoms.    Patient states he had not received any COVID vaccine yet.  Patient states he have not yet received flu shot for this season.    Review of Systems  General: no weight loss, generally healthy, (+) fatigue  Head:  no headaches / sinus pain, no vertigo, no injury  Eyes: no diplopia, no tearing, no pain,   Ears: no change in hearing, no tinnitus, no bleeding, no vertigo  Mouth:  no dental difficulties, no gingival bleeding, no sore throat, no loss of sense of taste, (+) post nasal drip,   Nose: (+) congestion, (+) mild discharge, no bleeding, no obstruction, no loss of sense of smell  Neck: no stiffness, no pain, no tenderness, no masses, no bruit  Pulmonary: no dyspnea, no wheezing, no hemoptysis, (+) cough  Cardiovascular: no chest pain, no palpitations, no syncope, no orthopnea  Gastrointestinal: no change in appetite, no dysphagia, no abdominal pains, no diarrhea, no emesis, no melena  Genito Urinary: no dysuria, no urinary urgency, no nocturia, no incontinence, no change in nature of urine  Musculoskeletal:  no muscle ache, no joint pain, no limitation of range of motion, no paresthesia, no numbness  Constitutional: no fever, no chills, no night sweats    Objective   Physical Exam  General: ambulatory with assistive device (cane), in no acute distress  Head: normocephalic, no lesions, no sinus tenderness  Eyes: pink palpebral conjunctiva, anicteric sclerae, PERRLA, EOM's full  Ears: clear external auditory canals, no ear discharge, no bleeding from the ears, tympanic membrane intact  Nose: (+) congested nasal mucosa, (+) yellow mucoid nasal discharge, no bleeding, no obstruction  Throat: (+) erythema, and (+) exudate on posterior pharyngeal wall, no lesion  Neck: supple, no masses, no bruits, no CLADP  Chest: symmetrical chest expansion, no lagging, no retractions, clear breath sounds, no rales, no wheezes    Assessment/Plan   Problem List Items Addressed This Visit    None  Visit Diagnoses         Codes    Nasopharyngitis    -  Primary J00    Relevant Medications    cefdinir (Omnicef) 300 mg capsule    brompheniramine-pseudoeph-DM 2-30-10 mg/5 mL syrup    Other Relevant Orders    XR chest 2 views    Cough in adult patient     R05.9    Relevant Medications    cefdinir (Omnicef) 300 mg capsule    brompheniramine-pseudoeph-DM 2-30-10 mg/5 mL syrup    Other Relevant Orders    XR chest 2 views    Nasal congestion with rhinorrhea     R09.81, J34.89    Relevant Medications    cefdinir (Omnicef) 300 mg capsule    brompheniramine-pseudoeph-DM 2-30-10 mg/5 mL syrup    Other Relevant Orders    XR chest 2 views    Persistent cough for 3 weeks or longer     R05.3    Relevant Medications    cefdinir (Omnicef) 300 mg capsule    brompheniramine-pseudoeph-DM 2-30-10 mg/5 mL syrup    Other Relevant Orders    XR chest 2 views    BMI 45.0-49.9, adult (Multi)     Z68.42    Class 3 severe obesity with serious comorbidity and body mass index (BMI) of 45.0 to 49.9 in adult, unspecified obesity type     E66.813, E66.01, Z68.42        patient  for chest xray  Patient assisted by  staff with regards to when and where to get the xray done.  will call patient with official reading from the radiologist  patient verbalized understanding    DISCHARGE SUMMARY:   Patient was seen and examined. Diagnosis, treatment, treatment options, and possible complications of today's illness discussed and explained to patient. Patient to take medication/s associated with this visit. Patient may also take OTC analgesic/antipyretic if needed for pain/fever. Advised to increase oral fluid intake. Advised steam inhalation if needed to relieve congestion. Advised warm saline gargle if needed to relieve throat discomfort. Advised Listerine antiseptic mouthwash gargle TID. Patient may use Cepacol oral spray as needed to relieve throat discomfort. Patient was advised to discard the old toothbrush and use a new toothbrush beginning on the third of antibiotics. Advised to come back if with worsening or persistent symptoms. Patient verbalized understanding of plan of care.    Patient to come back in 7 - 10 days if needed for worsening symptoms.           CHRIS Ackerman 10/08/24 12:23 PM

## 2024-10-10 ENCOUNTER — DOCUMENTATION (OUTPATIENT)
Dept: PRIMARY CARE | Facility: CLINIC | Age: 59
End: 2024-10-10
Payer: COMMERCIAL

## 2024-10-17 ENCOUNTER — APPOINTMENT (OUTPATIENT)
Dept: ORTHOPEDIC SURGERY | Facility: CLINIC | Age: 59
End: 2024-10-17
Payer: COMMERCIAL

## 2025-01-23 ENCOUNTER — OFFICE VISIT (OUTPATIENT)
Dept: PRIMARY CARE | Facility: CLINIC | Age: 60
End: 2025-01-23
Payer: COMMERCIAL

## 2025-01-23 VITALS
HEIGHT: 69 IN | BODY MASS INDEX: 46.65 KG/M2 | WEIGHT: 315 LBS | HEART RATE: 81 BPM | TEMPERATURE: 97.2 F | DIASTOLIC BLOOD PRESSURE: 64 MMHG | SYSTOLIC BLOOD PRESSURE: 116 MMHG | OXYGEN SATURATION: 95 % | RESPIRATION RATE: 16 BRPM

## 2025-01-23 DIAGNOSIS — J01.00 ACUTE NON-RECURRENT MAXILLARY SINUSITIS: Primary | ICD-10-CM

## 2025-01-23 DIAGNOSIS — J34.89 NASAL CONGESTION WITH RHINORRHEA: ICD-10-CM

## 2025-01-23 DIAGNOSIS — E66.813 CLASS 3 SEVERE OBESITY WITH SERIOUS COMORBIDITY AND BODY MASS INDEX (BMI) OF 45.0 TO 49.9 IN ADULT, UNSPECIFIED OBESITY TYPE: ICD-10-CM

## 2025-01-23 DIAGNOSIS — R09.81 NASAL CONGESTION WITH RHINORRHEA: ICD-10-CM

## 2025-01-23 DIAGNOSIS — R05.9 COUGH IN ADULT PATIENT: ICD-10-CM

## 2025-01-23 DIAGNOSIS — J00 NASOPHARYNGITIS: ICD-10-CM

## 2025-01-23 DIAGNOSIS — R05.8 PRODUCTIVE COUGH: ICD-10-CM

## 2025-01-23 DIAGNOSIS — E66.01 CLASS 3 SEVERE OBESITY WITH SERIOUS COMORBIDITY AND BODY MASS INDEX (BMI) OF 45.0 TO 49.9 IN ADULT, UNSPECIFIED OBESITY TYPE: ICD-10-CM

## 2025-01-23 PROCEDURE — 99213 OFFICE O/P EST LOW 20 MIN: CPT | Performed by: NURSE PRACTITIONER

## 2025-01-23 RX ORDER — AMOXICILLIN 875 MG/1
875 TABLET, FILM COATED ORAL 2 TIMES DAILY
Qty: 20 TABLET | Refills: 0 | Status: SHIPPED | OUTPATIENT
Start: 2025-01-23 | End: 2025-02-02

## 2025-01-23 ASSESSMENT — PATIENT HEALTH QUESTIONNAIRE - PHQ9
SUM OF ALL RESPONSES TO PHQ9 QUESTIONS 1 AND 2: 0
2. FEELING DOWN, DEPRESSED OR HOPELESS: NOT AT ALL
SUM OF ALL RESPONSES TO PHQ9 QUESTIONS 1 AND 2: 0
1. LITTLE INTEREST OR PLEASURE IN DOING THINGS: NOT AT ALL
2. FEELING DOWN, DEPRESSED OR HOPELESS: NOT AT ALL
1. LITTLE INTEREST OR PLEASURE IN DOING THINGS: NOT AT ALL

## 2025-01-23 ASSESSMENT — PAIN SCALES - GENERAL: PAINLEVEL_OUTOF10: 2

## 2025-01-23 NOTE — PROGRESS NOTES
"Subjective   Patient ID: Bill Lim is a 59 y.o. male who presents for URI.      Symptoms:chest congestion, cough, lack of sleep, sore throat, trouble swallowing, runny nose congestion, sputum is clear thick   Length of symptoms:  1 week ago  OTC: dayquil/nyquil mucinex, cough drops  with no help.  Related information:    HPI     Review of Systems    Objective   /64   Pulse 81   Temp 36.2 °C (97.2 °F)   Resp 16   Ht 1.753 m (5' 9\")   Wt 150 kg (330 lb 9.6 oz)   SpO2 95%   BMI 48.82 kg/m²     Physical Exam    Assessment/Plan          "

## 2025-01-23 NOTE — PATIENT INSTRUCTIONS
DISCHARGE SUMMARY:   Patient was seen and examined. Diagnosis, treatment, treatment options, and possible complications of today's illness discussed and explained to patient. Patient to take medication/s associated with this visit. Patient may take OTC decongestant of choice as needed. Patient may also take OTC analgesic/ antipyretic if needed for pain/fever. Advised to increase oral fluid intake. Advised steam inhalation if needed to relieve congestion. Advised warm saline gargle if needed to relieve throat discomfort. Advised Listerine antiseptic mouthwash gargle TID. Patient may use Cepacol oral spray as needed to relieve throat discomfort. Patient was advised to discard the old toothbrush and use a new toothbrush beginning on the third of antibiotics. Advised to come back if with worsening or persistent symptoms. Patient verbalized understanding of plan of care.    Patient to come back in 7 - 10 days if needed for worsening symptoms.

## 2025-01-23 NOTE — PROGRESS NOTES
Subjective   Patient ID: Bill Lim is a 59 y.o. male who is with a chief complaint of symptoms of respiratory tract infection.     HPI   Patient is a 59 y.o. male who CONSULTED AT [facility] today. Patient is with complaints of nasal congestion, nasal discharge, mild maxillary sinus pressure, sore throat, productive cough, chest congestion, intermittent shortness of breath, intermittent wheezing, post nasal drip, and fatigue. He denies having any headache, muscle ache, loss of sense of taste, loss of sense of smell, diarrhea, chills nor fever. Patient states that present condition started about 7 days ago after being exposed to some of his coworkers who are having similar symptoms. he denies chest pain, palpitations, nor edema. he stated that he  tried OTC medications which afforded only slight relief of symptoms. he denies nausea, vomiting, abdominal pain, nor any other symptoms.    Patient states he had not received any COVID vaccine yet.  Patient states he have not yet received flu shot for this season.    Review of Systems  General: no weight loss, generally healthy, (+) fatigue  Head:  no headache, (+) mild maxillary sinus pressure, no vertigo, no injury  Eyes: no diplopia, no tearing, no pain,   Ears: no change in hearing, no tinnitus, no bleeding, no vertigo  Mouth:  no dental difficulties, no gingival bleeding, (+) sore throat, no loss of sense of taste, (+) post nasal drip,   Nose: (+) congestion, (+) discharge, no bleeding, no obstruction, no loss of sense of smell  Neck: no stiffness, no pain, no tenderness, no masses, no bruit  Pulmonary: (+) intermittent dyspnea, (+) intermittent wheezing, no hemoptysis, (+) productive cough, (+) chest congestion,   Cardiovascular: no chest pain, no palpitations, no syncope, no orthopnea  Gastrointestinal: no change in appetite, no dysphagia, no abdominal pains, no diarrhea, no emesis, no melena  Genito Urinary: no dysuria, no urinary  "urgency, no nocturia, no incontinence, no change in nature of urine  Musculoskeletal: no muscle ache, no joint pain, no limitation of range of motion, no paresthesia, no numbness  Constitutional: no fever, no chills, no night sweats    Objective   /64   Pulse 81   Temp 36.2 °C (97.2 °F)   Resp 16   Ht 1.753 m (5' 9\")   Wt 150 kg (330 lb 9.6 oz)   SpO2 95%   BMI 48.82 kg/m²     Physical Exam  General: ambulatory with assistive device (cane), in no acute distress  Head: normocephalic, no lesions, (+) maxillary sinus tenderness  Eyes: pink palpebral conjunctiva, anicteric sclerae, PERRLA, EOM's full  Ears: clear external auditory canals, no ear discharge, no bleeding from the ears, tympanic membrane intact  Nose: (+) congested nasal mucosa, (+) yellow mucoid nasal discharge, no bleeding, no obstruction  Throat: (+) erythema, and (+) exudate on posterior pharyngeal wall, no lesion  Neck: supple, no masses, no bruits, no CLADP  Chest: symmetrical chest expansion, no lagging, no retractions, clear breath sounds, no rales, no wheezes    Assessment/Plan   Problem List Items Addressed This Visit    None  Visit Diagnoses         Codes    Acute non-recurrent maxillary sinusitis    -  Primary J01.00    Relevant Medications    amoxicillin (Amoxil) 875 mg tablet    Nasal congestion with rhinorrhea     R09.81, J34.89    Relevant Medications    amoxicillin (Amoxil) 875 mg tablet    Nasopharyngitis     J00    Relevant Medications    amoxicillin (Amoxil) 875 mg tablet    Productive cough     R05.8    Relevant Medications    amoxicillin (Amoxil) 875 mg tablet    Cough in adult patient     R05.9    Relevant Medications    amoxicillin (Amoxil) 875 mg tablet    BMI 45.0-49.9, adult (Multi)     Z68.42    Class 3 severe obesity with serious comorbidity and body mass index (BMI) of 45.0 to 49.9 in adult, unspecified obesity type     E66.813, E66.01, Z68.42        DISCHARGE SUMMARY:   Patient was seen and examined. Diagnosis, " treatment, treatment options, and possible complications of today's illness discussed and explained to patient. Patient to take medication/s associated with this visit. Patient may take OTC decongestant of choice as needed. Patient may also take OTC analgesic/ antipyretic if needed for pain/fever. Advised to increase oral fluid intake. Advised steam inhalation if needed to relieve congestion. Advised warm saline gargle if needed to relieve throat discomfort. Advised Listerine antiseptic mouthwash gargle TID. Patient may use Cepacol oral spray as needed to relieve throat discomfort. Patient was advised to discard the old toothbrush and use a new toothbrush beginning on the third of antibiotics. Advised to come back if with worsening or persistent symptoms. Patient verbalized understanding of plan of care.    Patient to come back in 7 - 10 days if needed for worsening symptoms.

## 2025-03-13 ENCOUNTER — TELEPHONE (OUTPATIENT)
Dept: PRIMARY CARE | Facility: CLINIC | Age: 60
End: 2025-03-13
Payer: COMMERCIAL

## 2025-03-13 NOTE — TELEPHONE ENCOUNTER
Patient of  dr. Vicky Madison submitted for ozempic    It is approved from 02/10/2025 to 03/12/2026

## 2025-08-04 ENCOUNTER — APPOINTMENT (OUTPATIENT)
Dept: PRIMARY CARE | Facility: CLINIC | Age: 60
End: 2025-08-04
Payer: COMMERCIAL

## 2025-08-04 ENCOUNTER — HOSPITAL ENCOUNTER (OUTPATIENT)
Dept: RADIOLOGY | Facility: CLINIC | Age: 60
Discharge: HOME | End: 2025-08-04
Payer: COMMERCIAL

## 2025-08-04 VITALS
TEMPERATURE: 97.7 F | DIASTOLIC BLOOD PRESSURE: 74 MMHG | OXYGEN SATURATION: 98 % | HEIGHT: 69 IN | RESPIRATION RATE: 16 BRPM | HEART RATE: 76 BPM | BODY MASS INDEX: 46.65 KG/M2 | WEIGHT: 315 LBS | SYSTOLIC BLOOD PRESSURE: 122 MMHG

## 2025-08-04 DIAGNOSIS — S32.020A COMPRESSION FRACTURE OF L2 VERTEBRA, INITIAL ENCOUNTER (MULTI): ICD-10-CM

## 2025-08-04 DIAGNOSIS — S43.401A SPRAIN OF RIGHT SHOULDER, UNSPECIFIED SHOULDER SPRAIN TYPE, INITIAL ENCOUNTER: ICD-10-CM

## 2025-08-04 DIAGNOSIS — E78.5 DYSLIPIDEMIA: ICD-10-CM

## 2025-08-04 DIAGNOSIS — E11.69 TYPE 2 DIABETES MELLITUS WITH OTHER SPECIFIED COMPLICATION, UNSPECIFIED WHETHER LONG TERM INSULIN USE (MULTI): ICD-10-CM

## 2025-08-04 DIAGNOSIS — R10.9 ABDOMINAL PAIN, UNSPECIFIED ABDOMINAL LOCATION: ICD-10-CM

## 2025-08-04 DIAGNOSIS — Z12.5 SCREENING FOR PROSTATE CANCER: ICD-10-CM

## 2025-08-04 DIAGNOSIS — Z87.891 FORMER CIGARETTE SMOKER: Primary | ICD-10-CM

## 2025-08-04 DIAGNOSIS — E55.9 VITAMIN D DEFICIENCY: ICD-10-CM

## 2025-08-04 DIAGNOSIS — R53.83 FATIGUE, UNSPECIFIED TYPE: ICD-10-CM

## 2025-08-04 LAB — POC HEMOGLOBIN A1C: 5.3 % (ref 4.2–6.5)

## 2025-08-04 PROCEDURE — 99214 OFFICE O/P EST MOD 30 MIN: CPT | Performed by: FAMILY MEDICINE

## 2025-08-04 PROCEDURE — 73030 X-RAY EXAM OF SHOULDER: CPT | Mod: RIGHT SIDE | Performed by: RADIOLOGY

## 2025-08-04 PROCEDURE — 83036 HEMOGLOBIN GLYCOSYLATED A1C: CPT | Performed by: FAMILY MEDICINE

## 2025-08-04 PROCEDURE — 73030 X-RAY EXAM OF SHOULDER: CPT | Mod: RT

## 2025-08-04 RX ORDER — DICLOFENAC SODIUM 75 MG/1
75 TABLET, DELAYED RELEASE ORAL 2 TIMES DAILY PRN
Qty: 60 TABLET | Refills: 2 | Status: SHIPPED | OUTPATIENT
Start: 2025-08-04 | End: 2025-11-02

## 2025-08-04 RX ORDER — CYCLOBENZAPRINE HCL 10 MG
10 TABLET ORAL 3 TIMES DAILY PRN
Qty: 30 TABLET | Refills: 0 | Status: SHIPPED | OUTPATIENT
Start: 2025-08-04 | End: 2025-10-03

## 2025-08-04 RX ORDER — FLUTICASONE FUROATE, UMECLIDINIUM BROMIDE AND VILANTEROL TRIFENATATE 200; 62.5; 25 UG/1; UG/1; UG/1
1 POWDER RESPIRATORY (INHALATION)
COMMUNITY
Start: 2025-07-23

## 2025-08-04 NOTE — PROGRESS NOTES
"Subjective   Patient ID: Bill Lim is a 59 y.o. male who presents for Follow-up (Pt presents here with \"rapied weight loss he states since 2020\"), Medication Problem (Pt would like to discuss ALL medications), urine (Pt would like to discuss frequent bathroom issue.), and Shoulder Injury (R shoulder. Pt states he fell 1 year ago).  History of Present Illness  The patient presents for evaluation of right shoulder pain, weight loss, and former cigarette smoker.    He has a history of heavy smoking, consuming 2.5 packs per day for 26 years, primarily due to stress. He is a former cigarette smoker.    He experiences intermittent diarrhea and has noticed a change in his sense of taste. He does not monitor his blood sugar levels regularly, having only done so for a week at one point. He was previously diagnosed as prediabetic but is not currently on any medication for this condition. He has not been taking vitamin D3 supplements. He has not been using injections for weight loss and has lost 120 pounds. He reports no abdominal pain. He has not been taking aspirin recently.    He has been using diclofenac for joint inflammation and occasionally experiences back tightness. He also uses Advil for arthritis pain.    He had a fall about a year ago, which he initially thought resulted in a sprained shoulder. He consulted an orthopedist at the time and was informed that his shoulder was fine. However, he has recently started experiencing discomfort in the same shoulder.    Social History:  Tobacco: He is a former cigarette smoker.  See Above  Review of Systems  12 Systems have been reviewed as follows.  Constitutional: Fever, weight gain, weight loss, appetite change, night sweats, fatigue, chills.  Eyes : blurry, double vision, vision, loss, tearing, redness, pain, sensitivity to light, glaucoma.  Ears, nose, mouth, and throat: Hearing loss, ringing in the ears, ear pain, nasal congestion, nasal drainage, nosebleeds, mouth, " "throat, irritation tooth problem.  Cardiovascular :chest pain, pressure, heart racing, palpitations, sweating, leg swelling, high or low blood pressure  Pulmonary: Cough, yellow or green sputum, blood and sputum, shortness of breath, wheezing  Gastrointestinal: Nausea, vomiting, diarrhea, constipation, pain, blood in stool, or vomitus, heartburn, difficulty swallowing  Genitourinary: incontinence, abnormal bleeding, abnormal discharge, urinary frequency, urinary hesitancy, pain, impotence sexual problem, infection, urinary retention  Musculoskeletal: Pain, stiffness, joint, redness or warmth, arthritis, back pain, weakness, muscle wasting, sprain or fracture  Neuro: Weight weakness, dizziness, change in voice, change in taste change in vision, change in hearing, loss, or change of sensation, trouble walking, balance problems coordination problems, shaking, speech problem  Endocrine , cold or heat intolerance, blood sugar problem, weight gain or loss missed periods hot flashes, sweats, change in body hair, change in libido, increased thirst, increased urination  Heme/lymph: Swelling, bleeding, problem anemia, bruising, enlarged lymph nodes  Allergic/immunologic: H. plus nasal drip, watery itchy eyes, nasal drainage, immunosuppressed  The above were reviewed and noted negative except as noted in HPI and Problem List.    Objective     /74 (BP Location: Right arm, Patient Position: Sitting, BP Cuff Size: Large adult)   Pulse 76   Temp 36.5 °C (97.7 °F) (Temporal)   Resp 16   Ht 1.753 m (5' 9\")   Wt 144 kg (317 lb 6.4 oz)   SpO2 98%   BMI 46.87 kg/m²      Physical Exam  Musculoskeletal: Tenderness in the right shoulder, diagnosed with tendinitis or bursitis.  Constitutional: Well developed, well nourished, alert and in no acute distress   Eyes: Normal external exam. Pupils equally round and reactive to light with normal accommodation and extraocular movements intact.  Neck: Supple, no lymphadenopathy or " masses.   Cardiovascular: Regular rate and rhythm, normal S1 and S2, no murmurs, gallops, or rubs. Radial pulses normal. No peripheral edema.  Pulmonary: No respiratory distress, lungs clear to auscultation bilaterally. No wheezes, rhonchi, rales.  Abdomen: severely tender, non distended, without masses or HSM  Skin: Warm, well perfused, normal skin turgor and color.   Neurologic: Cranial nerves II-XII grossly intact.   Psychiatric: Mood calm and affect normal  Musculoskeletal: Moving all extremities without restriction  The above were reviewed and noted negative except as noted in HPI and Problem List.      Results  Labs   - A1c: 5.3         Assessment & Plan  1. Right shoulder pain.  - Persistent pain in the right shoulder following a fall approximately a year ago.  - An x-ray of the right shoulder will be ordered to assess the extent of the injury.  - Depending on the results, an injection may be considered during the next visit.  - Diagnosis of tendinitis or bursitis of the right shoulder.    2. Weight loss.  - Significant weight loss of approximately 120 pounds without intentional dietary changes or weight loss medications.  - No reported abdominal pain.  - A CAT scan of the abdomen and pelvis will be ordered to investigate potential causes.  - Blood work, including CBC, CMP, lipid panel, vitamin D, and A1c, will be conducted today to rule out any underlying conditions.    3. Former cigarette smoker.  - History of smoking 2.5 packs per day for 26 years but has since quit.  - A CAT scan of the lungs will be ordered for lung cancer screening due to the significant smoking history.  - Insurance coverage confirmed for the procedure.    4. Diabetes mellitus.  - A1c is currently 5.3, indicating good control of diabetes.  - The patient is not currently taking any medications for diabetes.  - Blood work, including an A1c test, will be conducted today to monitor diabetes status.  - No current symptoms of hyperglycemia  reported.    5. Joint inflammation.  - Reports of joint inflammation and occasional back tightness.  - Diclofenac 75 mg twice a day will be prescribed with a couple of refills.  - Flexeril 30 tablets with no refills will be prescribed for muscle relaxation as needed.  - Advised not to take Advil or Naprosyn while on diclofenac.    6. Health maintenance.  - Advised to take a baby aspirin daily.  - Blood work, including CBC, CMP, lipid panel, vitamin D, and PSA, will be conducted today for routine health maintenance and prostate cancer screening.  - No current symptoms of vitamin D deficiency reported.    Follow-up: The patient will follow up in 2.5 weeks.    Problem List Items Addressed This Visit       Compression fracture of L2 lumbar vertebra (Multi)    Relevant Medications    diclofenac (Voltaren) 75 mg EC tablet    cyclobenzaprine (Flexeril) 10 mg tablet    Other Relevant Orders    Follow Up In [facility] - PCP - Established    Fatigue    Relevant Orders    CBC and Auto Differential    Thyroid Stimulating Hormone    Free T4 Index    Follow Up In [facility] - PCP - Established    Type 2 diabetes mellitus    Relevant Orders    POCT glycosylated hemoglobin (Hb A1C) manually resulted (Completed)    Follow Up In [facility] - PCP - Established    Vitamin D deficiency    Relevant Orders    Vitamin D 25-Hydroxy,Total (for eval of Vitamin D levels)    Follow Up In [facility] - PCP - Established     Other Visit Diagnoses         Former cigarette smoker    -  Primary    Relevant Orders    CT lung screening low dose    Follow Up In [facility] - PCP - Established      Abdominal pain, unspecified abdominal location        Relevant Orders    CT abdomen pelvis w IV contrast    Follow Up In [facility] - PCP - Established      Screening for prostate cancer        Relevant Orders    Prostate Specific Antigen, Screen    Follow Up [facility] - PCP -  Established      Dyslipidemia        Relevant Orders    Comprehensive Metabolic Panel    Lipid Panel    Follow Up In [facility] - PCP - Established      Sprain of right shoulder, unspecified shoulder sprain type, initial encounter        Relevant Medications    diclofenac (Voltaren) 75 mg EC tablet    Other Relevant Orders    XR shoulder right 2+ views    Follow Up In [facility] - PCP - Established         Continue    -Aspirin 81 every day      -consider Lipitor and valsartan next  e current medications and therapy for chronic medical conditions        Andrzej Perry MD       This medical note was created with the assistance of artificial intelligence (AI) for documentation purposes. The content has been reviewed and confirmed by the healthcare provider for accuracy and completeness. Patient consented to the use of audio recording and use of AI during their visit.

## 2025-08-05 LAB
25(OH)D3+25(OH)D2 SERPL-MCNC: 14 NG/ML (ref 30–100)
ALBUMIN SERPL-MCNC: 3.7 G/DL (ref 3.6–5.1)
ALP SERPL-CCNC: 243 U/L (ref 35–144)
ALT SERPL-CCNC: 26 U/L (ref 9–46)
ANION GAP SERPL CALCULATED.4IONS-SCNC: 5 MMOL/L (CALC) (ref 7–17)
AST SERPL-CCNC: 37 U/L (ref 10–35)
BASOPHILS # BLD AUTO: 32 CELLS/UL (ref 0–200)
BASOPHILS NFR BLD AUTO: 0.8 %
BILIRUB SERPL-MCNC: 0.8 MG/DL (ref 0.2–1.2)
BUN SERPL-MCNC: 6 MG/DL (ref 7–25)
CALCIUM SERPL-MCNC: 7.9 MG/DL (ref 8.6–10.3)
CHLORIDE SERPL-SCNC: 107 MMOL/L (ref 98–110)
CHOLEST SERPL-MCNC: 76 MG/DL
CHOLEST/HDLC SERPL: 4.2 (CALC)
CO2 SERPL-SCNC: 25 MMOL/L (ref 20–32)
CREAT SERPL-MCNC: 0.54 MG/DL (ref 0.7–1.3)
EGFRCR SERPLBLD CKD-EPI 2021: 116 ML/MIN/1.73M2
EOSINOPHIL # BLD AUTO: 652 CELLS/UL (ref 15–500)
EOSINOPHIL NFR BLD AUTO: 16.3 %
ERYTHROCYTE [DISTWIDTH] IN BLOOD BY AUTOMATED COUNT: 17.8 % (ref 11–15)
FT4I SERPL CALC-MCNC: 1.9 (ref 1.4–3.8)
GLUCOSE SERPL-MCNC: 102 MG/DL (ref 65–99)
HCT VFR BLD AUTO: 34.8 % (ref 38.5–50)
HDLC SERPL-MCNC: 18 MG/DL
HGB BLD-MCNC: 9.2 G/DL (ref 13.2–17.1)
LDLC SERPL CALC-MCNC: 44 MG/DL (CALC)
LYMPHOCYTES # BLD AUTO: 620 CELLS/UL (ref 850–3900)
LYMPHOCYTES NFR BLD AUTO: 15.5 %
MCH RBC QN AUTO: 18 PG (ref 27–33)
MCHC RBC AUTO-ENTMCNC: 26.4 G/DL (ref 32–36)
MCV RBC AUTO: 68.2 FL (ref 80–100)
MONOCYTES # BLD AUTO: 360 CELLS/UL (ref 200–950)
MONOCYTES NFR BLD AUTO: 9 %
MORPHOLOGY BLD-IMP: ABNORMAL
NEUTROPHILS # BLD AUTO: 2336 CELLS/UL (ref 1500–7800)
NEUTROPHILS NFR BLD AUTO: 58.4 %
NONHDLC SERPL-MCNC: 58 MG/DL (CALC)
PLATELET # BLD AUTO: 191 THOUSAND/UL (ref 140–400)
PMV BLD REES-ECKER: 8.6 FL (ref 7.5–12.5)
POTASSIUM SERPL-SCNC: 3.9 MMOL/L (ref 3.5–5.3)
PROT SERPL-MCNC: 5.9 G/DL (ref 6.1–8.1)
PSA SERPL-MCNC: 1.53 NG/ML
RBC # BLD AUTO: 5.1 MILLION/UL (ref 4.2–5.8)
SODIUM SERPL-SCNC: 137 MMOL/L (ref 135–146)
T3RU NFR SERPL: 28 % (ref 22–35)
T4 SERPL-MCNC: 6.8 MCG/DL (ref 4.9–10.5)
TRIGL SERPL-MCNC: 55 MG/DL
TSH SERPL-ACNC: 1.66 MIU/L (ref 0.4–4.5)
WBC # BLD AUTO: 4 THOUSAND/UL (ref 3.8–10.8)

## 2025-08-20 ENCOUNTER — HOSPITAL ENCOUNTER (OUTPATIENT)
Dept: RADIOLOGY | Facility: HOSPITAL | Age: 60
Discharge: HOME | End: 2025-08-20
Payer: COMMERCIAL

## 2025-08-20 DIAGNOSIS — Z87.891 FORMER CIGARETTE SMOKER: ICD-10-CM

## 2025-08-20 DIAGNOSIS — R10.9 ABDOMINAL PAIN, UNSPECIFIED ABDOMINAL LOCATION: ICD-10-CM

## 2025-08-20 PROCEDURE — 2550000001 HC RX 255 CONTRASTS: Performed by: FAMILY MEDICINE

## 2025-08-20 PROCEDURE — 74177 CT ABD & PELVIS W/CONTRAST: CPT

## 2025-08-20 PROCEDURE — 71271 CT THORAX LUNG CANCER SCR C-: CPT

## 2025-08-20 RX ADMIN — IOHEXOL 75 ML: 350 INJECTION, SOLUTION INTRAVENOUS at 09:02

## 2025-08-21 ENCOUNTER — APPOINTMENT (OUTPATIENT)
Dept: PRIMARY CARE | Facility: CLINIC | Age: 60
End: 2025-08-21
Payer: COMMERCIAL

## 2025-08-21 VITALS
BODY MASS INDEX: 47.08 KG/M2 | WEIGHT: 315 LBS | SYSTOLIC BLOOD PRESSURE: 122 MMHG | OXYGEN SATURATION: 98 % | DIASTOLIC BLOOD PRESSURE: 76 MMHG | HEART RATE: 86 BPM

## 2025-08-21 DIAGNOSIS — S43.401A SPRAIN OF RIGHT SHOULDER, UNSPECIFIED SHOULDER SPRAIN TYPE, INITIAL ENCOUNTER: ICD-10-CM

## 2025-08-21 DIAGNOSIS — E11.69 TYPE 2 DIABETES MELLITUS WITH OTHER SPECIFIED COMPLICATION, UNSPECIFIED WHETHER LONG TERM INSULIN USE (MULTI): ICD-10-CM

## 2025-08-21 DIAGNOSIS — Z12.11 ENCOUNTER FOR COLORECTAL CANCER SCREENING: Primary | ICD-10-CM

## 2025-08-21 DIAGNOSIS — R10.9 ABDOMINAL PAIN, UNSPECIFIED ABDOMINAL LOCATION: ICD-10-CM

## 2025-08-21 DIAGNOSIS — J40 BRONCHITIS: ICD-10-CM

## 2025-08-21 DIAGNOSIS — Z87.891 FORMER CIGARETTE SMOKER: ICD-10-CM

## 2025-08-21 DIAGNOSIS — R53.83 FATIGUE, UNSPECIFIED TYPE: ICD-10-CM

## 2025-08-21 DIAGNOSIS — S32.020A COMPRESSION FRACTURE OF L2 VERTEBRA, INITIAL ENCOUNTER (MULTI): ICD-10-CM

## 2025-08-21 DIAGNOSIS — E78.5 DYSLIPIDEMIA: ICD-10-CM

## 2025-08-21 DIAGNOSIS — Z12.5 SCREENING FOR PROSTATE CANCER: ICD-10-CM

## 2025-08-21 DIAGNOSIS — D64.9 ANEMIA, UNSPECIFIED TYPE: ICD-10-CM

## 2025-08-21 DIAGNOSIS — F43.9 STRESS: ICD-10-CM

## 2025-08-21 DIAGNOSIS — Z12.12 ENCOUNTER FOR COLORECTAL CANCER SCREENING: Primary | ICD-10-CM

## 2025-08-21 DIAGNOSIS — E53.8 B12 DEFICIENCY: ICD-10-CM

## 2025-08-21 DIAGNOSIS — E55.9 VITAMIN D DEFICIENCY: ICD-10-CM

## 2025-08-21 DIAGNOSIS — J45.41 MODERATE PERSISTENT ASTHMA WITH ACUTE EXACERBATION (HHS-HCC): ICD-10-CM

## 2025-08-21 PROCEDURE — 99214 OFFICE O/P EST MOD 30 MIN: CPT | Performed by: FAMILY MEDICINE

## 2025-08-21 RX ORDER — FLUOXETINE 20 MG/1
20 TABLET ORAL DAILY
Qty: 30 TABLET | Refills: 2 | Status: SHIPPED | OUTPATIENT
Start: 2025-08-21 | End: 2025-11-19

## 2025-08-21 RX ORDER — CEFDINIR 300 MG/1
300 CAPSULE ORAL 2 TIMES DAILY
Qty: 20 CAPSULE | Refills: 0 | Status: SHIPPED | OUTPATIENT
Start: 2025-08-21 | End: 2025-08-31

## 2025-08-21 RX ORDER — CHOLECALCIFEROL (VITAMIN D3) 50 MCG
50 TABLET ORAL DAILY
Qty: 90 TABLET | Refills: 1 | Status: SHIPPED | OUTPATIENT
Start: 2025-08-21 | End: 2026-08-21

## 2025-08-21 RX ORDER — FLUTICASONE FUROATE, UMECLIDINIUM BROMIDE AND VILANTEROL TRIFENATATE 200; 62.5; 25 UG/1; UG/1; UG/1
1 POWDER RESPIRATORY (INHALATION) DAILY
Qty: 2 EACH | Refills: 0 | COMMUNITY
Start: 2025-08-21

## 2025-08-21 ASSESSMENT — ENCOUNTER SYMPTOMS
OCCASIONAL FEELINGS OF UNSTEADINESS: 0
DEPRESSION: 1
LOSS OF SENSATION IN FEET: 0

## 2025-08-22 ENCOUNTER — TELEMEDICINE (OUTPATIENT)
Dept: PRIMARY CARE | Facility: CLINIC | Age: 60
End: 2025-08-22
Payer: COMMERCIAL

## 2025-08-26 PROBLEM — K70.31 ALCOHOLIC CIRRHOSIS OF LIVER WITH ASCITES (MULTI): Status: ACTIVE | Noted: 2025-08-26

## 2025-09-03 ENCOUNTER — TELEPHONE (OUTPATIENT)
Dept: GASTROENTEROLOGY | Facility: CLINIC | Age: 60
End: 2025-09-03
Payer: COMMERCIAL

## 2025-09-23 ENCOUNTER — APPOINTMENT (OUTPATIENT)
Dept: PRIMARY CARE | Facility: CLINIC | Age: 60
End: 2025-09-23
Payer: COMMERCIAL